# Patient Record
Sex: FEMALE | Race: WHITE | NOT HISPANIC OR LATINO | Employment: OTHER | RURAL
[De-identification: names, ages, dates, MRNs, and addresses within clinical notes are randomized per-mention and may not be internally consistent; named-entity substitution may affect disease eponyms.]

---

## 2020-03-11 ENCOUNTER — HISTORICAL (OUTPATIENT)
Dept: ADMINISTRATIVE | Facility: HOSPITAL | Age: 85
End: 2020-03-11

## 2020-03-11 LAB
INR BLD: 2 RATIO (ref 0–3.3)
PROTHROMBIN TIME: 24.2 SECONDS (ref 12–14.7)

## 2020-05-28 ENCOUNTER — HISTORICAL (OUTPATIENT)
Dept: ADMINISTRATIVE | Facility: HOSPITAL | Age: 85
End: 2020-05-28

## 2020-05-28 LAB
ALBUMIN SERPL BCP-MCNC: 3.4 G/DL (ref 3.5–5)
ALP SERPL-CCNC: 105 U/L (ref 55–142)
ALT SERPL W P-5'-P-CCNC: 14 U/L (ref 13–56)
AST SERPL W P-5'-P-CCNC: 13 U/L (ref 15–37)
BASOPHILS # BLD AUTO: 0.09 X10E3/UL (ref 0–0.2)
BASOPHILS NFR BLD AUTO: 1 % (ref 0–1)
BILIRUB DIRECT SERPL-MCNC: 0.2 MG/DL (ref 0–0.2)
BILIRUB SERPL-MCNC: 0.5 MG/DL (ref 0–1.2)
BUN SERPL-MCNC: 12 MG/DL (ref 7–18)
CALCIUM SERPL-MCNC: 8.7 MG/DL (ref 8.5–10.1)
CHLORIDE SERPL-SCNC: 108 MMOL/L (ref 98–107)
CHOLEST SERPL-MCNC: 146 MG/DL
CHOLEST/HDLC SERPL: 2.3 {RATIO}
CO2 SERPL-SCNC: 27 MMOL/L (ref 21–32)
CREAT SERPL-MCNC: 0.56 MG/DL (ref 0.5–1.02)
EOSINOPHIL # BLD AUTO: 0.25 X10E3/UL (ref 0–0.5)
EOSINOPHIL NFR BLD AUTO: 2.8 % (ref 1–4)
ERYTHROCYTE [DISTWIDTH] IN BLOOD BY AUTOMATED COUNT: 14.4 % (ref 11.5–14.5)
GLUCOSE SERPL-MCNC: 105 MG/DL (ref 74–106)
HCT VFR BLD AUTO: 40.5 % (ref 38–47)
HDLC SERPL-MCNC: 64 MG/DL
HGB BLD-MCNC: 12.4 G/DL (ref 12–16)
IMM GRANULOCYTES # BLD AUTO: 0.03 X10E3/UL (ref 0–0.04)
IMM GRANULOCYTES NFR BLD: 0.3 % (ref 0–0.4)
INR BLD: 2.6 RATIO (ref 0–3.3)
LDLC SERPL CALC-MCNC: 63 MG/DL
LYMPHOCYTES # BLD AUTO: 1.67 X10E3/UL (ref 1–4.8)
LYMPHOCYTES NFR BLD AUTO: 18.9 % (ref 27–41)
MCH RBC QN AUTO: 28.4 PG (ref 27–31)
MCHC RBC AUTO-ENTMCNC: 30.6 G/DL (ref 32–36)
MCV RBC AUTO: 92.7 FL (ref 80–96)
MONOCYTES # BLD AUTO: 1.04 X10E3/UL (ref 0–0.8)
MONOCYTES NFR BLD AUTO: 11.8 % (ref 2–6)
MPC BLD CALC-MCNC: 10.8 FL (ref 9.4–12.4)
NEUTROPHILS # BLD AUTO: 5.77 X10E3/UL (ref 1.8–7.7)
NEUTROPHILS NFR BLD AUTO: 65.2 % (ref 53–65)
NRBC # BLD AUTO: 0 X10E3/UL (ref 0–0)
NRBC, AUTO (.00): 0 /100 (ref 0–0)
PLATELET # BLD AUTO: 283 X10E3/UL (ref 150–400)
POTASSIUM SERPL-SCNC: 4 MMOL/L (ref 3.5–5.1)
PROT SERPL-MCNC: 6.9 G/DL (ref 6.4–8.2)
PROTHROMBIN TIME: 30.9 SECONDS (ref 12–14.7)
RBC # BLD AUTO: 4.37 X10E6/UL (ref 4.2–5.4)
SODIUM SERPL-SCNC: 140 MMOL/L (ref 136–145)
TRIGL SERPL-MCNC: 97 MG/DL
WBC # BLD AUTO: 8.85 X10E3/UL (ref 4.5–11)

## 2020-09-09 ENCOUNTER — HISTORICAL (OUTPATIENT)
Dept: ADMINISTRATIVE | Facility: HOSPITAL | Age: 85
End: 2020-09-09

## 2020-09-09 LAB
INR BLD: 2.6 RATIO (ref 0–3.3)
PROTHROMBIN TIME: 30.6 SECONDS (ref 12–14.7)

## 2020-11-17 ENCOUNTER — HISTORICAL (OUTPATIENT)
Dept: ADMINISTRATIVE | Facility: HOSPITAL | Age: 85
End: 2020-11-17

## 2020-11-17 LAB
INR BLD: 3.4 RATIO (ref 0–3.3)
PROTHROMBIN TIME: 40.6 SECONDS (ref 12–14.7)
SARS-COV+SARS-COV-2 AG RESP QL IA.RAPID: NEGATIVE

## 2020-11-19 ENCOUNTER — HISTORICAL (OUTPATIENT)
Dept: ADMINISTRATIVE | Facility: HOSPITAL | Age: 85
End: 2020-11-19

## 2020-11-19 LAB — SARS-COV+SARS-COV-2 AG RESP QL IA.RAPID: NEGATIVE

## 2021-01-28 ENCOUNTER — HISTORICAL (OUTPATIENT)
Dept: ADMINISTRATIVE | Facility: HOSPITAL | Age: 86
End: 2021-01-28

## 2021-01-28 LAB
INR BLD: 2.1 RATIO (ref 0–3.3)
PROTHROMBIN TIME: 25.1 SECONDS (ref 12–14.7)

## 2021-05-06 ENCOUNTER — HISTORICAL (OUTPATIENT)
Dept: ADMINISTRATIVE | Facility: HOSPITAL | Age: 86
End: 2021-05-06

## 2021-05-06 LAB
INR BLD: 2 RATIO (ref 0–3.3)
PROTHROMBIN TIME: 23.7 SECONDS (ref 12–14.7)

## 2021-12-09 ENCOUNTER — HOSPITAL ENCOUNTER (OUTPATIENT)
Facility: HOSPITAL | Age: 86
Discharge: HOME | End: 2021-12-12
Attending: INTERNAL MEDICINE | Admitting: INTERNAL MEDICINE
Payer: MEDICARE

## 2021-12-09 DIAGNOSIS — R06.02 SHORTNESS OF BREATH: ICD-10-CM

## 2021-12-09 DIAGNOSIS — J20.9 ACUTE BRONCHITIS, UNSPECIFIED ORGANISM: ICD-10-CM

## 2021-12-09 DIAGNOSIS — J44.1 ASTHMA WITH COPD WITH EXACERBATION: Primary | ICD-10-CM

## 2021-12-09 DIAGNOSIS — R06.02 SOB (SHORTNESS OF BREATH): ICD-10-CM

## 2021-12-09 DIAGNOSIS — J44.1 COPD EXACERBATION: ICD-10-CM

## 2021-12-09 DIAGNOSIS — J45.901 ASTHMA WITH COPD WITH EXACERBATION: Primary | ICD-10-CM

## 2021-12-09 LAB
ALBUMIN SERPL BCP-MCNC: 3.3 G/DL (ref 3.5–5)
ALBUMIN/GLOB SERPL: 1.1 {RATIO}
ALP SERPL-CCNC: 77 U/L (ref 55–142)
ALT SERPL W P-5'-P-CCNC: 12 U/L (ref 13–56)
ANION GAP SERPL CALCULATED.3IONS-SCNC: 12 MMOL/L (ref 7–16)
AST SERPL W P-5'-P-CCNC: 16 U/L (ref 15–37)
BASOPHILS # BLD AUTO: 0.03 K/UL (ref 0–0.2)
BASOPHILS NFR BLD AUTO: 0.3 % (ref 0–1)
BILIRUB SERPL-MCNC: 0.8 MG/DL (ref 0–1.2)
BUN SERPL-MCNC: 15 MG/DL (ref 7–18)
BUN/CREAT SERPL: 20 (ref 6–20)
CALCIUM SERPL-MCNC: 7.9 MG/DL (ref 8.5–10.1)
CHLORIDE SERPL-SCNC: 100 MMOL/L (ref 98–107)
CO2 SERPL-SCNC: 29 MMOL/L (ref 21–32)
CREAT SERPL-MCNC: 0.74 MG/DL (ref 0.55–1.02)
D DIMER PPP FEU-MCNC: 1.42 ΜG/ML (ref 0–0.47)
DIFFERENTIAL METHOD BLD: ABNORMAL
EOSINOPHIL # BLD AUTO: 0.03 K/UL (ref 0–0.5)
EOSINOPHIL NFR BLD AUTO: 0.3 % (ref 1–4)
ERYTHROCYTE [DISTWIDTH] IN BLOOD BY AUTOMATED COUNT: 14.3 % (ref 11.5–14.5)
FLUAV AG UPPER RESP QL IA.RAPID: NEGATIVE
FLUBV AG UPPER RESP QL IA.RAPID: NEGATIVE
GLOBULIN SER-MCNC: 3.1 G/DL (ref 2–4)
GLUCOSE SERPL-MCNC: 118 MG/DL (ref 74–106)
HCT VFR BLD AUTO: 38.4 % (ref 38–47)
HGB BLD-MCNC: 11.8 G/DL (ref 12–16)
IMM GRANULOCYTES # BLD AUTO: 0.02 K/UL (ref 0–0.04)
IMM GRANULOCYTES NFR BLD: 0.2 % (ref 0–0.4)
INR BLD: 1.32 (ref 0.9–1.1)
LACTATE SERPL-SCNC: 1.1 MMOL/L (ref 0.4–2)
LYMPHOCYTES # BLD AUTO: 0.92 K/UL (ref 1–4.8)
LYMPHOCYTES NFR BLD AUTO: 10.6 % (ref 27–41)
MCH RBC QN AUTO: 28.6 PG (ref 27–31)
MCHC RBC AUTO-ENTMCNC: 30.7 G/DL (ref 32–36)
MCV RBC AUTO: 93 FL (ref 80–96)
MONOCYTES # BLD AUTO: 1.33 K/UL (ref 0–0.8)
MONOCYTES NFR BLD AUTO: 15.3 % (ref 2–6)
MPC BLD CALC-MCNC: 11.1 FL (ref 9.4–12.4)
NEUTROPHILS # BLD AUTO: 6.39 K/UL (ref 1.8–7.7)
NEUTROPHILS NFR BLD AUTO: 73.3 % (ref 53–65)
NT-PROBNP SERPL-MCNC: 1232 PG/ML (ref 1–450)
PLATELET # BLD AUTO: 206 K/UL (ref 150–400)
POTASSIUM SERPL-SCNC: 3.6 MMOL/L (ref 3.5–5.1)
PROT SERPL-MCNC: 6.4 G/DL (ref 6.4–8.2)
PROTHROMBIN TIME: 16.3 SECONDS (ref 11.7–14.7)
RBC # BLD AUTO: 4.13 M/UL (ref 4.2–5.4)
SARS-COV+SARS-COV-2 AG RESP QL IA.RAPID: NEGATIVE
SODIUM SERPL-SCNC: 137 MMOL/L (ref 136–145)
TROPONIN I SERPL HS-MCNC: 18.6 PG/ML
WBC # BLD AUTO: 8.72 K/UL (ref 4.5–11)

## 2021-12-09 PROCEDURE — 85378 FIBRIN DEGRADE SEMIQUANT: CPT | Performed by: INTERNAL MEDICINE

## 2021-12-09 PROCEDURE — G0378 HOSPITAL OBSERVATION PER HR: HCPCS

## 2021-12-09 PROCEDURE — 25000242 PHARM REV CODE 250 ALT 637 W/ HCPCS: Performed by: INTERNAL MEDICINE

## 2021-12-09 PROCEDURE — 93010 ELECTROCARDIOGRAM REPORT: CPT | Performed by: INTERNAL MEDICINE

## 2021-12-09 PROCEDURE — 63600175 PHARM REV CODE 636 W HCPCS: Performed by: INTERNAL MEDICINE

## 2021-12-09 PROCEDURE — 94761 N-INVAS EAR/PLS OXIMETRY MLT: CPT

## 2021-12-09 PROCEDURE — 27000221 HC OXYGEN, UP TO 24 HOURS

## 2021-12-09 PROCEDURE — 83605 ASSAY OF LACTIC ACID: CPT | Performed by: INTERNAL MEDICINE

## 2021-12-09 PROCEDURE — 25500020 PHARM REV CODE 255: Performed by: INTERNAL MEDICINE

## 2021-12-09 PROCEDURE — 87040 BLOOD CULTURE FOR BACTERIA: CPT | Performed by: INTERNAL MEDICINE

## 2021-12-09 PROCEDURE — 99285 EMERGENCY DEPT VISIT HI MDM: CPT | Mod: 25

## 2021-12-09 PROCEDURE — 85610 PROTHROMBIN TIME: CPT | Performed by: INTERNAL MEDICINE

## 2021-12-09 PROCEDURE — 87428 SARSCOV & INF VIR A&B AG IA: CPT | Performed by: INTERNAL MEDICINE

## 2021-12-09 PROCEDURE — 93005 ELECTROCARDIOGRAM TRACING: CPT

## 2021-12-09 PROCEDURE — 94760 N-INVAS EAR/PLS OXIMETRY 1: CPT

## 2021-12-09 PROCEDURE — 25000003 PHARM REV CODE 250: Performed by: INTERNAL MEDICINE

## 2021-12-09 PROCEDURE — 83880 ASSAY OF NATRIURETIC PEPTIDE: CPT | Performed by: INTERNAL MEDICINE

## 2021-12-09 PROCEDURE — 94640 AIRWAY INHALATION TREATMENT: CPT

## 2021-12-09 PROCEDURE — 84484 ASSAY OF TROPONIN QUANT: CPT | Performed by: INTERNAL MEDICINE

## 2021-12-09 PROCEDURE — 80053 COMPREHEN METABOLIC PANEL: CPT | Performed by: INTERNAL MEDICINE

## 2021-12-09 PROCEDURE — 36415 COLL VENOUS BLD VENIPUNCTURE: CPT | Performed by: INTERNAL MEDICINE

## 2021-12-09 PROCEDURE — 85025 COMPLETE CBC W/AUTO DIFF WBC: CPT | Performed by: INTERNAL MEDICINE

## 2021-12-09 PROCEDURE — 96365 THER/PROPH/DIAG IV INF INIT: CPT

## 2021-12-09 PROCEDURE — 99284 EMERGENCY DEPT VISIT MOD MDM: CPT | Performed by: INTERNAL MEDICINE

## 2021-12-09 PROCEDURE — 96375 TX/PRO/DX INJ NEW DRUG ADDON: CPT

## 2021-12-09 PROCEDURE — 25000003 PHARM REV CODE 250

## 2021-12-09 PROCEDURE — 94640 AIRWAY INHALATION TREATMENT: CPT | Mod: XB

## 2021-12-09 RX ORDER — HYDROCHLOROTHIAZIDE 12.5 MG/1
12.5 CAPSULE ORAL DAILY
COMMUNITY
Start: 2021-11-08

## 2021-12-09 RX ORDER — IPRATROPIUM BROMIDE AND ALBUTEROL SULFATE 2.5; .5 MG/3ML; MG/3ML
3 SOLUTION RESPIRATORY (INHALATION) EVERY 6 HOURS
Status: DISCONTINUED | OUTPATIENT
Start: 2021-12-09 | End: 2021-12-12 | Stop reason: HOSPADM

## 2021-12-09 RX ORDER — SODIUM CHLORIDE 0.9 % (FLUSH) 0.9 %
10 SYRINGE (ML) INJECTION
Status: DISCONTINUED | OUTPATIENT
Start: 2021-12-09 | End: 2021-12-12 | Stop reason: HOSPADM

## 2021-12-09 RX ORDER — BUDESONIDE AND FORMOTEROL FUMARATE DIHYDRATE 160; 4.5 UG/1; UG/1
AEROSOL RESPIRATORY (INHALATION)
COMMUNITY
Start: 2021-12-07

## 2021-12-09 RX ORDER — METHYLPREDNISOLONE SOD SUCC 125 MG
125 VIAL (EA) INJECTION
Status: DISPENSED | OUTPATIENT
Start: 2021-12-09 | End: 2021-12-09

## 2021-12-09 RX ORDER — ATORVASTATIN CALCIUM 10 MG/1
10 TABLET, FILM COATED ORAL NIGHTLY
COMMUNITY
Start: 2021-11-08

## 2021-12-09 RX ORDER — IPRATROPIUM BROMIDE AND ALBUTEROL SULFATE 2.5; .5 MG/3ML; MG/3ML
3 SOLUTION RESPIRATORY (INHALATION)
Status: COMPLETED | OUTPATIENT
Start: 2021-12-09 | End: 2021-12-09

## 2021-12-09 RX ORDER — HYDROCHLOROTHIAZIDE 12.5 MG/1
12.5 TABLET ORAL DAILY
Status: DISCONTINUED | OUTPATIENT
Start: 2021-12-10 | End: 2021-12-12 | Stop reason: HOSPADM

## 2021-12-09 RX ORDER — PAROXETINE HYDROCHLORIDE 20 MG/1
20 TABLET, FILM COATED ORAL DAILY
COMMUNITY
Start: 2021-11-08

## 2021-12-09 RX ORDER — LIFITEGRAST 50 MG/ML
1 SOLUTION/ DROPS OPHTHALMIC 2 TIMES DAILY
COMMUNITY
Start: 2021-10-28

## 2021-12-09 RX ORDER — DOXYCYCLINE 100 MG/1
CAPSULE ORAL
COMMUNITY
Start: 2021-12-07 | End: 2022-10-28 | Stop reason: CLARIF

## 2021-12-09 RX ORDER — ONDANSETRON 2 MG/ML
4 INJECTION INTRAMUSCULAR; INTRAVENOUS EVERY 8 HOURS PRN
Status: DISCONTINUED | OUTPATIENT
Start: 2021-12-09 | End: 2021-12-12 | Stop reason: HOSPADM

## 2021-12-09 RX ORDER — HYDROCORTISONE 25 MG/G
CREAM TOPICAL
COMMUNITY
Start: 2021-12-07 | End: 2022-10-28 | Stop reason: CLARIF

## 2021-12-09 RX ORDER — WARFARIN SODIUM 5 MG/1
5 TABLET ORAL DAILY
Status: DISCONTINUED | OUTPATIENT
Start: 2021-12-10 | End: 2021-12-12 | Stop reason: HOSPADM

## 2021-12-09 RX ORDER — ATORVASTATIN CALCIUM 10 MG/1
10 TABLET, FILM COATED ORAL NIGHTLY
Status: DISCONTINUED | OUTPATIENT
Start: 2021-12-09 | End: 2021-12-12 | Stop reason: HOSPADM

## 2021-12-09 RX ORDER — WARFARIN SODIUM 5 MG/1
TABLET ORAL
Status: COMPLETED
Start: 2021-12-09 | End: 2021-12-09

## 2021-12-09 RX ORDER — MECLIZINE HYDROCHLORIDE 25 MG/1
25 TABLET ORAL 3 TIMES DAILY PRN
COMMUNITY
Start: 2021-12-07

## 2021-12-09 RX ORDER — METHYLPREDNISOLONE SOD SUCC 125 MG
80 VIAL (EA) INJECTION EVERY 8 HOURS
Status: DISCONTINUED | OUTPATIENT
Start: 2021-12-09 | End: 2021-12-12 | Stop reason: HOSPADM

## 2021-12-09 RX ORDER — WARFARIN SODIUM 5 MG/1
5 TABLET ORAL DAILY
COMMUNITY
Start: 2021-10-11

## 2021-12-09 RX ORDER — PAROXETINE 10 MG/1
10 TABLET, FILM COATED ORAL DAILY
Status: DISCONTINUED | OUTPATIENT
Start: 2021-12-10 | End: 2021-12-12 | Stop reason: HOSPADM

## 2021-12-09 RX ADMIN — ATORVASTATIN CALCIUM 10 MG: 10 TABLET, FILM COATED ORAL at 08:12

## 2021-12-09 RX ADMIN — IPRATROPIUM BROMIDE AND ALBUTEROL SULFATE 3 ML: .5; 3 SOLUTION RESPIRATORY (INHALATION) at 06:12

## 2021-12-09 RX ADMIN — IPRATROPIUM BROMIDE AND ALBUTEROL SULFATE 3 ML: .5; 3 SOLUTION RESPIRATORY (INHALATION) at 12:12

## 2021-12-09 RX ADMIN — IOPAMIDOL 85 ML: 755 INJECTION, SOLUTION INTRAVENOUS at 10:12

## 2021-12-09 RX ADMIN — WARFARIN SODIUM: 5 TABLET ORAL at 08:12

## 2021-12-09 RX ADMIN — CEFTRIAXONE SODIUM 1 G: 1 INJECTION, POWDER, FOR SOLUTION INTRAMUSCULAR; INTRAVENOUS at 12:12

## 2021-12-09 RX ADMIN — METHYLPREDNISOLONE SODIUM SUCCINATE 80 MG: 125 INJECTION, POWDER, FOR SOLUTION INTRAMUSCULAR; INTRAVENOUS at 09:12

## 2021-12-10 PROBLEM — J44.1 COPD EXACERBATION: Status: ACTIVE | Noted: 2021-12-10

## 2021-12-10 LAB
BASOPHILS # BLD AUTO: 0.01 K/UL (ref 0–0.2)
BASOPHILS NFR BLD AUTO: 0.2 % (ref 0–1)
DIFFERENTIAL METHOD BLD: ABNORMAL
EOSINOPHIL # BLD AUTO: 0 K/UL (ref 0–0.5)
EOSINOPHIL NFR BLD AUTO: 0 % (ref 1–4)
ERYTHROCYTE [DISTWIDTH] IN BLOOD BY AUTOMATED COUNT: 14.3 % (ref 11.5–14.5)
HCT VFR BLD AUTO: 39.7 % (ref 38–47)
HGB BLD-MCNC: 12.5 G/DL (ref 12–16)
IMM GRANULOCYTES # BLD AUTO: 0.01 K/UL (ref 0–0.04)
IMM GRANULOCYTES NFR BLD: 0.2 % (ref 0–0.4)
LYMPHOCYTES # BLD AUTO: 0.87 K/UL (ref 1–4.8)
LYMPHOCYTES NFR BLD AUTO: 14.8 % (ref 27–41)
MCH RBC QN AUTO: 29.4 PG (ref 27–31)
MCHC RBC AUTO-ENTMCNC: 31.5 G/DL (ref 32–36)
MCV RBC AUTO: 93.4 FL (ref 80–96)
MONOCYTES # BLD AUTO: 0.18 K/UL (ref 0–0.8)
MONOCYTES NFR BLD AUTO: 3.1 % (ref 2–6)
MPC BLD CALC-MCNC: 10.5 FL (ref 9.4–12.4)
NEUTROPHILS # BLD AUTO: 4.82 K/UL (ref 1.8–7.7)
NEUTROPHILS NFR BLD AUTO: 81.7 % (ref 53–65)
PLATELET # BLD AUTO: 198 K/UL (ref 150–400)
RBC # BLD AUTO: 4.25 M/UL (ref 4.2–5.4)
WBC # BLD AUTO: 5.89 K/UL (ref 4.5–11)

## 2021-12-10 PROCEDURE — 99225 PR SUBSEQUENT OBSERVATION CARE,LEVEL II: ICD-10-PCS | Mod: ,,, | Performed by: FAMILY MEDICINE

## 2021-12-10 PROCEDURE — G0378 HOSPITAL OBSERVATION PER HR: HCPCS

## 2021-12-10 PROCEDURE — 85025 COMPLETE CBC W/AUTO DIFF WBC: CPT | Performed by: INTERNAL MEDICINE

## 2021-12-10 PROCEDURE — 96366 THER/PROPH/DIAG IV INF ADDON: CPT

## 2021-12-10 PROCEDURE — 25000003 PHARM REV CODE 250: Performed by: INTERNAL MEDICINE

## 2021-12-10 PROCEDURE — 94640 AIRWAY INHALATION TREATMENT: CPT | Mod: XB

## 2021-12-10 PROCEDURE — 27000221 HC OXYGEN, UP TO 24 HOURS

## 2021-12-10 PROCEDURE — 96376 TX/PRO/DX INJ SAME DRUG ADON: CPT

## 2021-12-10 PROCEDURE — 63600175 PHARM REV CODE 636 W HCPCS: Performed by: INTERNAL MEDICINE

## 2021-12-10 PROCEDURE — 99225 PR SUBSEQUENT OBSERVATION CARE,LEVEL II: CPT | Mod: ,,, | Performed by: FAMILY MEDICINE

## 2021-12-10 PROCEDURE — 36415 COLL VENOUS BLD VENIPUNCTURE: CPT | Performed by: INTERNAL MEDICINE

## 2021-12-10 PROCEDURE — 99900035 HC TECH TIME PER 15 MIN (STAT)

## 2021-12-10 PROCEDURE — 25000242 PHARM REV CODE 250 ALT 637 W/ HCPCS: Performed by: INTERNAL MEDICINE

## 2021-12-10 PROCEDURE — 94761 N-INVAS EAR/PLS OXIMETRY MLT: CPT

## 2021-12-10 RX ADMIN — IPRATROPIUM BROMIDE AND ALBUTEROL SULFATE 3 ML: .5; 3 SOLUTION RESPIRATORY (INHALATION) at 06:12

## 2021-12-10 RX ADMIN — METHYLPREDNISOLONE SODIUM SUCCINATE 80 MG: 125 INJECTION, POWDER, FOR SOLUTION INTRAMUSCULAR; INTRAVENOUS at 01:12

## 2021-12-10 RX ADMIN — ATORVASTATIN CALCIUM 10 MG: 10 TABLET, FILM COATED ORAL at 08:12

## 2021-12-10 RX ADMIN — METHYLPREDNISOLONE SODIUM SUCCINATE 80 MG: 125 INJECTION, POWDER, FOR SOLUTION INTRAMUSCULAR; INTRAVENOUS at 05:12

## 2021-12-10 RX ADMIN — HYDROCHLOROTHIAZIDE 12.5 MG: 12.5 TABLET ORAL at 08:12

## 2021-12-10 RX ADMIN — IPRATROPIUM BROMIDE AND ALBUTEROL SULFATE 3 ML: .5; 3 SOLUTION RESPIRATORY (INHALATION) at 12:12

## 2021-12-10 RX ADMIN — CEFTRIAXONE SODIUM 1 G: 1 INJECTION, POWDER, FOR SOLUTION INTRAMUSCULAR; INTRAVENOUS at 01:12

## 2021-12-10 RX ADMIN — METHYLPREDNISOLONE SODIUM SUCCINATE 80 MG: 125 INJECTION, POWDER, FOR SOLUTION INTRAMUSCULAR; INTRAVENOUS at 09:12

## 2021-12-10 RX ADMIN — WARFARIN SODIUM 5 MG: 5 TABLET ORAL at 05:12

## 2021-12-10 RX ADMIN — PAROXETINE HYDROCHLORIDE 10 MG: 10 TABLET, FILM COATED ORAL at 08:12

## 2021-12-11 LAB
BASOPHILS # BLD AUTO: 0.01 K/UL (ref 0–0.2)
BASOPHILS NFR BLD AUTO: 0.1 % (ref 0–1)
DIFFERENTIAL METHOD BLD: ABNORMAL
EOSINOPHIL # BLD AUTO: 0 K/UL (ref 0–0.5)
EOSINOPHIL NFR BLD AUTO: 0 % (ref 1–4)
ERYTHROCYTE [DISTWIDTH] IN BLOOD BY AUTOMATED COUNT: 14.3 % (ref 11.5–14.5)
HCT VFR BLD AUTO: 37.1 % (ref 38–47)
HGB BLD-MCNC: 11.6 G/DL (ref 12–16)
IMM GRANULOCYTES # BLD AUTO: 0.01 K/UL (ref 0–0.04)
IMM GRANULOCYTES NFR BLD: 0.1 % (ref 0–0.4)
LYMPHOCYTES # BLD AUTO: 0.72 K/UL (ref 1–4.8)
LYMPHOCYTES NFR BLD AUTO: 7.9 % (ref 27–41)
MCH RBC QN AUTO: 29.1 PG (ref 27–31)
MCHC RBC AUTO-ENTMCNC: 31.3 G/DL (ref 32–36)
MCV RBC AUTO: 93 FL (ref 80–96)
MONOCYTES # BLD AUTO: 0.52 K/UL (ref 0–0.8)
MONOCYTES NFR BLD AUTO: 5.7 % (ref 2–6)
MPC BLD CALC-MCNC: 10.8 FL (ref 9.4–12.4)
NEUTROPHILS # BLD AUTO: 7.83 K/UL (ref 1.8–7.7)
NEUTROPHILS NFR BLD AUTO: 86.2 % (ref 53–65)
PLATELET # BLD AUTO: 192 K/UL (ref 150–400)
RBC # BLD AUTO: 3.99 M/UL (ref 4.2–5.4)
WBC # BLD AUTO: 9.09 K/UL (ref 4.5–11)

## 2021-12-11 PROCEDURE — 96366 THER/PROPH/DIAG IV INF ADDON: CPT

## 2021-12-11 PROCEDURE — 99226 PR SUBSEQUENT OBSERVATION CARE,LEVEL III: CPT | Performed by: SPECIALIST

## 2021-12-11 PROCEDURE — 63600175 PHARM REV CODE 636 W HCPCS: Performed by: INTERNAL MEDICINE

## 2021-12-11 PROCEDURE — G0378 HOSPITAL OBSERVATION PER HR: HCPCS

## 2021-12-11 PROCEDURE — 85025 COMPLETE CBC W/AUTO DIFF WBC: CPT | Performed by: INTERNAL MEDICINE

## 2021-12-11 PROCEDURE — 96376 TX/PRO/DX INJ SAME DRUG ADON: CPT

## 2021-12-11 PROCEDURE — 25000242 PHARM REV CODE 250 ALT 637 W/ HCPCS: Performed by: INTERNAL MEDICINE

## 2021-12-11 PROCEDURE — 94761 N-INVAS EAR/PLS OXIMETRY MLT: CPT

## 2021-12-11 PROCEDURE — 94640 AIRWAY INHALATION TREATMENT: CPT

## 2021-12-11 PROCEDURE — 36415 COLL VENOUS BLD VENIPUNCTURE: CPT | Performed by: INTERNAL MEDICINE

## 2021-12-11 PROCEDURE — 27000221 HC OXYGEN, UP TO 24 HOURS

## 2021-12-11 PROCEDURE — 96375 TX/PRO/DX INJ NEW DRUG ADDON: CPT

## 2021-12-11 PROCEDURE — 94640 AIRWAY INHALATION TREATMENT: CPT | Mod: XB

## 2021-12-11 PROCEDURE — 25000003 PHARM REV CODE 250: Performed by: INTERNAL MEDICINE

## 2021-12-11 RX ADMIN — HYDROCHLOROTHIAZIDE 12.5 MG: 12.5 TABLET ORAL at 08:12

## 2021-12-11 RX ADMIN — IPRATROPIUM BROMIDE AND ALBUTEROL SULFATE 3 ML: .5; 3 SOLUTION RESPIRATORY (INHALATION) at 01:12

## 2021-12-11 RX ADMIN — IPRATROPIUM BROMIDE AND ALBUTEROL SULFATE 3 ML: .5; 3 SOLUTION RESPIRATORY (INHALATION) at 12:12

## 2021-12-11 RX ADMIN — PAROXETINE HYDROCHLORIDE 10 MG: 10 TABLET, FILM COATED ORAL at 08:12

## 2021-12-11 RX ADMIN — IPRATROPIUM BROMIDE AND ALBUTEROL SULFATE 3 ML: .5; 3 SOLUTION RESPIRATORY (INHALATION) at 06:12

## 2021-12-11 RX ADMIN — CEFTRIAXONE SODIUM 1 G: 1 INJECTION, POWDER, FOR SOLUTION INTRAMUSCULAR; INTRAVENOUS at 12:12

## 2021-12-11 RX ADMIN — WARFARIN SODIUM 5 MG: 5 TABLET ORAL at 05:12

## 2021-12-11 RX ADMIN — ONDANSETRON 4 MG: 2 INJECTION INTRAMUSCULAR; INTRAVENOUS at 01:12

## 2021-12-11 RX ADMIN — ATORVASTATIN CALCIUM 10 MG: 10 TABLET, FILM COATED ORAL at 08:12

## 2021-12-11 RX ADMIN — METHYLPREDNISOLONE SODIUM SUCCINATE 80 MG: 125 INJECTION, POWDER, FOR SOLUTION INTRAMUSCULAR; INTRAVENOUS at 09:12

## 2021-12-11 RX ADMIN — METHYLPREDNISOLONE SODIUM SUCCINATE 80 MG: 125 INJECTION, POWDER, FOR SOLUTION INTRAMUSCULAR; INTRAVENOUS at 05:12

## 2021-12-11 RX ADMIN — METHYLPREDNISOLONE SODIUM SUCCINATE 80 MG: 125 INJECTION, POWDER, FOR SOLUTION INTRAMUSCULAR; INTRAVENOUS at 01:12

## 2021-12-12 VITALS
BODY MASS INDEX: 28.4 KG/M2 | WEIGHT: 187.38 LBS | DIASTOLIC BLOOD PRESSURE: 76 MMHG | RESPIRATION RATE: 20 BRPM | HEIGHT: 68 IN | HEART RATE: 67 BPM | TEMPERATURE: 99 F | SYSTOLIC BLOOD PRESSURE: 130 MMHG | OXYGEN SATURATION: 97 %

## 2021-12-12 LAB
BASOPHILS # BLD AUTO: 0.01 K/UL (ref 0–0.2)
BASOPHILS NFR BLD AUTO: 0.1 % (ref 0–1)
DIFFERENTIAL METHOD BLD: ABNORMAL
EOSINOPHIL # BLD AUTO: 0 K/UL (ref 0–0.5)
EOSINOPHIL NFR BLD AUTO: 0 % (ref 1–4)
ERYTHROCYTE [DISTWIDTH] IN BLOOD BY AUTOMATED COUNT: 14.4 % (ref 11.5–14.5)
HCT VFR BLD AUTO: 36.2 % (ref 38–47)
HGB BLD-MCNC: 11.3 G/DL (ref 12–16)
IMM GRANULOCYTES # BLD AUTO: 0.02 K/UL (ref 0–0.04)
IMM GRANULOCYTES NFR BLD: 0.3 % (ref 0–0.4)
LYMPHOCYTES # BLD AUTO: 0.64 K/UL (ref 1–4.8)
LYMPHOCYTES NFR BLD AUTO: 8.4 % (ref 27–41)
MCH RBC QN AUTO: 29.4 PG (ref 27–31)
MCHC RBC AUTO-ENTMCNC: 31.2 G/DL (ref 32–36)
MCV RBC AUTO: 94 FL (ref 80–96)
MONOCYTES # BLD AUTO: 0.42 K/UL (ref 0–0.8)
MONOCYTES NFR BLD AUTO: 5.5 % (ref 2–6)
MPC BLD CALC-MCNC: 10.9 FL (ref 9.4–12.4)
NEUTROPHILS # BLD AUTO: 6.49 K/UL (ref 1.8–7.7)
NEUTROPHILS NFR BLD AUTO: 85.7 % (ref 53–65)
PLATELET # BLD AUTO: 199 K/UL (ref 150–400)
RBC # BLD AUTO: 3.85 M/UL (ref 4.2–5.4)
WBC # BLD AUTO: 7.58 K/UL (ref 4.5–11)

## 2021-12-12 PROCEDURE — G0378 HOSPITAL OBSERVATION PER HR: HCPCS

## 2021-12-12 PROCEDURE — 94640 AIRWAY INHALATION TREATMENT: CPT

## 2021-12-12 PROCEDURE — 85025 COMPLETE CBC W/AUTO DIFF WBC: CPT | Performed by: INTERNAL MEDICINE

## 2021-12-12 PROCEDURE — 27000221 HC OXYGEN, UP TO 24 HOURS

## 2021-12-12 PROCEDURE — 25000242 PHARM REV CODE 250 ALT 637 W/ HCPCS: Performed by: INTERNAL MEDICINE

## 2021-12-12 PROCEDURE — 36415 COLL VENOUS BLD VENIPUNCTURE: CPT | Performed by: INTERNAL MEDICINE

## 2021-12-12 PROCEDURE — 63600175 PHARM REV CODE 636 W HCPCS: Performed by: INTERNAL MEDICINE

## 2021-12-12 PROCEDURE — 96376 TX/PRO/DX INJ SAME DRUG ADON: CPT

## 2021-12-12 PROCEDURE — 94761 N-INVAS EAR/PLS OXIMETRY MLT: CPT

## 2021-12-12 PROCEDURE — 25000003 PHARM REV CODE 250: Performed by: INTERNAL MEDICINE

## 2021-12-12 PROCEDURE — 94640 AIRWAY INHALATION TREATMENT: CPT | Mod: XB

## 2021-12-12 RX ADMIN — IPRATROPIUM BROMIDE AND ALBUTEROL SULFATE 3 ML: .5; 3 SOLUTION RESPIRATORY (INHALATION) at 01:12

## 2021-12-12 RX ADMIN — METHYLPREDNISOLONE SODIUM SUCCINATE 80 MG: 125 INJECTION, POWDER, FOR SOLUTION INTRAMUSCULAR; INTRAVENOUS at 05:12

## 2021-12-12 RX ADMIN — IPRATROPIUM BROMIDE AND ALBUTEROL SULFATE 3 ML: .5; 3 SOLUTION RESPIRATORY (INHALATION) at 12:12

## 2021-12-12 RX ADMIN — PAROXETINE HYDROCHLORIDE 10 MG: 10 TABLET, FILM COATED ORAL at 08:12

## 2021-12-12 RX ADMIN — IPRATROPIUM BROMIDE AND ALBUTEROL SULFATE 3 ML: .5; 3 SOLUTION RESPIRATORY (INHALATION) at 07:12

## 2021-12-12 RX ADMIN — HYDROCHLOROTHIAZIDE 12.5 MG: 12.5 TABLET ORAL at 08:12

## 2021-12-12 RX ADMIN — ONDANSETRON 4 MG: 2 INJECTION INTRAMUSCULAR; INTRAVENOUS at 10:12

## 2021-12-15 LAB
BACTERIA BLD CULT: NORMAL
BACTERIA BLD CULT: NORMAL

## 2022-01-18 ENCOUNTER — INFUSION (OUTPATIENT)
Dept: INFECTIOUS DISEASES | Facility: HOSPITAL | Age: 87
End: 2022-01-18
Attending: FAMILY MEDICINE
Payer: MEDICARE

## 2022-01-18 VITALS
DIASTOLIC BLOOD PRESSURE: 69 MMHG | SYSTOLIC BLOOD PRESSURE: 122 MMHG | HEART RATE: 73 BPM | TEMPERATURE: 99 F | OXYGEN SATURATION: 92 %

## 2022-01-18 PROCEDURE — 63600175 PHARM REV CODE 636 W HCPCS: Performed by: FAMILY MEDICINE

## 2022-01-18 PROCEDURE — 25000003 PHARM REV CODE 250: Performed by: FAMILY MEDICINE

## 2022-01-18 PROCEDURE — M0247 HC IV INFUSION, SOTROVIMAB, INCL POST ADMIN MONIT: HCPCS | Performed by: FAMILY MEDICINE

## 2022-01-18 RX ORDER — ACETAMINOPHEN 325 MG/1
650 TABLET ORAL ONCE AS NEEDED
Status: DISCONTINUED | OUTPATIENT
Start: 2022-01-18 | End: 2022-10-28 | Stop reason: CLARIF

## 2022-01-18 RX ORDER — EPINEPHRINE 0.3 MG/.3ML
0.3 INJECTION SUBCUTANEOUS
Status: DISCONTINUED | OUTPATIENT
Start: 2022-01-18 | End: 2022-10-28 | Stop reason: CLARIF

## 2022-01-18 RX ORDER — ONDANSETRON 4 MG/1
4 TABLET, ORALLY DISINTEGRATING ORAL ONCE AS NEEDED
Status: DISCONTINUED | OUTPATIENT
Start: 2022-01-18 | End: 2022-10-28 | Stop reason: CLARIF

## 2022-01-18 RX ORDER — SODIUM CHLORIDE 0.9 % (FLUSH) 0.9 %
10 SYRINGE (ML) INJECTION
Status: DISCONTINUED | OUTPATIENT
Start: 2022-01-18 | End: 2022-10-28 | Stop reason: CLARIF

## 2022-01-18 RX ORDER — DIPHENHYDRAMINE HYDROCHLORIDE 50 MG/ML
25 INJECTION INTRAMUSCULAR; INTRAVENOUS ONCE AS NEEDED
Status: DISCONTINUED | OUTPATIENT
Start: 2022-01-18 | End: 2022-10-28 | Stop reason: CLARIF

## 2022-01-18 RX ORDER — ALBUTEROL SULFATE 90 UG/1
2 AEROSOL, METERED RESPIRATORY (INHALATION)
Status: DISCONTINUED | OUTPATIENT
Start: 2022-01-18 | End: 2022-10-28 | Stop reason: CLARIF

## 2022-01-18 RX ADMIN — SODIUM CHLORIDE 500 MG: 9 INJECTION, SOLUTION INTRAVENOUS at 02:01

## 2022-01-18 NOTE — PROGRESS NOTES
1630 Discharge instructions given. Left through the ER exit with a walker.Left with a family member.

## 2022-10-27 ENCOUNTER — HOSPITAL ENCOUNTER (EMERGENCY)
Facility: HOSPITAL | Age: 87
Discharge: ANOTHER HOSPITAL | End: 2022-10-28
Attending: FAMILY MEDICINE
Payer: MEDICARE

## 2022-10-27 DIAGNOSIS — S42.292A OTHER CLOSED DISPLACED FRACTURE OF PROXIMAL END OF LEFT HUMERUS, INITIAL ENCOUNTER: Primary | ICD-10-CM
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
## 2022-10-27 PROBLEM — S42.202A CLOSED FRACTURE OF LEFT PROXIMAL HUMERUS: Status: ACTIVE | Noted: 2022-10-27

## 2022-10-27 LAB
ALBUMIN SERPL BCP-MCNC: 3.6 G/DL (ref 3.5–5)
ALBUMIN/GLOB SERPL: 1.3 {RATIO}
ALP SERPL-CCNC: 104 U/L (ref 55–142)
ALT SERPL W P-5'-P-CCNC: 19 U/L (ref 13–56)
ANION GAP SERPL CALCULATED.3IONS-SCNC: 12 MMOL/L (ref 7–16)
APTT PPP: 32.1 SECONDS (ref 25.2–37.3)
AST SERPL W P-5'-P-CCNC: 17 U/L (ref 15–37)
BASOPHILS # BLD AUTO: 0.07 K/UL (ref 0–0.2)
BASOPHILS NFR BLD AUTO: 0.6 % (ref 0–1)
BILIRUB SERPL-MCNC: 0.5 MG/DL (ref ?–1.2)
BUN SERPL-MCNC: 12 MG/DL (ref 7–18)
BUN/CREAT SERPL: 21 (ref 6–20)
CALCIUM SERPL-MCNC: 9.3 MG/DL (ref 8.5–10.1)
CHLORIDE SERPL-SCNC: 104 MMOL/L (ref 98–107)
CO2 SERPL-SCNC: 29 MMOL/L (ref 21–32)
CREAT SERPL-MCNC: 0.56 MG/DL (ref 0.55–1.02)
DIFFERENTIAL METHOD BLD: ABNORMAL
EGFR (NO RACE VARIABLE) (RUSH/TITUS): 86 ML/MIN/1.73M²
EOSINOPHIL # BLD AUTO: 0.16 K/UL (ref 0–0.5)
EOSINOPHIL NFR BLD AUTO: 1.3 % (ref 1–4)
ERYTHROCYTE [DISTWIDTH] IN BLOOD BY AUTOMATED COUNT: 14 % (ref 11.5–14.5)
GLOBULIN SER-MCNC: 2.7 G/DL (ref 2–4)
GLUCOSE SERPL-MCNC: 119 MG/DL (ref 74–106)
HCT VFR BLD AUTO: 40.3 % (ref 38–47)
HGB BLD-MCNC: 12.4 G/DL (ref 12–16)
IMM GRANULOCYTES # BLD AUTO: 0.05 K/UL (ref 0–0.04)
IMM GRANULOCYTES NFR BLD: 0.4 % (ref 0–0.4)
INR BLD: 1.82
LYMPHOCYTES # BLD AUTO: 1.52 K/UL (ref 1–4.8)
LYMPHOCYTES NFR BLD AUTO: 12.2 % (ref 27–41)
MCH RBC QN AUTO: 28.7 PG (ref 27–31)
MCHC RBC AUTO-ENTMCNC: 30.8 G/DL (ref 32–36)
MCV RBC AUTO: 93.3 FL (ref 80–96)
MONOCYTES # BLD AUTO: 1.14 K/UL (ref 0–0.8)
MONOCYTES NFR BLD AUTO: 9.1 % (ref 2–6)
MPC BLD CALC-MCNC: 9.5 FL (ref 9.4–12.4)
NEUTROPHILS # BLD AUTO: 9.54 K/UL (ref 1.8–7.7)
NEUTROPHILS NFR BLD AUTO: 76.4 % (ref 53–65)
NRBC # BLD AUTO: 0 X10E3/UL
NRBC, AUTO (.00): 0 %
PLATELET # BLD AUTO: 260 K/UL (ref 150–400)
POTASSIUM SERPL-SCNC: 4.7 MMOL/L (ref 3.5–5.1)
PROT SERPL-MCNC: 6.3 G/DL (ref 6.4–8.2)
PROTHROMBIN TIME: 20.3 SECONDS (ref 11.7–14.7)
RBC # BLD AUTO: 4.32 M/UL (ref 4.2–5.4)
SODIUM SERPL-SCNC: 140 MMOL/L (ref 136–145)
WBC # BLD AUTO: 12.48 K/UL (ref 4.5–11)

## 2022-10-27 PROCEDURE — 85730 THROMBOPLASTIN TIME PARTIAL: CPT | Performed by: EMERGENCY MEDICINE

## 2022-10-27 PROCEDURE — 96376 TX/PRO/DX INJ SAME DRUG ADON: CPT

## 2022-10-27 PROCEDURE — 36415 COLL VENOUS BLD VENIPUNCTURE: CPT | Performed by: EMERGENCY MEDICINE

## 2022-10-27 PROCEDURE — 99285 EMERGENCY DEPT VISIT HI MDM: CPT | Mod: 25

## 2022-10-27 PROCEDURE — 99285 EMERGENCY DEPT VISIT HI MDM: CPT | Mod: ,,, | Performed by: FAMILY MEDICINE

## 2022-10-27 PROCEDURE — 99285 PR EMERGENCY DEPT VISIT,LEVEL V: ICD-10-PCS | Mod: ,,, | Performed by: FAMILY MEDICINE

## 2022-10-27 PROCEDURE — 85610 PROTHROMBIN TIME: CPT | Performed by: EMERGENCY MEDICINE

## 2022-10-27 PROCEDURE — 85025 COMPLETE CBC W/AUTO DIFF WBC: CPT | Performed by: FAMILY MEDICINE

## 2022-10-27 PROCEDURE — 96374 THER/PROPH/DIAG INJ IV PUSH: CPT

## 2022-10-27 PROCEDURE — 63600175 PHARM REV CODE 636 W HCPCS: Performed by: FAMILY MEDICINE

## 2022-10-27 PROCEDURE — 80053 COMPREHEN METABOLIC PANEL: CPT | Performed by: FAMILY MEDICINE

## 2022-10-27 PROCEDURE — 96375 TX/PRO/DX INJ NEW DRUG ADDON: CPT

## 2022-10-27 RX ORDER — MORPHINE SULFATE 4 MG/ML
4 INJECTION, SOLUTION INTRAMUSCULAR; INTRAVENOUS
Status: COMPLETED | OUTPATIENT
Start: 2022-10-28 | End: 2022-10-28

## 2022-10-27 RX ORDER — MORPHINE SULFATE 4 MG/ML
4 INJECTION, SOLUTION INTRAMUSCULAR; INTRAVENOUS
Status: COMPLETED | OUTPATIENT
Start: 2022-10-27 | End: 2022-10-27

## 2022-10-27 RX ORDER — ONDANSETRON 2 MG/ML
4 INJECTION INTRAMUSCULAR; INTRAVENOUS
Status: COMPLETED | OUTPATIENT
Start: 2022-10-28 | End: 2022-10-28

## 2022-10-27 RX ADMIN — MORPHINE SULFATE 4 MG: 4 INJECTION, SOLUTION INTRAMUSCULAR; INTRAVENOUS at 04:10

## 2022-10-27 NOTE — ED PROVIDER NOTES
Encounter Date: 10/27/2022    SCRIBE #1 NOTE: I, Cheyanne Saucedo, am scribing for, and in the presence of,  Nora Ruth MD. I have scribed the entire note.     History     Chief Complaint   Patient presents with    Fall     89 y.o. female presents to the emergency department with complaints of shoulder pain. Patient explained she has a hx of vertigo. The patient stepped up approximately 18 inches and lost her balance secondary to having a wave of vertigo. Patient stated when she fell, she landed on her left shoulder. Patient stated she has a PSHx of left shoulder surgery and denies hitting her head or loss of consciousness. No other symptoms were reported.     The history is provided by the patient.   Review of patient's allergies indicates:  No Known Allergies  Past Medical History:   Diagnosis Date    Asthma     COPD (chronic obstructive pulmonary disease)     Hx of blood clots     Hypercholesterolemia     Hypertension      Past Surgical History:   Procedure Laterality Date    APPENDECTOMY      cataract surgery       CHOLECYSTECTOMY      HYSTERECTOMY      JOINT REPLACEMENT      left knee    SHOULDER SURGERY Bilateral      History reviewed. No pertinent family history.  Social History     Tobacco Use    Smoking status: Never    Smokeless tobacco: Never   Substance Use Topics    Alcohol use: Not Currently    Drug use: Not Currently     Review of Systems   Constitutional: Negative.  Negative for fever.   HENT: Negative.     Eyes: Negative.    Respiratory: Negative.     Cardiovascular: Negative.    Gastrointestinal: Negative.    Endocrine: Negative.    Genitourinary: Negative.    Musculoskeletal:         Shoulder pain   Skin: Negative.    Allergic/Immunologic: Negative.    Neurological:  Dizziness: vertigo.   Hematological: Negative.    Psychiatric/Behavioral: Negative.     All other systems reviewed and are negative.    Physical Exam     Initial Vitals   BP Pulse Resp Temp SpO2   10/27/22 1639 10/27/22 1633  10/27/22 1633 10/27/22 1633 10/27/22 1633   (!) 150/81 94 18 98.8 °F (37.1 °C) 96 %      MAP       --                Physical Exam    Vitals reviewed.  Constitutional: She appears well-developed and well-nourished. No distress.   HENT:   Head: Normocephalic.   Eyes: Conjunctivae are normal. Pupils are equal, round, and reactive to light.   Cardiovascular:  Normal rate, regular rhythm, normal heart sounds and intact distal pulses.           Pulmonary/Chest: Breath sounds normal.   Abdominal: Abdomen is soft. Bowel sounds are normal.   Musculoskeletal:         General: Tenderness present.      Left shoulder: Tenderness present. Decreased range of motion.      Comments: Extreme pain to palpation over shoulder joint.   Patient will not allow any rang of motion.   Neurovascular lead intact.      Neurological: She is alert and oriented to person, place, and time.   Skin: Skin is warm and dry.   Psychiatric: She has a normal mood and affect.       ED Course   Procedures  Labs Reviewed   COMPREHENSIVE METABOLIC PANEL - Abnormal; Notable for the following components:       Result Value    Glucose 119 (*)     BUN/Creatinine Ratio 21 (*)     Total Protein 6.3 (*)     All other components within normal limits   CBC WITH DIFFERENTIAL - Abnormal; Notable for the following components:    WBC 12.48 (*)     MCHC 30.8 (*)     Neutrophils % 76.4 (*)     Lymphocytes % 12.2 (*)     Monocytes % 9.1 (*)     Neutrophils, Abs 9.54 (*)     Monocytes, Absolute 1.14 (*)     Immature Granulocytes, Absolute 0.05 (*)     All other components within normal limits   PROTIME-INR - Abnormal; Notable for the following components:    PT 20.3 (*)     All other components within normal limits   APTT - Normal   CBC W/ AUTO DIFFERENTIAL    Narrative:     The following orders were created for panel order CBC auto differential.  Procedure                               Abnormality         Status                     ---------                                -----------         ------                     CBC with Differential[[id number]]        Abnormal            Final result                 Please view results for these tests on the individual orders.          Imaging Results              CT Cervical Spine Without Contrast (Final result)  Result time 10/28/22 07:31:12      Final result by Jose Armando Baxter MD (10/28/22 07:31:12)                   Impression:      No acute fracture or dislocation of the cervical spine.      Electronically signed by: Jose Armando Baxter  Date:    10/28/2022  Time:    07:31               Narrative:    EXAMINATION:  CT CERVICAL SPINE WITHOUT CONTRAST    CLINICAL HISTORY:  FALL;    TECHNIQUE:  CT of the cervical spine performed without intravenous contrast.    COMPARISON:  None    FINDINGS:  No fracture detected.  No dislocation.  Craniocervical junction is intact.  Degenerative endplate and facet changes are seen throughout the cervical spine.  There is minimal degree of anterolisthesis of C2 on C3.  Osteophytes with mild spinal canal and foraminal narrowing bilaterally throughout.                                       CT Head Without Contrast (Final result)  Result time 10/27/22 19:54:14      Final result by Bang Vicente DO (10/27/22 19:54:14)                   Impression:      No convincing evidence of acute intracranial hemorrhage.  Probable chronic microvascular ischemic change and volume loss.    The CT exam was performed using one or more of the following dose    reduction techniques- Automated exposure control, adjustment of the mA    and/or kV according to patient size, and/or use of iterative    reconstructed technique.    Point of Service: [facility]      Electronically signed by: Bang Vicente  Date:    10/27/2022  Time:    19:54               Narrative:    EXAMINATION:  CT HEAD WITHOUT CONTRAST    CLINICAL HISTORY:  Head trauma, moderate-severe;    COMPARISON:  CT brain December 16, 2013    TECHNIQUE:  Multiple axial  tomographic images of the brain were obtained without the use of intravenous contrast.    FINDINGS:  Midline structures are nondisplaced.  No convincing evidence of acute intracranial hemorrhage.  No convincing evidence of hydrocephalus.  Moderate global volume loss demonstrated.  Mild periventricular and subcortical hypoattenuation noted which is nonspecific but consistent with chronic microvascular ischemic change. Less likely considerations include demyelinating process and vasculitis. Visualized paranasal sinuses and mastoid air cells are predominantly clear.                                       X-Ray Shoulder 2 or More Views Left (Final result)  Result time 10/27/22 17:19:31      Final result by Bang Vicente DO (10/27/22 17:19:31)                   Impression:      Acute proximal humeral fracture.    Point of Service: [facility]      Electronically signed by: Bang Vicente  Date:    10/27/2022  Time:    17:19               Narrative:    EXAMINATION:  XR SHOULDER COMPLETE 2 OR MORE VIEWS LEFT    CLINICAL HISTORY:  fall;    COMPARISON:  Left shoulder x-ray April 1, 2014    TECHNIQUE:  Frontal and scapular Y-views of the left shoulder.    FINDINGS:  Acute, mildly displaced fracture involving the proximal humeral metaphysis.  Suspect prior distal clavicular resection.  Cardiac conduction device noted implanted within the left chest.                                       X-Ray Chest AP Portable (Final result)  Result time 10/27/22 17:21:02      Final result by Bang Vicente DO (10/27/22 17:21:02)                   Impression:      Continued cardiomegaly without yuri pulmonary edema or focal consolidating pneumonia.  Acute mildly displaced proximal left humeral metaphyseal fracture.  High-riding right greater than left humeral heads suspicious for sequela of chronic rotator cuff tears. Suspect prior distal clavicular resection.    Point of Service: [facility]      Electronically  signed by: Bang Vicente  Date:    10/27/2022  Time:    17:21               Narrative:    EXAMINATION:  XR CHEST AP PORTABLE    CLINICAL HISTORY:  Unspecified fall, initial encounter    COMPARISON:  Chest x-ray December 9, 2021    TECHNIQUE:  Frontal view/views of the chest.    FINDINGS:  Continued cardiomegaly.  Interval placement of left-sided cardiac conduction device.  Chronic change of the lungs without focal consolidation, pleural effusion, or pneumothorax.  Acute mildly displaced proximal left humeral metaphyseal fracture.  High-riding right greater than left humeral heads suspicious for sequela of chronic rotator cuff tears.  Suspect prior distal clavicular resection.                                       Medications   morphine injection 4 mg (4 mg Intravenous Given 10/27/22 1658)   morphine injection 4 mg (4 mg Intravenous Given 10/28/22 0004)   ondansetron injection 4 mg (4 mg Intravenous Given 10/28/22 0004)     Medical Decision Making:   ED Management:  I discussed pain management at home with patient and her daughter.  They have concerns about her ability to manage at home as she lives alone.  She does have children that come to check on her fairly regularly, but they are concerned about their ability to take care of her.  Daughter says she has had mental status changes when she takes Percocet and Norco dose.  They are requesting swing bed.  They live near [facility].     Discussed case with Dr. Montgomery he states he is not going to do immediate surgery for her.            Attending Attestation:           Physician Attestation for Scribe:  Physician Attestation Statement for Scribe #1: I, Nora Ruth MD, reviewed documentation, as scribed by Cheaynne Saucedo in my presence, and it is both accurate and complete.           ED Course as of 11/02/22 0212   u Oct 27, 2022   2203 CT Head without contrast:   No convincing evidence of acute intracranial hemorrhage.  Probable chronic microvascular  ischemic change and volume loss. [BW]   2204 Xray Chest AP Portable:      Continued cardiomegaly without yuri pulmonary edema or focal consolidating pneumonia.  Acute mildly displaced proximal left humeral metaphyseal fracture.  High-riding right greater than left humeral heads suspicious for sequela of chronic rotator cuff tears. Suspect prior distal clavicular resection. [BW]   2205 Xray shoulder 2 or more views left:   Acute proximal humeral fracture. [BW]      ED Course User Index  [BW] Mary Jane Dong                 Clinical Impression:   Final diagnoses:  [W19.XXXA] Fall  [S42.292A] Other closed displaced fracture of proximal end of left humerus, initial encounter (Primary)      ED Disposition Condition    Transfer to Another Facility Stable                Nora Ritter MD  10/27/22 1748       Nora Ritter MD  10/27/22 1751       Nora Ritter MD  11/02/22 0212

## 2022-10-28 ENCOUNTER — HOSPITAL ENCOUNTER (INPATIENT)
Facility: HOSPITAL | Age: 87
LOS: 20 days | Discharge: HOME | DRG: 561 | End: 2022-11-17
Attending: FAMILY MEDICINE | Admitting: INTERNAL MEDICINE
Payer: MEDICARE

## 2022-10-28 ENCOUNTER — HOSPITAL ENCOUNTER (OUTPATIENT)
Facility: HOSPITAL | Age: 87
Discharge: NURSING, REHAB OR LONG-TERM CARE | End: 2022-10-28
Attending: INTERNAL MEDICINE | Admitting: INTERNAL MEDICINE
Payer: MEDICARE

## 2022-10-28 VITALS
RESPIRATION RATE: 18 BRPM | SYSTOLIC BLOOD PRESSURE: 137 MMHG | DIASTOLIC BLOOD PRESSURE: 83 MMHG | HEIGHT: 68 IN | HEART RATE: 110 BPM | TEMPERATURE: 98 F | OXYGEN SATURATION: 96 % | BODY MASS INDEX: 28.79 KG/M2 | WEIGHT: 190 LBS

## 2022-10-28 VITALS
RESPIRATION RATE: 18 BRPM | HEART RATE: 95 BPM | TEMPERATURE: 99 F | SYSTOLIC BLOOD PRESSURE: 147 MMHG | DIASTOLIC BLOOD PRESSURE: 96 MMHG | WEIGHT: 190 LBS | OXYGEN SATURATION: 96 % | BODY MASS INDEX: 28.79 KG/M2 | HEIGHT: 68 IN

## 2022-10-28 DIAGNOSIS — S42.302D AFTERCARE FOR HEALING TRAUMATIC FRACTURE OF HUMERUS, LEFT: ICD-10-CM

## 2022-10-28 DIAGNOSIS — R42 DIZZINESS: ICD-10-CM

## 2022-10-28 DIAGNOSIS — S42.202A CLOSED FRACTURE OF LEFT PROXIMAL HUMERUS: Primary | ICD-10-CM

## 2022-10-28 DIAGNOSIS — S42.309A HUMERUS FRACTURE: ICD-10-CM

## 2022-10-28 LAB
BACTERIA #/AREA URNS HPF: ABNORMAL /HPF
BILIRUB UR QL STRIP: ABNORMAL
CLARITY UR: CLEAR
COLOR UR: YELLOW
GLUCOSE UR STRIP-MCNC: NEGATIVE MG/DL
HYALINE CASTS #/AREA URNS LPF: ABNORMAL /LPF
KETONES UR STRIP-SCNC: ABNORMAL MG/DL
LEUKOCYTE ESTERASE UR QL STRIP: ABNORMAL
MUCOUS THREADS #/AREA URNS HPF: ABNORMAL /HPF
NITRITE UR QL STRIP: NEGATIVE
PH UR STRIP: 5.5 PH UNITS
PROT UR QL STRIP: NEGATIVE
RBC # UR STRIP: NEGATIVE /UL
RBC #/AREA URNS HPF: ABNORMAL /HPF
SP GR UR STRIP: 1.02
SQUAMOUS #/AREA URNS LPF: ABNORMAL /LPF
UROBILINOGEN UR STRIP-ACNC: 0.2 MG/DL
WBC #/AREA URNS HPF: ABNORMAL /HPF

## 2022-10-28 PROCEDURE — 99900031 HC PATIENT EDUCATION (STAT)

## 2022-10-28 PROCEDURE — 27000221 HC OXYGEN, UP TO 24 HOURS

## 2022-10-28 PROCEDURE — 81003 URINALYSIS AUTO W/O SCOPE: CPT | Performed by: FAMILY MEDICINE

## 2022-10-28 PROCEDURE — 97165 OT EVAL LOW COMPLEX 30 MIN: CPT

## 2022-10-28 PROCEDURE — 94640 AIRWAY INHALATION TREATMENT: CPT | Mod: XB

## 2022-10-28 PROCEDURE — 25000003 PHARM REV CODE 250: Performed by: FAMILY MEDICINE

## 2022-10-28 PROCEDURE — G0378 HOSPITAL OBSERVATION PER HR: HCPCS

## 2022-10-28 PROCEDURE — 25000003 PHARM REV CODE 250: Performed by: SPECIALIST

## 2022-10-28 PROCEDURE — 87077 CULTURE AEROBIC IDENTIFY: CPT | Performed by: FAMILY MEDICINE

## 2022-10-28 PROCEDURE — 11000004 HC SNF PRIVATE

## 2022-10-28 PROCEDURE — 94761 N-INVAS EAR/PLS OXIMETRY MLT: CPT

## 2022-10-28 PROCEDURE — 81001 URINALYSIS AUTO W/SCOPE: CPT | Performed by: FAMILY MEDICINE

## 2022-10-28 PROCEDURE — 99219 PR INITIAL OBSERVATION CARE,LEVL II: CPT | Mod: 25 | Performed by: INTERNAL MEDICINE

## 2022-10-28 PROCEDURE — 97161 PT EVAL LOW COMPLEX 20 MIN: CPT

## 2022-10-28 PROCEDURE — 96374 THER/PROPH/DIAG INJ IV PUSH: CPT

## 2022-10-28 PROCEDURE — 63600175 PHARM REV CODE 636 W HCPCS: Performed by: INTERNAL MEDICINE

## 2022-10-28 PROCEDURE — 25000003 PHARM REV CODE 250: Performed by: INTERNAL MEDICINE

## 2022-10-28 PROCEDURE — 25000242 PHARM REV CODE 250 ALT 637 W/ HCPCS: Performed by: INTERNAL MEDICINE

## 2022-10-28 PROCEDURE — 99900035 HC TECH TIME PER 15 MIN (STAT)

## 2022-10-28 PROCEDURE — 87186 SC STD MICRODIL/AGAR DIL: CPT | Performed by: FAMILY MEDICINE

## 2022-10-28 PROCEDURE — 63600175 PHARM REV CODE 636 W HCPCS: Performed by: EMERGENCY MEDICINE

## 2022-10-28 RX ORDER — HYDROCHLOROTHIAZIDE 12.5 MG/1
12.5 TABLET ORAL DAILY
Status: DISCONTINUED | OUTPATIENT
Start: 2022-10-29 | End: 2022-11-17 | Stop reason: HOSPADM

## 2022-10-28 RX ORDER — LOSARTAN POTASSIUM 25 MG/1
25 TABLET ORAL DAILY
Status: DISCONTINUED | OUTPATIENT
Start: 2022-10-28 | End: 2022-10-28 | Stop reason: HOSPADM

## 2022-10-28 RX ORDER — HYDROCHLOROTHIAZIDE 12.5 MG/1
12.5 TABLET ORAL DAILY
Status: DISCONTINUED | OUTPATIENT
Start: 2022-10-28 | End: 2022-10-28 | Stop reason: HOSPADM

## 2022-10-28 RX ORDER — LOSARTAN POTASSIUM 25 MG/1
25 TABLET ORAL DAILY
Status: DISCONTINUED | OUTPATIENT
Start: 2022-10-29 | End: 2022-11-17 | Stop reason: HOSPADM

## 2022-10-28 RX ORDER — AMOXICILLIN 250 MG
1 CAPSULE ORAL 2 TIMES DAILY
Status: DISCONTINUED | OUTPATIENT
Start: 2022-10-28 | End: 2022-11-17 | Stop reason: HOSPADM

## 2022-10-28 RX ORDER — ATORVASTATIN CALCIUM 10 MG/1
10 TABLET, FILM COATED ORAL NIGHTLY
Status: DISCONTINUED | OUTPATIENT
Start: 2022-10-29 | End: 2022-11-17 | Stop reason: HOSPADM

## 2022-10-28 RX ORDER — OXYCODONE AND ACETAMINOPHEN 10; 325 MG/1; MG/1
TABLET ORAL
Status: DISPENSED
Start: 2022-10-28 | End: 2022-10-29

## 2022-10-28 RX ORDER — LOSARTAN POTASSIUM 25 MG/1
25 TABLET ORAL DAILY
COMMUNITY
Start: 2022-09-07

## 2022-10-28 RX ORDER — PAROXETINE 10 MG/1
20 TABLET, FILM COATED ORAL DAILY
Status: DISCONTINUED | OUTPATIENT
Start: 2022-10-29 | End: 2022-11-17 | Stop reason: HOSPADM

## 2022-10-28 RX ORDER — OXYCODONE AND ACETAMINOPHEN 10; 325 MG/1; MG/1
1 TABLET ORAL EVERY 4 HOURS PRN
Status: DISCONTINUED | OUTPATIENT
Start: 2022-10-28 | End: 2022-11-17

## 2022-10-28 RX ORDER — FLUTICASONE FUROATE AND VILANTEROL 200; 25 UG/1; UG/1
1 POWDER RESPIRATORY (INHALATION) DAILY
Status: DISCONTINUED | OUTPATIENT
Start: 2022-10-29 | End: 2022-11-17 | Stop reason: HOSPADM

## 2022-10-28 RX ORDER — ATORVASTATIN CALCIUM 10 MG/1
10 TABLET, FILM COATED ORAL NIGHTLY
Status: DISCONTINUED | OUTPATIENT
Start: 2022-10-28 | End: 2022-10-28

## 2022-10-28 RX ORDER — ATORVASTATIN CALCIUM 20 MG/1
20 TABLET, FILM COATED ORAL DAILY
Status: DISCONTINUED | OUTPATIENT
Start: 2022-10-28 | End: 2022-10-28 | Stop reason: HOSPADM

## 2022-10-28 RX ORDER — HYDROCHLOROTHIAZIDE 12.5 MG/1
12.5 TABLET ORAL DAILY
Status: DISCONTINUED | OUTPATIENT
Start: 2022-10-28 | End: 2022-10-28 | Stop reason: SDUPTHER

## 2022-10-28 RX ORDER — WARFARIN SODIUM 5 MG/1
5 TABLET ORAL
Status: DISCONTINUED | OUTPATIENT
Start: 2022-10-28 | End: 2022-10-28 | Stop reason: SDUPTHER

## 2022-10-28 RX ORDER — MECLIZINE HCL 12.5 MG 12.5 MG/1
25 TABLET ORAL 3 TIMES DAILY PRN
Status: DISCONTINUED | OUTPATIENT
Start: 2022-10-28 | End: 2022-11-17 | Stop reason: HOSPADM

## 2022-10-28 RX ORDER — BUDESONIDE 0.5 MG/2ML
0.5 INHALANT ORAL EVERY 12 HOURS
Status: DISCONTINUED | OUTPATIENT
Start: 2022-10-28 | End: 2022-10-28 | Stop reason: HOSPADM

## 2022-10-28 RX ORDER — WARFARIN SODIUM 5 MG/1
5 TABLET ORAL
Status: DISCONTINUED | OUTPATIENT
Start: 2022-10-28 | End: 2022-10-28 | Stop reason: HOSPADM

## 2022-10-28 RX ORDER — ACETAMINOPHEN 325 MG/1
650 TABLET ORAL EVERY 6 HOURS PRN
Status: DISCONTINUED | OUTPATIENT
Start: 2022-10-28 | End: 2022-11-17 | Stop reason: HOSPADM

## 2022-10-28 RX ORDER — ATORVASTATIN CALCIUM 10 MG/1
10 TABLET, FILM COATED ORAL NIGHTLY
Status: DISCONTINUED | OUTPATIENT
Start: 2022-10-28 | End: 2022-10-28 | Stop reason: DRUGHIGH

## 2022-10-28 RX ORDER — WARFARIN SODIUM 5 MG/1
5 TABLET ORAL
Status: DISCONTINUED | OUTPATIENT
Start: 2022-10-28 | End: 2022-11-14

## 2022-10-28 RX ORDER — CALCIUM CARBONATE 200(500)MG
500 TABLET,CHEWABLE ORAL 2 TIMES DAILY PRN
Status: DISCONTINUED | OUTPATIENT
Start: 2022-10-28 | End: 2022-11-17 | Stop reason: HOSPADM

## 2022-10-28 RX ORDER — PAROXETINE 10 MG/1
20 TABLET, FILM COATED ORAL DAILY
Status: DISCONTINUED | OUTPATIENT
Start: 2022-10-28 | End: 2022-10-28 | Stop reason: DRUGHIGH

## 2022-10-28 RX ORDER — MECLIZINE HCL 12.5 MG 12.5 MG/1
25 TABLET ORAL 3 TIMES DAILY PRN
Status: DISCONTINUED | OUTPATIENT
Start: 2022-10-28 | End: 2022-10-28 | Stop reason: HOSPADM

## 2022-10-28 RX ORDER — ALBUTEROL SULFATE 0.83 MG/ML
2.5 SOLUTION RESPIRATORY (INHALATION) EVERY 6 HOURS
Status: DISCONTINUED | OUTPATIENT
Start: 2022-10-28 | End: 2022-10-28 | Stop reason: HOSPADM

## 2022-10-28 RX ORDER — BUDESONIDE AND FORMOTEROL FUMARATE DIHYDRATE 160; 4.5 UG/1; UG/1
2 AEROSOL RESPIRATORY (INHALATION) EVERY 12 HOURS
Status: DISCONTINUED | OUTPATIENT
Start: 2022-10-28 | End: 2022-10-28 | Stop reason: RX

## 2022-10-28 RX ORDER — KETOROLAC TROMETHAMINE 30 MG/ML
15 INJECTION, SOLUTION INTRAMUSCULAR; INTRAVENOUS EVERY 6 HOURS PRN
Status: DISCONTINUED | OUTPATIENT
Start: 2022-10-28 | End: 2022-10-28 | Stop reason: HOSPADM

## 2022-10-28 RX ORDER — PAROXETINE 10 MG/1
10 TABLET, FILM COATED ORAL DAILY
Status: DISCONTINUED | OUTPATIENT
Start: 2022-10-28 | End: 2022-10-28 | Stop reason: HOSPADM

## 2022-10-28 RX ORDER — LOSARTAN POTASSIUM 25 MG/1
25 TABLET ORAL DAILY
Status: DISCONTINUED | OUTPATIENT
Start: 2022-10-28 | End: 2022-10-28 | Stop reason: SDUPTHER

## 2022-10-28 RX ORDER — OXYCODONE AND ACETAMINOPHEN 10; 325 MG/1; MG/1
1 TABLET ORAL EVERY 4 HOURS PRN
Status: DISCONTINUED | OUTPATIENT
Start: 2022-10-28 | End: 2022-10-28 | Stop reason: HOSPADM

## 2022-10-28 RX ORDER — TALC
6 POWDER (GRAM) TOPICAL NIGHTLY PRN
Status: DISCONTINUED | OUTPATIENT
Start: 2022-10-28 | End: 2022-11-17 | Stop reason: HOSPADM

## 2022-10-28 RX ORDER — FLUTICASONE FUROATE AND VILANTEROL 100; 25 UG/1; UG/1
1 POWDER RESPIRATORY (INHALATION) DAILY
Status: DISCONTINUED | OUTPATIENT
Start: 2022-10-28 | End: 2022-10-28 | Stop reason: CLARIF

## 2022-10-28 RX ADMIN — ONDANSETRON HYDROCHLORIDE 4 MG: 2 SOLUTION INTRAMUSCULAR; INTRAVENOUS at 12:10

## 2022-10-28 RX ADMIN — ALBUTEROL SULFATE 2.5 MG: 2.5 SOLUTION RESPIRATORY (INHALATION) at 07:10

## 2022-10-28 RX ADMIN — OXYCODONE HYDROCHLORIDE AND ACETAMINOPHEN 1 TABLET: 10; 325 TABLET ORAL at 09:10

## 2022-10-28 RX ADMIN — SENNOSIDES AND DOCUSATE SODIUM 1 TABLET: 50; 8.6 TABLET ORAL at 08:10

## 2022-10-28 RX ADMIN — KETOROLAC TROMETHAMINE 15 MG: 30 INJECTION, SOLUTION INTRAMUSCULAR at 06:10

## 2022-10-28 RX ADMIN — ATORVASTATIN CALCIUM 20 MG: 20 TABLET, FILM COATED ORAL at 08:10

## 2022-10-28 RX ADMIN — HYDROCHLOROTHIAZIDE 12.5 MG: 12.5 TABLET ORAL at 08:10

## 2022-10-28 RX ADMIN — MORPHINE SULFATE 4 MG: 4 INJECTION, SOLUTION INTRAMUSCULAR; INTRAVENOUS at 12:10

## 2022-10-28 RX ADMIN — PAROXETINE HYDROCHLORIDE 10 MG: 10 TABLET, FILM COATED ORAL at 08:10

## 2022-10-28 RX ADMIN — LOSARTAN POTASSIUM 25 MG: 25 TABLET, FILM COATED ORAL at 08:10

## 2022-10-28 RX ADMIN — OXYCODONE HYDROCHLORIDE AND ACETAMINOPHEN 1 TABLET: 10; 325 TABLET ORAL at 10:10

## 2022-10-28 RX ADMIN — BUDESONIDE INHALATION 0.5 MG: 0.5 SUSPENSION RESPIRATORY (INHALATION) at 08:10

## 2022-10-28 RX ADMIN — WARFARIN SODIUM 5 MG: 5 TABLET ORAL at 04:10

## 2022-10-28 NOTE — SUBJECTIVE & OBJECTIVE
Interval History: doing well this AM. Will continue present treatment.    Review of Systems  Objective:     Vital Signs (Most Recent):  Temp: 97.9 °F (36.6 °C) (10/28/22 0735)  Pulse: 110 (10/28/22 0803)  Resp: 20 (10/28/22 0803)  BP: 137/83 (10/28/22 0735)  SpO2: 96 % (10/28/22 0803) Vital Signs (24h Range):  Temp:  [97.4 °F (36.3 °C)-98.8 °F (37.1 °C)] 97.9 °F (36.6 °C)  Pulse:  [] 110  Resp:  [18-20] 20  SpO2:  [93 %-98 %] 96 %  BP: (118-150)/(78-98) 137/83     Weight: 86.2 kg (190 lb)  Body mass index is 28.89 kg/m².  No intake or output data in the 24 hours ending 10/28/22 0823   Physical Exam    Significant Labs: All pertinent labs within the past 24 hours have been reviewed.    Significant Imaging: I have reviewed all pertinent imaging results/findings within the past 24 hours.

## 2022-10-28 NOTE — PT/OT/SLP EVAL
Physical Therapy Evaluation    Patient Name:  Verito Weston   MRN:  [id number]    Recommendations:     Discharge Recommendations:  home with home health   Discharge Equipment Recommendations: 3-in-1 commode, walker, amrit   Barriers to discharge: Decreased caregiver support    Assessment:     Verito Weston is a 89 y.o. female admitted with a medical diagnosis of Closed fracture of left proximal humerus.  She presents with the following impairments/functional limitations:  weakness, impaired endurance, impaired functional mobility, gait instability, impaired balance, decreased lower extremity function, pain. Patient's impairments have resulted in decrease safety and idependence with functional mobility and ADLs resulting in decreased ability to return home at this time.      Rehab Prognosis: Good; patient would benefit from acute skilled PT services to address these deficits and reach maximum level of function.    Recent Surgery: * No surgery found *      Plan:     During this hospitalization, patient to be seen 5 x/week to address the identified rehab impairments via gait training, therapeutic activities, therapeutic exercises and progress toward the following goals:    Plan of Care Expires:  11/18/22    Subjective     Chief Complaint: pain in L shoulder   Patient/Family Comments/goals: improve mobility for safe return home independently   Pain/Comfort:  Pain Rating 1: 4/10  Location - Side 1: Left  Location - Orientation 1: generalized  Location 1: shoulder    Patients cultural, spiritual, Religion conflicts given the current situation: no    Living Environment:  Patient lives alone. She reports having a ramp to enter home.   Prior to admission, patients level of function was modified independent.  Equipment used at home: cane, straight, rollator, walker, rolling, oxygen.  DME owned (not currently used): rolling walker.  Upon discharge, patient will have assistance from unknown.    Objective:     Communicated  with Tiffany Mitchell RN  prior to session.  Patient found HOB elevated with oxygen  upon PT entry to room.    General Precautions: Standard, fall   Orthopedic Precautions:LUE non weight bearing   Braces: UE Sling  Respiratory Status: Nasal cannula, flow 2 L/min    Exams:  RLE Strength: Deficits: 3+/5  LLE Strength: Deficits: 3+/5    Functional Mobility:  Bed Mobility:     Supine to Sit: minimum assistance  Transfers:     Sit to Stand:  minimum assistance with hemiwalker  Bed to Chair: minimum assistance with  hemiwalker  using  Step Transfer  Gait: 6 feet to chair with amrit walker and min A   Balance: Fair       AM-PAC 6 CLICK MOBILITY  Total Score:17       Patient left up in chair with call button in reach.    GOALS:   Multidisciplinary Problems       Physical Therapy Goals       Not on file                    History:     Past Medical History:   Diagnosis Date    Asthma     COPD (chronic obstructive pulmonary disease)     Hx of blood clots     Hypercholesterolemia     Hypertension        Past Surgical History:   Procedure Laterality Date    APPENDECTOMY      cataract surgery       CHOLECYSTECTOMY      HYSTERECTOMY      JOINT REPLACEMENT      left knee    SHOULDER SURGERY Bilateral        Time Tracking:     PT Received On: 10/28/22  PT Start Time: 0826     PT Stop Time: 0835  PT Total Time (min): 9 min     Billable Minutes: Evaluation 9 minutes   John Gomes, PT, DPT         10/28/2022

## 2022-10-28 NOTE — PT/OT/SLP EVAL
Occupational Therapy   Evaluation    Name: Verito Weston  MRN: [id number]  Admitting Diagnosis:  Closed fracture of left proximal humerus  Recent Surgery: * No surgery found *      Recommendations:     Discharge Recommendations: home with home health  Discharge Equipment Recommendations:  3-in-1 commode, walker, amrit  Barriers to discharge:       Assessment:     Verito Weston is a 89 y.o. female with a medical diagnosis of Closed fracture of left proximal humerus. Performance deficits affecting function: weakness, impaired endurance, impaired self care skills, impaired functional mobility, impaired balance, decreased lower extremity function, decreased upper extremity function, decreased safety awareness, pain.      Rehab Prognosis: Good; patient would benefit from acute skilled OT services to address these deficits and reach maximum level of function.       Plan:     Patient to be seen 5 x/week to address the above listed problems via self-care/home management, therapeutic exercises, therapeutic activities  Plan of Care Expires:    Plan of Care Reviewed with: patient    Subjective     Chief Complaint: LUE pain  Patient/Family Comments/goals: Get stronger and go home    Occupational Profile:  Living Environment: lives alone  Previous level of function: independent  Roles and Routines: self-care, home management  Equipment Used at Home:     Assistance upon Discharge:     Pain/Comfort:  Pain Rating 1: 4/10    Patients cultural, spiritual, Synagogue conflicts given the current situation:      Objective:     Communicated with: RN prior to session.  Patient found up in chair with   upon OT entry to room.    General Precautions: Standard, fall   Orthopedic Precautions:LUE non weight bearing   Braces: UE Sling  Respiratory Status: Nasal cannula, flow 2 L/min    Occupational Performance:    Bed Mobility:    Patient completed Rolling/Turning to Left with  minimum assistance    Functional Mobility/Transfers:  Patient  completed Sit <> Stand Transfer with minimum assistance  with  hemiwalker   Functional Mobility: Min a with Hemiwalker    Activities of Daily Living:  Upper Body Dressing: moderate assistance gown    Cognitive/Visual Perceptual:  Cognitive/Psychosocial Skills:     -       Oriented to: Person, Place, Time, and Situation     Physical Exam:  Dominant hand: -       right  Upper Extremity Range of Motion:     -       Right Upper Extremity: WFL  -       Left Upper Extremity: NT due to humeral fracture  Upper Extremity Strength:    -       Right Upper Extremity: WFL  -       Left Upper Extremity: NT due to humeral fx    AMPAC 6 Click ADL:  AMPAC Total Score: 16    Treatment & Education:  Pt educated on OT role/POC.   Importance of OOB activity with staff assistance.  Importance of sitting up in the chair throughout the day as tolerated, especially for meals   Safety during functional t/f and mobility  Importance of assisting with self-care activities       Patient left up in chair with call button in reach    GOALS:   Multidisciplinary Problems       Occupational Therapy Goals          Problem: Occupational Therapy    Goal Priority Disciplines Outcome Interventions   Occupational Therapy Goal     OT, PT/OT     Description: Description: Grooming Status:   Short Term Goal: Pt will perform grooming with min a sitting EOB.   Long Term Goal: Pt will perform grooming/oral hygiene standing at sink with s/u      LE dressing Status:   Short Term Goal: Pt will perform LE dressing with min a.   Long Term Goal: Pt will perform LE dressing with s/u.    Toileting Status:   Short Term Goal: Pt will perform toilet hygiene on BSC with min a.  Long Term Goal: Pt will perform toilet hygiene on toilet with no AE with s/u.    Commode Transfer:   Short Term Goal: Pt will perform BSC t/f with s/u.  Long Term Goal:  Pt will perform toilet t/f in bathroom with mod I.     Bathing Status:   Long Term Goal: Pt will perform sponge bath with s/u with  no unsafe fatigue.     Strength Status:   Long Term Goal: Pt to perform BUE strengthening with weights and/or body weight to increase ADL independence and safety    Endurance Status:   Short Term Goal:pt to perform 15 min OT treatment with 5 or greater rest breaks  Long Term Goal: pt to perform 30 min OT treat with 3 or less rest breaks                       History:     Past Medical History:   Diagnosis Date    Asthma     COPD (chronic obstructive pulmonary disease)     Hx of blood clots     Hypercholesterolemia     Hypertension          Past Surgical History:   Procedure Laterality Date    APPENDECTOMY      cataract surgery       CHOLECYSTECTOMY      HYSTERECTOMY      JOINT REPLACEMENT      left knee    SHOULDER SURGERY Bilateral        Time Tracking:     OT Date of Treatment: 10/28/22  OT Start Time: 1220  OT Stop Time: 1230  OT Total Time (min): 10 min    Billable Minutes:Evaluation 10 min  Leann Rowley OTR/L      10/28/2022

## 2022-10-28 NOTE — PT/OT/SLP EVAL
Occupational Therapy   Evaluation    Name: Verito Weston  MRN: [id number]  Admitting Diagnosis:  Closed fracture of left proximal humerus  Recent Surgery: * No surgery found *      Recommendations:     Discharge Recommendations: home with home health  Discharge Equipment Recommendations:  3-in-1 commode, walker, amrit  Barriers to discharge:       Assessment:     Verito Weston is a 89 y.o. female with a medical diagnosis of Closed fracture of left proximal humerus.  Performance deficits affecting function: weakness, impaired endurance, impaired self care skills, impaired functional mobility, impaired balance, decreased upper extremity function, decreased lower extremity function, decreased safety awareness, pain.      Rehab Prognosis: Good; patient would benefit from acute skilled OT services to address these deficits and reach maximum level of function.       Plan:     Patient to be seen 5 x/week to address the above listed problems via self-care/home management, therapeutic exercises, therapeutic activities  Plan of Care Expires:    Plan of Care Reviewed with: patient    Subjective     Chief Complaint: left arm pain  Patient/Family Comments/goals: get stronger and go home    Occupational Profile:  Living Environment: lives alone  Previous level of function: independent  Roles and Routines: self-care and home management  Equipment Used at Home:  oxygen  Assistance upon Discharge: unsure    Pain/Comfort:  Pain Rating 1: 4/10    Patients cultural, spiritual, Lutheran conflicts given the current situation:      Objective:     Communicated with: RN prior to session.  Patient found with bed in chair position with   upon OT entry to room.    General Precautions: Standard, fall   Orthopedic Precautions:LUE non weight bearing   Braces: UE Sling  Respiratory Status: Nasal cannula, flow 2 L/min    Occupational Performance:    Bed Mobility:    Patient completed Rolling/Turning to Left with  minimum assistance    Functional  Mobility/Transfers:  Patient completed Sit <> Stand Transfer with minimum assistance  with  hemiwalker   Functional Mobility: min a with hemiwalker    Activities of Daily Living:  Upper Body Dressing: moderate assistance gown    Cognitive/Visual Perceptual:  Cognitive/Psychosocial Skills:     -       Oriented to: Person, Place, Time, and Situation   -       Follows Commands/attention:Follows multistep  commands    Physical Exam:  Dominant hand: -       right  Upper Extremity Range of Motion:     -       Right Upper Extremity: immobilized due to humeral fracture  -       Left Upper Extremity: WFL  Upper Extremity Strength:    -       Right Upper Extremity: NT due to humeral fracture  -       Left Upper Extremity: WFL    AMPAC 6 Click ADL:  AMPAC Total Score: 16    Treatment & Education:  Pt educated on OT role/POC.   Importance of OOB activity with staff assistance.  Importance of sitting up in the chair throughout the day as tolerated, especially for meals   Safety during functional t/f and mobility  Importance of assisting with self-care activities       Patient left up in chair with call button in reach    GOALS:   Multidisciplinary Problems       Occupational Therapy Goals       Not on file                    History:     Past Medical History:   Diagnosis Date    Asthma     COPD (chronic obstructive pulmonary disease)     Hx of blood clots     Hypercholesterolemia     Hypertension          Past Surgical History:   Procedure Laterality Date    APPENDECTOMY      cataract surgery       CHOLECYSTECTOMY      HYSTERECTOMY      JOINT REPLACEMENT      left knee    SHOULDER SURGERY Bilateral        Time Tracking:     OT Date of Treatment: 10/28/22  OT Start Time: 0811  OT Stop Time: 0819  OT Total Time (min): 8 min    Billable Minutes:Evaluation 8 min  Leann Rowley OTR/L        10/28/2022

## 2022-10-28 NOTE — NURSING
DR SIMENTAL NOTIFIED OF PT ARRIVAL TO HOSPITAL/VERBAL ORDERS TO RESUME HOME MEDS AND MEDICATION TO GIVE PT FOR PAIN IF NEEDED/ORDERS PLACED

## 2022-10-28 NOTE — PLAN OF CARE
Description: Grooming Status:   Short Term Goal: Pt will perform grooming with min a sitting EOB.   Long Term Goal: Pt will perform grooming/oral hygiene standing at sink with s/u      LE dressing Status:   Short Term Goal: Pt will perform LE dressing with min a.   Long Term Goal: Pt will perform LE dressing with s/u.    Toileting Status:   Short Term Goal: Pt will perform toilet hygiene on BSC with min a.  Long Term Goal: Pt will perform toilet hygiene on toilet with no AE with s/u.    Commode Transfer:   Short Term Goal: Pt will perform BSC t/f with s/u.  Long Term Goal:  Pt will perform toilet t/f in bathroom with mod I.     Bathing Status:   Long Term Goal: Pt will perform sponge bath with s/u with no unsafe fatigue.     Strength Status:   Long Term Goal: Pt to perform BUE strengthening with weights and/or body weight to increase ADL independence and safety    Endurance Status:   Short Term Goal:pt to perform 15 min OT treatment with 5 or greater rest breaks  Long Term Goal: pt to perform 30 min OT treat with 3 or less rest breaks

## 2022-10-28 NOTE — PT/OT/SLP EVAL
Physical Therapy Evaluation    Patient Name:  Verito Weston   MRN:  [id number]    Recommendations:     Discharge Recommendations:  home with home health   Discharge Equipment Recommendations: 3-in-1 commode, walker, amrit   Barriers to discharge: Decreased caregiver support    Assessment:     Verito Weston is a 89 y.o. female admitted with a medical diagnosis of Closed fracture of left proximal humerus.  She presents with the following impairments/functional limitations:  weakness, impaired endurance, impaired functional mobility, gait instability, impaired balance, pain. Patient's impairments have resulted in decrease safety and idependence with functional mobility and ADLs resulting in decreased ability to return home at this time.      Rehab Prognosis: Good; patient would benefit from acute skilled PT services to address these deficits and reach maximum level of function.    Recent Surgery: * No surgery found *      Plan:     During this hospitalization, patient to be seen 5 x/week to address the identified rehab impairments via gait training, therapeutic activities, therapeutic exercises and progress toward the following goals:    Plan of Care Expires:  11/18/22    Subjective     Chief Complaint: Pain and decreased balance   Patient/Family Comments/goals: improved mobility for safe return home independently   Pain/Comfort:  Pain Rating 1: 4/10  Location - Side 1: Left  Location - Orientation 1: generalized  Location 1: shoulder    Patients cultural, spiritual, Yazdanism conflicts given the current situation:      Living Environment:  Patient lives alone. She reports that she has a ramp to enter her home.   Prior to admission, patients level of function was modified independent.  Equipment used at home: cane, straight, rollator, walker, rolling, oxygen.  DME owned (not currently used):  none .  Upon discharge, patient will have assistance from unknown.    Objective:     Patient found HOB elevated with oxygen   upon PT entry to room.    General Precautions: Standard, fall   Orthopedic Precautions:LUE non weight bearing   Braces: UE Sling  Respiratory Status: Nasal cannula, flow 2 L/min    Exams:  RLE Strength: Deficits: 3+/5  LLE Strength: Deficits: 3+/5    Functional Mobility:  Bed Mobility:     Supine to Sit: minimum assistance  Transfers:     Bed to Chair: minimum assistance with  hemiwalker  using  Step Transfer  Gait: 10 feet   Balance: Fair      AM-PAC 6 CLICK MOBILITY  Total Score:17     Patient left up in chair with call button in reach.    GOALS:   Multidisciplinary Problems       Physical Therapy Goals       Not on file                    History:     Past Medical History:   Diagnosis Date    Asthma     COPD (chronic obstructive pulmonary disease)     Hx of blood clots     Hypercholesterolemia     Hypertension        Past Surgical History:   Procedure Laterality Date    APPENDECTOMY      cataract surgery       CHOLECYSTECTOMY      HYSTERECTOMY      JOINT REPLACEMENT      left knee    SHOULDER SURGERY Bilateral        Time Tracking:     PT Received On: 10/28/22  PT Start Time: 1320     PT Stop Time: 1330  PT Total Time (min): 10 min     Billable Minutes: Evaluation 10 minutes  John Gomes, PT, DPT         10/28/2022

## 2022-10-28 NOTE — HOSPITAL COURSE
10/28/22 - pt. Doing well this AM. She has refused therapy. States that someone in [place] told her not to do therapy for 2 weeks because of broken arm. Will investigate this.

## 2022-10-28 NOTE — H&P
Patient is a 89-year-old white female the referred from [facility] for observation rehab appeared this facility secondary to a fall.  The patient was in her stool house yesterday became dizzy and fell down onto her left shoulder.  She was seen at [facility] was found to have a left humeral minimal displaced fracture, Dr. Montgomery, orthopedic doctor did not want to do surgery but said he would do conservative treatment and she could be discharged home.  However the family wanted her to be a monitored and some rehab done.      The patient has a history of vertigo in the past but no workup was seen in the chart.  She had a CT scan of her head, CT scan of the cervical spine, CT scan of her neck.  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  No bleeding was observed.    Past medical history:  [facility] chart     Review of systems:  12 point review of systems noncontributory except between present illness     Physical exam:  Patient is alert oriented she is in no acute distress     HEENT:  Negative     Neck:  Supple no JVD bruits or masses     Lungs: Clear     Cardiovascular: Normal sinus rhythm without murmur gallop rub     GI:  Soft bowel sounds normal nontender     Neurology: No focal neurologic deficits     Musculoskeletal:  Decreased range of motion the left shoulder with some swelling in bruises.    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

## 2022-10-28 NOTE — ED PROVIDER NOTES
Encounter Date: 10/27/2022       History     Chief Complaint   Patient presents with    Fall     HPI  Review of patient's allergies indicates:  No Known Allergies  Past Medical History:   Diagnosis Date    Asthma     COPD (chronic obstructive pulmonary disease)     Hx of blood clots     Hypercholesterolemia     Hypertension      Past Surgical History:   Procedure Laterality Date    APPENDECTOMY      cataract surgery       CHOLECYSTECTOMY      HYSTERECTOMY      JOINT REPLACEMENT      left knee    SHOULDER SURGERY Bilateral      History reviewed. No pertinent family history.  Social History     Tobacco Use    Smoking status: Never    Smokeless tobacco: Never   Substance Use Topics    Alcohol use: Not Currently    Drug use: Not Currently     Review of Systems    Physical Exam     Initial Vitals   BP Pulse Resp Temp SpO2   10/27/22 1639 10/27/22 1633 10/27/22 1633 10/27/22 1633 10/27/22 1633   (!) 150/81 94 18 98.8 °F (37.1 °C) 96 %      MAP       --                Physical Exam    ED Course   Procedures  Labs Reviewed   COMPREHENSIVE METABOLIC PANEL - Abnormal; Notable for the following components:       Result Value    Glucose 119 (*)     BUN/Creatinine Ratio 21 (*)     Total Protein 6.3 (*)     All other components within normal limits   CBC WITH DIFFERENTIAL - Abnormal; Notable for the following components:    WBC 12.48 (*)     MCHC 30.8 (*)     Neutrophils % 76.4 (*)     Lymphocytes % 12.2 (*)     Monocytes % 9.1 (*)     Neutrophils, Abs 9.54 (*)     Monocytes, Absolute 1.14 (*)     Immature Granulocytes, Absolute 0.05 (*)     All other components within normal limits   PROTIME-INR - Abnormal; Notable for the following components:    PT 20.3 (*)     All other components within normal limits   APTT - Normal   CBC W/ AUTO DIFFERENTIAL    Narrative:     The following orders were created for panel order CBC auto differential.  Procedure                               Abnormality         Status                      ---------                               -----------         ------                     CBC with Differential[[id number]]        Abnormal            Final result                 Please view results for these tests on the individual orders.          Imaging Results              CT Cervical Spine Without Contrast (In process)                      CT Head Without Contrast (Final result)  Result time 10/27/22 19:54:14      Final result by Bang Vicente DO (10/27/22 19:54:14)                   Impression:      No convincing evidence of acute intracranial hemorrhage.  Probable chronic microvascular ischemic change and volume loss.    The CT exam was performed using one or more of the following dose    reduction techniques- Automated exposure control, adjustment of the mA    and/or kV according to patient size, and/or use of iterative    reconstructed technique.    Point of Service: [facility]      Electronically signed by: Bang Vicente  Date:    10/27/2022  Time:    19:54               Narrative:    EXAMINATION:  CT HEAD WITHOUT CONTRAST    CLINICAL HISTORY:  Head trauma, moderate-severe;    COMPARISON:  CT brain December 16, 2013    TECHNIQUE:  Multiple axial tomographic images of the brain were obtained without the use of intravenous contrast.    FINDINGS:  Midline structures are nondisplaced.  No convincing evidence of acute intracranial hemorrhage.  No convincing evidence of hydrocephalus.  Moderate global volume loss demonstrated.  Mild periventricular and subcortical hypoattenuation noted which is nonspecific but consistent with chronic microvascular ischemic change. Less likely considerations include demyelinating process and vasculitis. Visualized paranasal sinuses and mastoid air cells are predominantly clear.                                       X-Ray Shoulder 2 or More Views Left (Final result)  Result time 10/27/22 17:19:31      Final result by Bang Vicente DO (10/27/22 17:19:31)                    Impression:      Acute proximal humeral fracture.    Point of Service: [facility]      Electronically signed by: Bang Vicente  Date:    10/27/2022  Time:    17:19               Narrative:    EXAMINATION:  XR SHOULDER COMPLETE 2 OR MORE VIEWS LEFT    CLINICAL HISTORY:  fall;    COMPARISON:  Left shoulder x-ray April 1, 2014    TECHNIQUE:  Frontal and scapular Y-views of the left shoulder.    FINDINGS:  Acute, mildly displaced fracture involving the proximal humeral metaphysis.  Suspect prior distal clavicular resection.  Cardiac conduction device noted implanted within the left chest.                                       X-Ray Chest AP Portable (Final result)  Result time 10/27/22 17:21:02      Final result by Bang Vicente DO (10/27/22 17:21:02)                   Impression:      Continued cardiomegaly without yuri pulmonary edema or focal consolidating pneumonia.  Acute mildly displaced proximal left humeral metaphyseal fracture.  High-riding right greater than left humeral heads suspicious for sequela of chronic rotator cuff tears. Suspect prior distal clavicular resection.    Point of Service: [facility]      Electronically signed by: Bang Vicente  Date:    10/27/2022  Time:    17:21               Narrative:    EXAMINATION:  XR CHEST AP PORTABLE    CLINICAL HISTORY:  Unspecified fall, initial encounter    COMPARISON:  Chest x-ray December 9, 2021    TECHNIQUE:  Frontal view/views of the chest.    FINDINGS:  Continued cardiomegaly.  Interval placement of left-sided cardiac conduction device.  Chronic change of the lungs without focal consolidation, pleural effusion, or pneumothorax.  Acute mildly displaced proximal left humeral metaphyseal fracture.  High-riding right greater than left humeral heads suspicious for sequela of chronic rotator cuff tears.  Suspect prior distal clavicular resection.                                    X-Rays:   Independently Interpreted Readings:    Chest X-Ray: Xray Chest AP Portable:    Continued cardiomegaly without yuri pulmonary edema or focal consolidating pneumonia.  Acute mildly displaced proximal left humeral metaphyseal fracture.  High-riding right greater than left humeral heads suspicious for sequela of chronic rotator cuff tears. Suspect prior distal clavicular resection.   Head CT: Head CT:   No convincing evidence of acute intracranial hemorrhage.  Probable chronic microvascular ischemic change and volume loss.   Other Readings:  Xray shoulder 2 or more views left:   Acute proximal humeral fracture.        Medications   morphine injection 4 mg (4 mg Intravenous Given 10/27/22 1658)                 ED Course as of 10/27/22 2205   u Oct 27, 2022   2203 CT Head without contrast:   No convincing evidence of acute intracranial hemorrhage.  Probable chronic microvascular ischemic change and volume loss. [BW]   2204 Xray Chest AP Portable:      Continued cardiomegaly without yuri pulmonary edema or focal consolidating pneumonia.  Acute mildly displaced proximal left humeral metaphyseal fracture.  High-riding right greater than left humeral heads suspicious for sequela of chronic rotator cuff tears. Suspect prior distal clavicular resection. [BW]   2205 Xray shoulder 2 or more views left:   Acute proximal humeral fracture. [BW]      ED Course User Index  [BW] Mary Jane Dong                   Clinical Impression:   Final diagnoses:  [W19.XXXA] Fall  [S42.292A] Other closed displaced fracture of proximal end of left humerus, initial encounter (Primary)        ED Disposition Condition    Transfer to Another Facility Stable

## 2022-10-28 NOTE — PROGRESS NOTES
[facility]  Medical Surgical Unit  Hospital Medicine  Progress Note    Patient Name: Verito Weston  MRN: [id number]  Patient Class: OP- Observation   Admission Date: 10/28/2022  Length of Stay: 0 days  Attending Physician: Robinson Singletary MD  Primary Care Provider: Maricel Avila MD        Subjective:     Principal Problem:Closed fracture of left proximal humerus        HPI:  No notes on file    Overview/Hospital Course:  10/28/22 - pt. Doing well this AM. She has refused therapy. States that someone in [place] told her not to do therapy for 2 weeks because of broken arm. Will investigate this.      Interval History: doing well this AM. Will continue present treatment.    Review of Systems  Objective:     Vital Signs (Most Recent):  Temp: 97.9 °F (36.6 °C) (10/28/22 0735)  Pulse: 110 (10/28/22 0803)  Resp: 20 (10/28/22 0803)  BP: 137/83 (10/28/22 0735)  SpO2: 96 % (10/28/22 0803) Vital Signs (24h Range):  Temp:  [97.4 °F (36.3 °C)-98.8 °F (37.1 °C)] 97.9 °F (36.6 °C)  Pulse:  [] 110  Resp:  [18-20] 20  SpO2:  [93 %-98 %] 96 %  BP: (118-150)/(78-98) 137/83     Weight: 86.2 kg (190 lb)  Body mass index is 28.89 kg/m².  No intake or output data in the 24 hours ending 10/28/22 0823   Physical Exam    Significant Labs: All pertinent labs within the past 24 hours have been reviewed.    Significant Imaging: I have reviewed all pertinent imaging results/findings within the past 24 hours.      Assessment/Plan:      * Closed fracture of left proximal humerus          VTE Risk Mitigation (From admission, onward)         Ordered     warfarin (COUMADIN) tablet 5 mg  Every Mon, Wed, Fri         10/28/22 4129                Discharge Planning   TREASURE:      Code Status: Full Code   Is the patient medically ready for discharge?:     Reason for patient still in hospital (select all that apply): Patient trending condition                     Catrachita Hyman MD  Department of Hospital Medicine   [facility] - Medical Surgical Unit

## 2022-10-29 PROCEDURE — 94640 AIRWAY INHALATION TREATMENT: CPT

## 2022-10-29 PROCEDURE — 27000221 HC OXYGEN, UP TO 24 HOURS

## 2022-10-29 PROCEDURE — 25000003 PHARM REV CODE 250: Performed by: SPECIALIST

## 2022-10-29 PROCEDURE — 11000004 HC SNF PRIVATE

## 2022-10-29 PROCEDURE — 25000003 PHARM REV CODE 250: Performed by: FAMILY MEDICINE

## 2022-10-29 PROCEDURE — 25000242 PHARM REV CODE 250 ALT 637 W/ HCPCS: Performed by: FAMILY MEDICINE

## 2022-10-29 PROCEDURE — 94761 N-INVAS EAR/PLS OXIMETRY MLT: CPT

## 2022-10-29 RX ORDER — IPRATROPIUM BROMIDE AND ALBUTEROL SULFATE 2.5; .5 MG/3ML; MG/3ML
3 SOLUTION RESPIRATORY (INHALATION) EVERY 6 HOURS PRN
Status: DISCONTINUED | OUTPATIENT
Start: 2022-10-29 | End: 2022-11-17 | Stop reason: HOSPADM

## 2022-10-29 RX ORDER — POLYETHYLENE GLYCOL 3350 17 G/17G
17 POWDER, FOR SOLUTION ORAL 2 TIMES DAILY PRN
Status: DISCONTINUED | OUTPATIENT
Start: 2022-10-29 | End: 2022-11-17 | Stop reason: HOSPADM

## 2022-10-29 RX ADMIN — HYDROCHLOROTHIAZIDE 12.5 MG: 12.5 TABLET ORAL at 08:10

## 2022-10-29 RX ADMIN — PAROXETINE 20 MG: 10 TABLET, FILM COATED ORAL at 08:10

## 2022-10-29 RX ADMIN — LOSARTAN POTASSIUM 25 MG: 25 TABLET, FILM COATED ORAL at 08:10

## 2022-10-29 RX ADMIN — OXYCODONE HYDROCHLORIDE AND ACETAMINOPHEN 1 TABLET: 10; 325 TABLET ORAL at 04:10

## 2022-10-29 RX ADMIN — SENNOSIDES AND DOCUSATE SODIUM 1 TABLET: 50; 8.6 TABLET ORAL at 08:10

## 2022-10-29 RX ADMIN — FLUTICASONE FUROATE AND VILANTEROL TRIFENATATE 1 PUFF: 200; 25 POWDER RESPIRATORY (INHALATION) at 08:10

## 2022-10-29 RX ADMIN — OXYCODONE HYDROCHLORIDE AND ACETAMINOPHEN 1 TABLET: 10; 325 TABLET ORAL at 02:10

## 2022-10-29 RX ADMIN — IPRATROPIUM BROMIDE AND ALBUTEROL SULFATE 3 ML: 2.5; .5 SOLUTION RESPIRATORY (INHALATION) at 10:10

## 2022-10-29 RX ADMIN — ATORVASTATIN CALCIUM 10 MG: 10 TABLET, FILM COATED ORAL at 08:10

## 2022-10-29 NOTE — NURSING
Called to Dr Crowder and advised that the sling that the patient is currently wearing is not holding her left arm in place and is irritating the skin on her neck. New order for shoulder immobilizer obtained and entered.

## 2022-10-29 NOTE — NURSING
2130- Patient c/o moderate pain to left shoulder and arm. She states she cannot get comfortable and wants something for pain other than tylenol. Pt repositioned with pillows for comfort.  Notified MD of pt request and condition. New orders received for pain medication.

## 2022-10-29 NOTE — NURSING
Left shoulder immobilizer applied, pt sitting up on side of bed for application. Pt reports that immobilizer provides more support and relief of pain in neck and in her shoulder. Family at bedside. Pt signed 3rd party billing for immobilizer, copy in file.  Pt's family brought in 5 packets of her home eye drops, drops placed in pt's bin in med room.

## 2022-10-30 LAB — UA COMPLETE W REFLEX CULTURE PNL UR: ABNORMAL

## 2022-10-30 PROCEDURE — 11000004 HC SNF PRIVATE

## 2022-10-30 PROCEDURE — 25000242 PHARM REV CODE 250 ALT 637 W/ HCPCS: Performed by: FAMILY MEDICINE

## 2022-10-30 PROCEDURE — 27000221 HC OXYGEN, UP TO 24 HOURS

## 2022-10-30 PROCEDURE — 25000003 PHARM REV CODE 250: Performed by: SPECIALIST

## 2022-10-30 PROCEDURE — 25000003 PHARM REV CODE 250: Performed by: FAMILY MEDICINE

## 2022-10-30 PROCEDURE — 94640 AIRWAY INHALATION TREATMENT: CPT

## 2022-10-30 PROCEDURE — 99900035 HC TECH TIME PER 15 MIN (STAT)

## 2022-10-30 PROCEDURE — 94761 N-INVAS EAR/PLS OXIMETRY MLT: CPT

## 2022-10-30 RX ORDER — BISACODYL 10 MG
10 SUPPOSITORY, RECTAL RECTAL DAILY PRN
Status: DISCONTINUED | OUTPATIENT
Start: 2022-10-30 | End: 2022-11-17 | Stop reason: HOSPADM

## 2022-10-30 RX ADMIN — IPRATROPIUM BROMIDE AND ALBUTEROL SULFATE 3 ML: 2.5; .5 SOLUTION RESPIRATORY (INHALATION) at 07:10

## 2022-10-30 RX ADMIN — ATORVASTATIN CALCIUM 10 MG: 10 TABLET, FILM COATED ORAL at 08:10

## 2022-10-30 RX ADMIN — SENNOSIDES AND DOCUSATE SODIUM 1 TABLET: 50; 8.6 TABLET ORAL at 08:10

## 2022-10-30 RX ADMIN — OXYCODONE HYDROCHLORIDE AND ACETAMINOPHEN 1 TABLET: 10; 325 TABLET ORAL at 08:10

## 2022-10-30 RX ADMIN — OXYCODONE HYDROCHLORIDE AND ACETAMINOPHEN 1 TABLET: 10; 325 TABLET ORAL at 04:10

## 2022-10-30 RX ADMIN — PAROXETINE 20 MG: 10 TABLET, FILM COATED ORAL at 08:10

## 2022-10-30 RX ADMIN — LOSARTAN POTASSIUM 25 MG: 25 TABLET, FILM COATED ORAL at 08:10

## 2022-10-30 RX ADMIN — HYDROCHLOROTHIAZIDE 12.5 MG: 12.5 TABLET ORAL at 08:10

## 2022-10-30 RX ADMIN — IPRATROPIUM BROMIDE AND ALBUTEROL SULFATE 3 ML: 2.5; .5 SOLUTION RESPIRATORY (INHALATION) at 01:10

## 2022-10-30 RX ADMIN — FLUTICASONE FUROATE AND VILANTEROL TRIFENATATE 1 PUFF: 200; 25 POWDER RESPIRATORY (INHALATION) at 08:10

## 2022-10-30 RX ADMIN — POLYETHYLENE GLYCOL 3350 17 G: 17 POWDER, FOR SOLUTION ORAL at 08:10

## 2022-10-30 NOTE — PLAN OF CARE
Problem: Fall Injury Risk  Goal: Absence of Fall and Fall-Related Injury  Outcome: Ongoing, Progressing  Intervention: Identify and Manage Contributors  Flowsheets (Taken 10/30/2022 1732)  Self-Care Promotion:   independence encouraged   safe use of adaptive equipment encouraged  Medication Review/Management: medications reviewed  Intervention: Promote Injury-Free Environment  Flowsheets (Taken 10/30/2022 1732)  Safety Promotion/Fall Prevention: assistive device/personal item within reach

## 2022-10-31 LAB
INR BLD: 1.29
PROTHROMBIN TIME: 16.1 SECONDS (ref 11.7–14.7)

## 2022-10-31 PROCEDURE — 99900035 HC TECH TIME PER 15 MIN (STAT)

## 2022-10-31 PROCEDURE — 94761 N-INVAS EAR/PLS OXIMETRY MLT: CPT

## 2022-10-31 PROCEDURE — 27000221 HC OXYGEN, UP TO 24 HOURS

## 2022-10-31 PROCEDURE — 94640 AIRWAY INHALATION TREATMENT: CPT

## 2022-10-31 PROCEDURE — 11000004 HC SNF PRIVATE

## 2022-10-31 PROCEDURE — 25000242 PHARM REV CODE 250 ALT 637 W/ HCPCS: Performed by: FAMILY MEDICINE

## 2022-10-31 PROCEDURE — 85610 PROTHROMBIN TIME: CPT | Performed by: FAMILY MEDICINE

## 2022-10-31 PROCEDURE — 97110 THERAPEUTIC EXERCISES: CPT

## 2022-10-31 PROCEDURE — 36415 COLL VENOUS BLD VENIPUNCTURE: CPT | Performed by: FAMILY MEDICINE

## 2022-10-31 PROCEDURE — 25000003 PHARM REV CODE 250: Performed by: FAMILY MEDICINE

## 2022-10-31 PROCEDURE — 97116 GAIT TRAINING THERAPY: CPT

## 2022-10-31 PROCEDURE — 25000003 PHARM REV CODE 250: Performed by: SPECIALIST

## 2022-10-31 RX ORDER — SULFAMETHOXAZOLE AND TRIMETHOPRIM 400; 80 MG/1; MG/1
1 TABLET ORAL 2 TIMES DAILY
Status: COMPLETED | OUTPATIENT
Start: 2022-10-31 | End: 2022-11-06

## 2022-10-31 RX ORDER — WARFARIN 2.5 MG/1
2.5 TABLET ORAL
Status: ON HOLD | COMMUNITY
End: 2022-11-17

## 2022-10-31 RX ORDER — WARFARIN 2.5 MG/1
2.5 TABLET ORAL
Status: DISCONTINUED | OUTPATIENT
Start: 2022-11-01 | End: 2022-11-14

## 2022-10-31 RX ADMIN — SULFAMETHOXAZOLE AND TRIMETHOPRIM 1 TABLET: 400; 80 TABLET ORAL at 08:10

## 2022-10-31 RX ADMIN — OXYCODONE HYDROCHLORIDE AND ACETAMINOPHEN 1 TABLET: 10; 325 TABLET ORAL at 08:10

## 2022-10-31 RX ADMIN — IPRATROPIUM BROMIDE AND ALBUTEROL SULFATE 3 ML: 2.5; .5 SOLUTION RESPIRATORY (INHALATION) at 07:10

## 2022-10-31 RX ADMIN — PAROXETINE 20 MG: 10 TABLET, FILM COATED ORAL at 08:10

## 2022-10-31 RX ADMIN — SENNOSIDES AND DOCUSATE SODIUM 1 TABLET: 50; 8.6 TABLET ORAL at 08:10

## 2022-10-31 RX ADMIN — ATORVASTATIN CALCIUM 10 MG: 10 TABLET, FILM COATED ORAL at 08:10

## 2022-10-31 RX ADMIN — OXYCODONE HYDROCHLORIDE AND ACETAMINOPHEN 1 TABLET: 10; 325 TABLET ORAL at 01:10

## 2022-10-31 RX ADMIN — WARFARIN SODIUM 5 MG: 5 TABLET ORAL at 04:10

## 2022-10-31 RX ADMIN — FLUTICASONE FUROATE AND VILANTEROL TRIFENATATE 1 PUFF: 200; 25 POWDER RESPIRATORY (INHALATION) at 08:10

## 2022-10-31 RX ADMIN — LOSARTAN POTASSIUM 25 MG: 25 TABLET, FILM COATED ORAL at 08:10

## 2022-10-31 RX ADMIN — HYDROCHLOROTHIAZIDE 12.5 MG: 12.5 TABLET ORAL at 08:10

## 2022-10-31 NOTE — PROGRESS NOTES
[facility] - Medical Surgical [facility]  Hospital Medicine  Progress Note    Patient Name: Verito Weston  MRN: [id number]  Patient Class: IP- Swing   Admission Date: 10/28/2022  Length of Stay: 3 days  Attending Physician: Catrachita Hyman, *  Primary Care Provider: Maricel Avila MD        Subjective:     Principal Problem:Closed fracture of left proximal humerus        HPI:  No notes on file    Overview/Hospital Course:  10/31/22 - pt. Doing well this AM. No new complaints.      Interval History: doing well. No complaints voiced.    Review of Systems  Objective:     Vital Signs (Most Recent):  Temp: 97.9 °F (36.6 °C) (10/30/22 0819)  Pulse: 104 (10/31/22 0805)  Resp: 18 (10/31/22 0805)  BP: 130/77 (10/30/22 0819)  SpO2: 95 % (10/31/22 0805) Vital Signs (24h Range):  Temp:  [97.9 °F (36.6 °C)] 97.9 °F (36.6 °C)  Pulse:  [] 104  Resp:  [18-20] 18  SpO2:  [93 %-98 %] 95 %  BP: (130)/(77) 130/77     Weight: 87.4 kg (192 lb 10.9 oz)  Body mass index is 29.3 kg/m².  No intake or output data in the 24 hours ending 10/31/22 0810   Physical Exam    Significant Labs: All pertinent labs within the past 24 hours have been reviewed.    Significant Imaging: I have reviewed all pertinent imaging results/findings within the past 24 hours.      Assessment/Plan:      No notes have been filed under this hospital service.  Service: Hospital Medicine    VTE Risk Mitigation (From admission, onward)         Ordered     warfarin (COUMADIN) tablet 5 mg  Every Mon, Wed, Fri        Question:  Is the patient competent?  Answer:  Yes    10/28/22 1401                Discharge Planning   TREASURE:      Code Status: Full Code   Is the patient medically ready for discharge?:     Reason for patient still in hospital (select all that apply): Patient trending condition                     Catrachita Hyman MD  Department of Hospital Medicine   [facility] - Medical Surgical Unit

## 2022-10-31 NOTE — SUBJECTIVE & OBJECTIVE
Interval History: doing well. No complaints voiced.    Review of Systems  Objective:     Vital Signs (Most Recent):  Temp: 97.9 °F (36.6 °C) (10/30/22 0819)  Pulse: 104 (10/31/22 0805)  Resp: 18 (10/31/22 0805)  BP: 130/77 (10/30/22 0819)  SpO2: 95 % (10/31/22 0805) Vital Signs (24h Range):  Temp:  [97.9 °F (36.6 °C)] 97.9 °F (36.6 °C)  Pulse:  [] 104  Resp:  [18-20] 18  SpO2:  [93 %-98 %] 95 %  BP: (130)/(77) 130/77     Weight: 87.4 kg (192 lb 10.9 oz)  Body mass index is 29.3 kg/m².  No intake or output data in the 24 hours ending 10/31/22 0810   Physical Exam    Significant Labs: All pertinent labs within the past 24 hours have been reviewed.    Significant Imaging: I have reviewed all pertinent imaging results/findings within the past 24 hours.

## 2022-10-31 NOTE — PT/OT/SLP PROGRESS
"Occupational Therapy  Treatment    Verito Weston   MRN: [id number]   Admitting Diagnosis: Closed fracture of left proximal humerus    OT Date of Treatment: 10/31/22   OT Start Time: 1403  OT Stop Time: 1436  OT Total Time (min): 33 min    Billable Minutes:  Therapeutic Exercise 33 min               General Precautions: Standard, fall  Orthopedic Precautions: LUE non weight bearing  Braces: UE Sling  Respiratory Status: Nasal cannula, flow 2 L/min         Subjective:  Communicated with RN prior to session.    Pain/Comfort  Pain Rating 1: 4/10    Objective:        Functional Mobility:  Bed Mobility:       Transfers:        Functional Ambulation:     Activities of Daily Living:       Therapeutic Activities and Exercises:  Patient completed the following for increased strength to increase I with ADLs: 1# DB RUE- shoulder press, chest press, bicep curls x 30, red theraband- tricep extension RUE x 30; green handgripper x 30 RUE      AM-PAC 6 CLICK ADL   How much help from another person does this patient currently need?   1 = Unable, Total/Dependent Assistance  2 = A lot, Maximum/Moderate Assistance  3 = A little, Minimum/Contact Guard/Supervision  4 = None, Modified [facility]/Independent    Putting on and taking off regular lower body clothing? : 2  Bathing (including washing, rinsing, drying)?: 2  Toileting, which includes using toilet, bedpan, or urinal? : 3  Putting on and taking off regular upper body clothing?: 2  Taking care of personal grooming such as brushing teeth?: 3  Eating meals?: 4  Daily Activity Total Score: 16     AM-PAC Raw Score CMS "G-Code Modifier Level of Impairment Assistance   6 % Total / Unable   7 - 8 CM 80 - 100% Maximal Assist   9-13 CL 60 - 80% Moderate Assist   14 - 19 CK 40 - 60% Moderate Assist   20 - 22 CJ 20 - 40% Minimal Assist   23 CI 1-20% SBA / CGA   24 CH 0% Independent/ Mod I       Patient left up in chair with call button in reach    ASSESSMENT:  Verito Weston is a 89 " y.o. female with a medical diagnosis of Closed fracture of left proximal humerus and presents with decreased strength, decreased endurance, decreased self-care.    Rehab identified problem list/impairments: Rehab identified problem list/impairments: weakness, impaired endurance, impaired self care skills, impaired functional mobility, impaired balance, decreased lower extremity function, decreased upper extremity function, decreased safety awareness    Rehab potential is good.    Activity tolerance: Good    Discharge recommendations: Discharge Facility/Level of Care Needs: home with home health     Barriers to discharge:      Equipment recommendations: 3-in-1 commode     GOALS:   Multidisciplinary Problems       Occupational Therapy Goals          Problem: Occupational Therapy    Goal Priority Disciplines Outcome Interventions   Occupational Therapy Goal     OT, PT/OT     Description: Description: Grooming Status:   Short Term Goal: Pt will perform grooming with min a sitting EOB.   Long Term Goal: Pt will perform grooming/oral hygiene standing at sink with s/u      LE dressing Status:   Short Term Goal: Pt will perform LE dressing with min a.   Long Term Goal: Pt will perform LE dressing with s/u.    Toileting Status:   Short Term Goal: Pt will perform toilet hygiene on BSC with min a.  Long Term Goal: Pt will perform toilet hygiene on toilet with no AE with s/u.    Commode Transfer:   Short Term Goal: Pt will perform BSC t/f with s/u.  Long Term Goal:  Pt will perform toilet t/f in bathroom with mod I.     Bathing Status:   Long Term Goal: Pt will perform sponge bath with s/u with no unsafe fatigue.     Strength Status:   Long Term Goal: Pt to perform BUE strengthening with weights and/or body weight to increase ADL independence and safety    Endurance Status:   Short Term Goal:pt to perform 15 min OT treatment with 5 or greater rest breaks  Long Term Goal: pt to perform 30 min OT treat with 3 or less rest breaks                        Plan:  Patient to be seen 5 x/week to address the above listed problems via therapeutic exercises  Plan of Care expires:    Plan of Care reviewed with: patient       Leann Amrik, OTR/ZULEYMA    10/31/2022

## 2022-10-31 NOTE — HOSPITAL COURSE
10/31/22 - pt. Doing well this AM. No new complaints.    11/7/22 - pt. Doing well. Ready for therapy.    11/11/22 - doing well. Will continue present treatment.    11/14/22 - doing well. No complaints voiced.    11/17/22 - pt. Is ready to go home. Her INR is slightly low on the coumadin 5 mg daily. She will be followed by Dr. FALGUNI Avila concerning this. She will continue therapy at home with home health.

## 2022-11-01 PROCEDURE — 94761 N-INVAS EAR/PLS OXIMETRY MLT: CPT

## 2022-11-01 PROCEDURE — 25000003 PHARM REV CODE 250: Performed by: FAMILY MEDICINE

## 2022-11-01 PROCEDURE — 97535 SELF CARE MNGMENT TRAINING: CPT

## 2022-11-01 PROCEDURE — 25000242 PHARM REV CODE 250 ALT 637 W/ HCPCS: Performed by: FAMILY MEDICINE

## 2022-11-01 PROCEDURE — 94640 AIRWAY INHALATION TREATMENT: CPT

## 2022-11-01 PROCEDURE — 27000221 HC OXYGEN, UP TO 24 HOURS

## 2022-11-01 PROCEDURE — 25000003 PHARM REV CODE 250: Performed by: SPECIALIST

## 2022-11-01 PROCEDURE — 97110 THERAPEUTIC EXERCISES: CPT

## 2022-11-01 PROCEDURE — 11000004 HC SNF PRIVATE

## 2022-11-01 PROCEDURE — 99900035 HC TECH TIME PER 15 MIN (STAT)

## 2022-11-01 PROCEDURE — 97110 THERAPEUTIC EXERCISES: CPT | Mod: CQ

## 2022-11-01 PROCEDURE — 97116 GAIT TRAINING THERAPY: CPT | Mod: CQ

## 2022-11-01 PROCEDURE — 27000980 HC MATTRESS, OVERLAY WAFFLE

## 2022-11-01 RX ADMIN — ATORVASTATIN CALCIUM 10 MG: 10 TABLET, FILM COATED ORAL at 08:11

## 2022-11-01 RX ADMIN — LOSARTAN POTASSIUM 25 MG: 25 TABLET, FILM COATED ORAL at 08:11

## 2022-11-01 RX ADMIN — SULFAMETHOXAZOLE AND TRIMETHOPRIM 1 TABLET: 400; 80 TABLET ORAL at 08:11

## 2022-11-01 RX ADMIN — IPRATROPIUM BROMIDE AND ALBUTEROL SULFATE 3 ML: 2.5; .5 SOLUTION RESPIRATORY (INHALATION) at 07:11

## 2022-11-01 RX ADMIN — SENNOSIDES AND DOCUSATE SODIUM 1 TABLET: 50; 8.6 TABLET ORAL at 08:11

## 2022-11-01 RX ADMIN — HYDROCHLOROTHIAZIDE 12.5 MG: 12.5 TABLET ORAL at 08:11

## 2022-11-01 RX ADMIN — WARFARIN SODIUM 2.5 MG: 2.5 TABLET ORAL at 04:11

## 2022-11-01 RX ADMIN — PAROXETINE 20 MG: 10 TABLET, FILM COATED ORAL at 08:11

## 2022-11-01 RX ADMIN — OXYCODONE HYDROCHLORIDE AND ACETAMINOPHEN 1 TABLET: 10; 325 TABLET ORAL at 08:11

## 2022-11-01 RX ADMIN — POLYETHYLENE GLYCOL 3350 17 G: 17 POWDER, FOR SOLUTION ORAL at 08:11

## 2022-11-01 RX ADMIN — FLUTICASONE FUROATE AND VILANTEROL TRIFENATATE 1 PUFF: 200; 25 POWDER RESPIRATORY (INHALATION) at 08:11

## 2022-11-01 NOTE — PT/OT/SLP PROGRESS
"Physical Therapy  Treatment    Verito Weston   MRN: [id number]   Admitting Diagnosis: Closed fracture of left proximal humerus    PT Received On: 10/31/22  PT Start Time: 0845     PT Stop Time: 0930    PT Total Time (min): 45 min       Billable Minutes:  Gait Training 10 and Therapeutic Exercise 35    Treatment Type: Treatment  PT/PTA: PT     PTA Visit Number: 0       General Precautions: Standard, fall  Orthopedic Precautions: LUE non weight bearing   Braces:    Respiratory Status: Nasal cannula, flow 2 L/min         Subjective:  Communicated with RN prior to session.  "My arm is hurting so bad."          Objective:        Functional Mobility:  Bed Mobility: not performed       Transfers: sit <> stand min A x 1       Gait: 100' with SPC due to safety concern with hemiwalker       Stairs: Not performed    Therapeutic Activities and Exercises:    Ther- Ex    Nustep    Ankle pumps 20 x    Hip adduction 30 x 3"   Heelslides    LAQ's 30 x 1.5#   Hamstring Curls 20 x red tband   Quad sets  30 x 3"   SAQ's 30x 3"   Straight Leg Raises    Hip abduction 30 x red tband   Horizontal ADD/ABD 30 x each            Ther-Act    Heel Raises    Mini Squats    3 way hip    Sit <> stands  1 x                        AM-PAC 6 CLICK MOBILITY  How much help from another person does this patient currently need?   1 = Unable, Total/Dependent Assistance  2 = A lot, Maximum/Moderate Assistance  3 = A little, Minimum/Contact Guard/Supervision  4 = None, Modified [place]/Independent         AM-PAC Raw Score CMS G-Code Modifier Level of Impairment Assistance   6 % Total / Unable   7 - 9 CM 80 - 100% Maximal Assist   10 - 14 CL 60 - 80% Moderate Assist   15 - 19 CK 40 - 60% Moderate Assist   20 - 22 CJ 20 - 40% Minimal Assist   23 CI 1-20% SBA / CGA   24 CH 0% Independent/ Mod I     Patient left up in chair with call button in reach.    Assessment:  Verito Weston is a 89 y.o. female with a medical diagnosis of Closed fracture of " left proximal humerus and presents with muscle weakness, gait instability, decreased UE use, and impaired balance. Pt able to complete exercises with minimal rest breaks required. Patient with reports of L UE pain with movement and requiring PT support with sit <> stands. Gait performed with SPC instead of amrit-walker due to patient safety improved with SPC. Patient with mod fatigue post gait trial but no adverse effects.     Rehab identified problem list/impairments: Rehab identified problem list/impairments: weakness, impaired endurance, impaired self care skills, impaired functional mobility, impaired balance, decreased lower extremity function, decreased upper extremity function, decreased safety awareness    Rehab potential is good.    Activity tolerance: Fair    Discharge recommendations: Discharge Facility/Level of Care Needs: home with home health     Barriers to discharge:      Equipment recommendations: Equipment Needed After Discharge: 3-in-1 commode     GOALS:   Multidisciplinary Problems       Physical Therapy Goals          Problem: Physical Therapy    Goal Priority Disciplines Outcome Goal Variances Interventions   Physical Therapy Goal     PT, PT/OT      Description: Short Term Goals  1. Patient will complete 30 reps of B LE exercises with correct form.   2. Patient will complete sit<>stand transfers with SBA.  3. Patient will ambulate 100 feet with hemiwalker on level surfaces with CGA.     Long Term Goals   1. Patient will ambulate 300 feet with hemiwalker on level and unlevel surfaces with SBA.  2. Patient will complete all functional transfers with MOD I.   3. Patient will negotiate up and down 5 stairs with use of handrail with CGA                       PLAN:    Patient to be seen 5 x/week  to address the above listed problems via gait training, therapeutic activities, therapeutic exercises  Plan of Care expires: 11/18/22  Plan of Care reviewed with: patient     Nevin Ness, PT, DPT      11/01/2022

## 2022-11-01 NOTE — PT/OT/SLP PROGRESS
"Physical Therapy  Treatment    Verito Weston   MRN: [id number]   Admitting Diagnosis: Closed fracture of left proximal humerus    PT Received On: 11/01/22  PT Start Time: 0910     PT Stop Time: 0950    PT Total Time (min): 40 min       Billable Minutes:  Gait Training 10 and Therapeutic Exercise 30    Treatment Type: Treatment  PT/PTA: PTA     PTA Visit Number: 1       General Precautions: Standard, fall  Orthopedic Precautions: LUE non weight bearing   Braces: UE Sling  Respiratory Status: Nasal cannula, flow 2 L/min    Spiritual, Cultural Beliefs, Jain Practices, Values that Affect Care: no    Subjective:  Patient is agreeable to physical therapy treatment session, and does not report new complaints.     Pain/Comfort  Pain Rating 1: 4/10  Location - Side 1: Left  Location - Orientation 1: generalized  Location 1: shoulder    Objective:   Patient found with: oxygen    Functional Mobility:  Bed Mobility: not performed       Transfers: sit <> stand min A x 1 x 5x        Gait: 100' with SPC due to safety concern with hemiwalker       Stairs: Not performed    Therapeutic Activities and Exercises:    Ther- Ex    Nustep    Ankle pumps 30 x    Hip adduction 30 x 3"   Heelslides    LAQ's 30 x 1.5#   Hamstring Curls 3 0 x red tband   Quad sets  30 x 3"   SAQ's 30x 3"   Straight Leg Raises    Hip abduction 30 x red tband   Horizontal ADD/ABD 30 x each    Glut sets  30 x        Ther-Act    Heel Raises    Mini Squats    3 way hip    Sit <> stands  5 x                        AM-PAC 6 CLICK MOBILITY  How much help from another person does this patient currently need?   1 = Unable, Total/Dependent Assistance  2 = A lot, Maximum/Moderate Assistance  3 = A little, Minimum/Contact Guard/Supervision  4 = None, Modified [place]/Independent    Turning over in bed (including adjusting bedclothes, sheets and blankets)?: 3  Sitting down on and standing up from a chair with arms (e.g., wheelchair, bedside commode, etc.): " 3  Moving from lying on back to sitting on the side of the bed?: 3  Moving to and from a bed to a chair (including a wheelchair)?: 3  Need to walk in hospital room?: 3  Climbing 3-5 steps with a railing?: 2  Basic Mobility Total Score: 17    AM-PAC Raw Score CMS G-Code Modifier Level of Impairment Assistance   6 % Total / Unable   7 - 9 CM 80 - 100% Maximal Assist   10 - 14 CL 60 - 80% Moderate Assist   15 - 19 CK 40 - 60% Moderate Assist   20 - 22 CJ 20 - 40% Minimal Assist   23 CI 1-20% SBA / CGA   24 CH 0% Independent/ Mod I     Patient left up in chair with call button in reach.    Assessment:  Verito Weston is a 89 y.o. female with a medical diagnosis of Closed fracture of left proximal humerus and presents with muscle weakness, gait instability, decreased UE use, and impaired balance. Pt able to complete exercises with minimal rest breaks required. Patient with reports of L UE pain with movement and requiring LPTA assistance with sit <> stands.   Patient reports fatigue at end of treatment without adverse effects noted     Rehab identified problem list/impairments: Rehab identified problem list/impairments: weakness, impaired balance, impaired endurance, impaired functional mobility, impaired self care skills, gait instability    Rehab potential is good.    Activity tolerance: Fair    Discharge recommendations: Discharge Facility/Level of Care Needs: home with home health     Barriers to discharge:      Equipment recommendations: Equipment Needed After Discharge: 3-in-1 commode     GOALS:   Multidisciplinary Problems       Physical Therapy Goals          Problem: Physical Therapy    Goal Priority Disciplines Outcome Goal Variances Interventions   Physical Therapy Goal     PT, PT/OT Ongoing, Progressing     Description: Short Term Goals  1. Patient will complete 30 reps of B LE exercises with correct form.   2. Patient will complete sit<>stand transfers with SBA.  3. Patient will ambulate 100 feet  with hemiwalker on level surfaces with CGA.     Long Term Goals   1. Patient will ambulate 300 feet with hemiwalker on level and unlevel surfaces with SBA.  2. Patient will complete all functional transfers with MOD I.   3. Patient will negotiate up and down 5 stairs with use of handrail with CGA                       PLAN:    Patient to be seen 5 x/week  to address the above listed problems via gait training, therapeutic activities, therapeutic exercises  Plan of Care expires: 11/18/22  Plan of Care reviewed with: patient     Continue POC per PT order to progress patient toward rehab goals as tolerated by patient.  KATRINA Jolley 11/1/2022

## 2022-11-01 NOTE — PT/OT/SLP PROGRESS
"Occupational Therapy  Treatment    Verito Weston   MRN: [id number]   Admitting Diagnosis: Closed fracture of left proximal humerus    OT Date of Treatment: 11/01/22   OT Start Time: 0825  OT Stop Time: 0905  OT Total Time (min): 40 min    Billable Minutes:  Self Care/Home Management 8 min and Therapeutic Exercise 32 min               General Precautions: Standard, fall  Orthopedic Precautions: LUE non weight bearing  Braces: UE Sling  Respiratory Status: Nasal cannula, flow 2 L/min         Subjective:  Communicated with RN prior to session.    Pain/Comfort  Pain Rating 1: 4/10    Objective:  Patient found with: oxygen     Functional Mobility:  Bed Mobility:       Transfers:        Functional Ambulation:     Activities of Daily Living:   Pt was CGA with toilet t/f; Pt was min a with toileting    Therapeutic Activities and Exercises:  Patient completed the following for increased strength to increase I with ADLs: 1# DB RUE- shoulder press, chest press, bicep curls x 30, red theraband- tricep extension RUE x 30; green handgripper x 50 RUE      AM-PAC 6 CLICK ADL   How much help from another person does this patient currently need?   1 = Unable, Total/Dependent Assistance  2 = A lot, Maximum/Moderate Assistance  3 = A little, Minimum/Contact Guard/Supervision  4 = None, Modified [place]/Independent    Putting on and taking off regular lower body clothing? : 2  Bathing (including washing, rinsing, drying)?: 2  Toileting, which includes using toilet, bedpan, or urinal? : 3  Putting on and taking off regular upper body clothing?: 2  Taking care of personal grooming such as brushing teeth?: 3  Eating meals?: 4  Daily Activity Total Score: 16     AM-PAC Raw Score CMS "G-Code Modifier Level of Impairment Assistance   6 % Total / Unable   7 - 8 CM 80 - 100% Maximal Assist   9-13 CL 60 - 80% Moderate Assist   14 - 19 CK 40 - 60% Moderate Assist   20 - 22 CJ 20 - 40% Minimal Assist   23 CI 1-20% SBA / CGA   24 CH 0% " Independent/ Mod I       Patient left up in chair with  PT notified    ASSESSMENT:  Verito Weston is a 89 y.o. female with a medical diagnosis of Closed fracture of left proximal humerus and presents with decreased strength, decreased endurance, decreased self-care.    Rehab identified problem list/impairments: Rehab identified problem list/impairments: weakness, impaired endurance, impaired self care skills, impaired functional mobility, impaired balance, decreased lower extremity function, decreased upper extremity function, decreased safety awareness, pain    Rehab potential is good.    Activity tolerance: Good    Discharge recommendations: Discharge Facility/Level of Care Needs: home with home health     Barriers to discharge:      Equipment recommendations: 3-in-1 commode     GOALS:   Multidisciplinary Problems       Occupational Therapy Goals          Problem: Occupational Therapy    Goal Priority Disciplines Outcome Interventions   Occupational Therapy Goal     OT, PT/OT     Description: Description: Grooming Status:   Short Term Goal: Pt will perform grooming with min a sitting EOB.   Long Term Goal: Pt will perform grooming/oral hygiene standing at sink with s/u      LE dressing Status:   Short Term Goal: Pt will perform LE dressing with min a.   Long Term Goal: Pt will perform LE dressing with s/u.    Toileting Status:   Short Term Goal: Pt will perform toilet hygiene on BSC with min a.  Long Term Goal: Pt will perform toilet hygiene on toilet with no AE with s/u.    Commode Transfer:   Short Term Goal: Pt will perform BSC t/f with s/u.  Long Term Goal:  Pt will perform toilet t/f in bathroom with mod I.     Bathing Status:   Long Term Goal: Pt will perform sponge bath with s/u with no unsafe fatigue.     Strength Status:   Long Term Goal: Pt to perform BUE strengthening with weights and/or body weight to increase ADL independence and safety    Endurance Status:   Short Term Goal:pt to perform 15 min OT  treatment with 5 or greater rest breaks  Long Term Goal: pt to perform 30 min OT treat with 3 or less rest breaks                       Plan:  Patient to be seen 5 x/week to address the above listed problems via therapeutic exercises, self-care/home management  Plan of Care expires:    Plan of Care reviewed with: patient       Leann Montejozie, OTR/L    11/01/2022

## 2022-11-01 NOTE — PLAN OF CARE
Problem: Physical Therapy  Goal: Physical Therapy Goal  Description: Short Term Goals  1. Patient will complete 30 reps of B LE exercises with correct form.   2. Patient will complete sit<>stand transfers with SBA.  3. Patient will ambulate 100 feet with hemiwalker on level surfaces with CGA.     Long Term Goals   1. Patient will ambulate 300 feet with hemiwalker on level and unlevel surfaces with SBA.  2. Patient will complete all functional transfers with MOD I.   3. Patient will negotiate up and down 5 stairs with use of handrail with CGA  Outcome: Ongoing, Progressing   Continue POC per PT order to progress patient toward rehab goals.  KATRINA Jolley 11/1/2022

## 2022-11-02 PROCEDURE — 94761 N-INVAS EAR/PLS OXIMETRY MLT: CPT

## 2022-11-02 PROCEDURE — 99900035 HC TECH TIME PER 15 MIN (STAT)

## 2022-11-02 PROCEDURE — 25000003 PHARM REV CODE 250: Performed by: FAMILY MEDICINE

## 2022-11-02 PROCEDURE — 97110 THERAPEUTIC EXERCISES: CPT

## 2022-11-02 PROCEDURE — 94640 AIRWAY INHALATION TREATMENT: CPT

## 2022-11-02 PROCEDURE — 27000221 HC OXYGEN, UP TO 24 HOURS

## 2022-11-02 PROCEDURE — 97116 GAIT TRAINING THERAPY: CPT

## 2022-11-02 PROCEDURE — 25000003 PHARM REV CODE 250: Performed by: SPECIALIST

## 2022-11-02 PROCEDURE — 11000004 HC SNF PRIVATE

## 2022-11-02 PROCEDURE — 25000242 PHARM REV CODE 250 ALT 637 W/ HCPCS: Performed by: FAMILY MEDICINE

## 2022-11-02 RX ADMIN — OXYCODONE HYDROCHLORIDE AND ACETAMINOPHEN 1 TABLET: 10; 325 TABLET ORAL at 08:11

## 2022-11-02 RX ADMIN — FLUTICASONE FUROATE AND VILANTEROL TRIFENATATE 1 PUFF: 200; 25 POWDER RESPIRATORY (INHALATION) at 08:11

## 2022-11-02 RX ADMIN — SULFAMETHOXAZOLE AND TRIMETHOPRIM 1 TABLET: 400; 80 TABLET ORAL at 08:11

## 2022-11-02 RX ADMIN — PAROXETINE 20 MG: 10 TABLET, FILM COATED ORAL at 08:11

## 2022-11-02 RX ADMIN — ATORVASTATIN CALCIUM 10 MG: 10 TABLET, FILM COATED ORAL at 08:11

## 2022-11-02 RX ADMIN — HYDROCHLOROTHIAZIDE 12.5 MG: 12.5 TABLET ORAL at 08:11

## 2022-11-02 RX ADMIN — WARFARIN SODIUM 5 MG: 5 TABLET ORAL at 04:11

## 2022-11-02 RX ADMIN — SENNOSIDES AND DOCUSATE SODIUM 1 TABLET: 50; 8.6 TABLET ORAL at 08:11

## 2022-11-02 RX ADMIN — OXYCODONE HYDROCHLORIDE AND ACETAMINOPHEN 1 TABLET: 10; 325 TABLET ORAL at 01:11

## 2022-11-02 RX ADMIN — LOSARTAN POTASSIUM 25 MG: 25 TABLET, FILM COATED ORAL at 08:11

## 2022-11-02 RX ADMIN — IPRATROPIUM BROMIDE AND ALBUTEROL SULFATE 3 ML: 2.5; .5 SOLUTION RESPIRATORY (INHALATION) at 07:11

## 2022-11-02 NOTE — PLAN OF CARE
Problem: Fall Injury Risk  Goal: Absence of Fall and Fall-Related Injury  Outcome: Ongoing, Progressing  Intervention: Identify and Manage Contributors  Flowsheets (Taken 11/2/2022 1836)  Self-Care Promotion: independence encouraged  Medication Review/Management: medications reviewed  Intervention: Promote Injury-Free Environment  Flowsheets (Taken 11/2/2022 1836)  Safety Promotion/Fall Prevention: assistive device/personal item within reach     Problem: Skin Injury Risk Increased  Goal: Skin Health and Integrity  Outcome: Ongoing, Progressing  Intervention: Optimize Skin Protection  Flowsheets (Taken 11/2/2022 1836)  Pressure Reduction Techniques: frequent weight shift encouraged  Pressure Reduction Devices: feet on footrest/footstool  Head of Bed (HOB) Positioning: HOB elevated  Intervention: Promote and Optimize Oral Intake  Flowsheets (Taken 11/2/2022 1836)  Oral Nutrition Promotion: calorie-dense foods provided

## 2022-11-02 NOTE — PLAN OF CARE
Problem: Adult Inpatient Plan of Care  Goal: Optimal Comfort and Wellbeing  Outcome: Ongoing, Progressing  Goal: Readiness for Transition of Care  Outcome: Ongoing, Progressing     Problem: Fall Injury Risk  Goal: Absence of Fall and Fall-Related Injury  Outcome: Ongoing, Progressing     Problem: Skin Injury Risk Increased  Goal: Skin Health and Integrity  Outcome: Ongoing, Progressing  Intervention: Optimize Skin Protection  Flowsheets (Taken 11/2/2022 0257)  Pressure Reduction Techniques: frequent weight shift encouraged  Skin Protection:   adhesive use limited   incontinence pads utilized

## 2022-11-02 NOTE — PT/OT/SLP PROGRESS
"Physical Therapy  Treatment    Verito Weston   MRN: [id number]   Admitting Diagnosis: Closed fracture of left proximal humerus    PT Received On: 11/02/22  PT Start Time: 1331     PT Stop Time: 1413    PT Total Time (min): 42 min       Billable Minutes:  Gait Training 10 and Therapeutic Exercise 32    Treatment Type: Treatment  PT/PTA: PT     PTA Visit Number: 0       General Precautions: Standard, fall  Orthopedic Precautions: LUE non weight bearing   Braces: UE Sling  Respiratory Status: Nasal cannula, flow 2 L/min    Spiritual, Cultural Beliefs, Worship Practices, Values that Affect Care: no    Subjective:  Patient is agreeable to physical therapy treatment session and does not report new complaints.     Pain/Comfort  Pain Rating 1: 4/10  Location - Side 1: Left  Location 1: shoulder    Objective:   Patient found with: oxygen    Functional Mobility:  Bed Mobility: not performed       Transfers: sit <> stand min A x 1        Gait: 100' with SPC with CGA, near constant cuing to slow down       Stairs: Not performed    Therapeutic Activities and Exercises:    Ther- Ex    Nustep    Ankle pumps 30 x    Hip adduction 30 x 3"   Heelslides    LAQ's 30 x 1.5#   Hamstring Curls 3 0 x red tband   Quad sets  30 x 3"   SAQ's 30x 3"   Straight Leg Raises    Hip abduction 30 x red tband   Horizontal ADD/ABD 30 x each    Glut sets  30 x        Ther-Act    Heel Raises    Mini Squats    3 way hip    Sit <> stands  1 x                      AM-PAC 6 CLICK MOBILITY  How much help from another person does this patient currently need?   1 = Unable, Total/Dependent Assistance  2 = A lot, Maximum/Moderate Assistance  3 = A little, Minimum/Contact Guard/Supervision  4 = None, Modified [place]/Independent    Turning over in bed (including adjusting bedclothes, sheets and blankets)?: 3  Sitting down on and standing up from a chair with arms (e.g., wheelchair, bedside commode, etc.): 3  Moving from lying on back to sitting on the side " of the bed?: 3  Moving to and from a bed to a chair (including a wheelchair)?: 3  Need to walk in hospital room?: 3  Climbing 3-5 steps with a railing?: 2  Basic Mobility Total Score: 17    AM-PAC Raw Score CMS G-Code Modifier Level of Impairment Assistance   6 % Total / Unable   7 - 9 CM 80 - 100% Maximal Assist   10 - 14 CL 60 - 80% Moderate Assist   15 - 19 CK 40 - 60% Moderate Assist   20 - 22 CJ 20 - 40% Minimal Assist   23 CI 1-20% SBA / CGA   24 CH 0% Independent/ Mod I     Patient left up in chair with call button in reach.    Assessment:  Verito Weston is a 89 y.o. female with a medical diagnosis of Closed fracture of left proximal humerus and presents with muscle weakness, gait instability, decreased UE use, and impaired balance. Pt able to complete exercises with minimal rest breaks required. Patient with reports of L UE pain with movement and requiring PT assistance with sit <> stands. Patient with easy fatigue with gait and unsteady due to impulsive movements. Patient reports fatigue at end of treatment without adverse effects noted     Rehab identified problem list/impairments: Rehab identified problem list/impairments: weakness, impaired balance, impaired endurance, impaired functional mobility, impaired self care skills, gait instability    Rehab potential is good.    Activity tolerance: Fair    Discharge recommendations: Discharge Facility/Level of Care Needs: home with home health     Barriers to discharge:      Equipment recommendations: Equipment Needed After Discharge: 3-in-1 commode     GOALS:   Multidisciplinary Problems       Physical Therapy Goals          Problem: Physical Therapy    Goal Priority Disciplines Outcome Goal Variances Interventions   Physical Therapy Goal     PT, PT/OT Ongoing, Progressing     Description: Short Term Goals  1. Patient will complete 30 reps of B LE exercises with correct form.   2. Patient will complete sit<>stand transfers with SBA.  3. Patient will  ambulate 100 feet with hemiwalker on level surfaces with CGA.     Long Term Goals   1. Patient will ambulate 300 feet with hemiwalker on level and unlevel surfaces with SBA.  2. Patient will complete all functional transfers with MOD I.   3. Patient will negotiate up and down 5 stairs with use of handrail with CGA                       PLAN:    Patient to be seen 5 x/week  to address the above listed problems via gait training, therapeutic activities, therapeutic exercises  Plan of Care expires: 11/18/22  Plan of Care reviewed with: patient     Continue POC per PT order to progress patient toward rehab goals as tolerated by patient.      Nevin Ness, PT, DPT     11/2/2022

## 2022-11-02 NOTE — NURSING
Sitting up in chair, Denies any needs or concerns. CB in easy reach. Encouraged pt to call for assistance as needed.

## 2022-11-02 NOTE — PLAN OF CARE
[facility]  Patient: Verito Weston     Interdisciplinary Team Meeting     Today's Date: 11/2/2022     Estimated D/C Date: 11/17/22       Physician: Dr. Hyman    Pharmacy: [facility]    : Aviva Bear RN Physical/Occupational Therapy: SUDARSHAN Gomes, PT, and ODILON Rowley OT   Speech Therapy: na Activity Therapy: reading the newspaper,watching tv, talking on her phone   Nursing: Kaity Mccabe, RN, Priscilla Trinidad, RN, Phil Cueto,RN, Nubia Schofield LPN, Nicholas Romero,Monitor tech, Rosalinda Christie,CNA      Nurse  New Symptoms/Problems: no  Last BM: 11/01/22 Urine: continent Diarrhea: No   Constipated: No Bladder: continent    Isolation: No     O2: no O2SatW/O2:  % Rm Air: 95 %   Nutrition: good  Speech/Swallowing: no difficulties  Aspiration Precautions: No  Cognition: alert/oriented x3    Physical Therapy  Physical Therapy/Gait: 100 feet ELOS: 21 days   Transfers: cga Range of Motion/Restrictions: LUE limited     Occupational Therapy  Occupational Therapy:  Eating/Grooming: needs set uo   Toileting: cga asst Bathing: mod asst   Dressing (Upper Body): mod asst Dressing (Lower Body): mod asst       Tx Plan/Recommendations reviewed with patient at bedside.  Additional family Conference/Training: no  D/C Plan/Recommendations: home with home health    Pharmacy  Medication Changes (see MD orders in chart): No  MD/NP: Dr. Hyman Labs Reviewed: yes New Lab Orders: no     MD/NP Signature: Time:

## 2022-11-02 NOTE — PT/OT/SLP PROGRESS
"Occupational Therapy  Treatment    Verito Weston   MRN: [id number]   Admitting Diagnosis: Closed fracture of left proximal humerus    OT Date of Treatment: 11/02/22   OT Start Time: 1006  OT Stop Time: 1034  OT Total Time (min): 28 min    Billable Minutes:  Therapeutic Exercise 28 min               General Precautions: Standard, fall  Orthopedic Precautions: LUE non weight bearing  Braces: UE Sling  Respiratory Status: Nasal cannula, flow 2 L/min         Subjective:  Communicated with RN prior to session.    Pain/Comfort  Pain Rating 1: 4/10    Objective:  Patient found with: oxygen     Functional Mobility:  Bed Mobility:       Transfers:        Functional Ambulation:     Activities of Daily Living:       Therapeutic Activities and Exercises:  Patient completed the following for increased strength to increase I with ADLs: 1# DB RUE- shoulder press, chest press, bicep curls x 30, red theraband- tricep extension RUE x 30; green handgripper x 50 RUE      AM-PAC 6 CLICK ADL   How much help from another person does this patient currently need?   1 = Unable, Total/Dependent Assistance  2 = A lot, Maximum/Moderate Assistance  3 = A little, Minimum/Contact Guard/Supervision  4 = None, Modified [place]/Independent    Putting on and taking off regular lower body clothing? : 2  Bathing (including washing, rinsing, drying)?: 2  Toileting, which includes using toilet, bedpan, or urinal? : 3  Putting on and taking off regular upper body clothing?: 2  Taking care of personal grooming such as brushing teeth?: 3  Eating meals?: 4  Daily Activity Total Score: 16     AM-PAC Raw Score CMS "G-Code Modifier Level of Impairment Assistance   6 % Total / Unable   7 - 8 CM 80 - 100% Maximal Assist   9-13 CL 60 - 80% Moderate Assist   14 - 19 CK 40 - 60% Moderate Assist   20 - 22 CJ 20 - 40% Minimal Assist   23 CI 1-20% SBA / CGA   24 CH 0% Independent/ Mod I       Patient left up in chair with call button in " reach    ASSESSMENT:  Verito Weston is a 89 y.o. female with a medical diagnosis of Closed fracture of left proximal humerus and presents with decreased strength, decreased endurance, decreased self-care.    Rehab identified problem list/impairments: Rehab identified problem list/impairments: weakness, impaired endurance, impaired self care skills, impaired functional mobility, impaired balance, decreased lower extremity function, decreased upper extremity function, decreased safety awareness, pain    Rehab potential is good.    Activity tolerance: Good    Discharge recommendations: Discharge Facility/Level of Care Needs: home with home health     Barriers to discharge:      Equipment recommendations: 3-in-1 commode     GOALS:   Multidisciplinary Problems       Occupational Therapy Goals          Problem: Occupational Therapy    Goal Priority Disciplines Outcome Interventions   Occupational Therapy Goal     OT, PT/OT     Description: Description: Grooming Status:   Short Term Goal: Pt will perform grooming with min a sitting EOB.   Long Term Goal: Pt will perform grooming/oral hygiene standing at sink with s/u      LE dressing Status:   Short Term Goal: Pt will perform LE dressing with min a.   Long Term Goal: Pt will perform LE dressing with s/u.    Toileting Status:   Short Term Goal: Pt will perform toilet hygiene on BSC with min a.  Long Term Goal: Pt will perform toilet hygiene on toilet with no AE with s/u.    Commode Transfer:   Short Term Goal: Pt will perform BSC t/f with s/u.  Long Term Goal:  Pt will perform toilet t/f in bathroom with mod I.     Bathing Status:   Long Term Goal: Pt will perform sponge bath with s/u with no unsafe fatigue.     Strength Status:   Long Term Goal: Pt to perform BUE strengthening with weights and/or body weight to increase ADL independence and safety    Endurance Status:   Short Term Goal:pt to perform 15 min OT treatment with 5 or greater rest breaks  Long Term Goal: pt to  perform 30 min OT treat with 3 or less rest breaks                       Plan:  Patient to be seen 5 x/week to address the above listed problems via therapeutic exercises  Plan of Care expires:    Plan of Care reviewed with: patient       Leann Amrik, OTR/L    11/02/2022

## 2022-11-03 PROBLEM — R42 DIZZINESS: Status: ACTIVE | Noted: 2022-11-03

## 2022-11-03 PROBLEM — S42.302D AFTERCARE FOR HEALING TRAUMATIC FRACTURE OF HUMERUS, LEFT: Status: ACTIVE | Noted: 2022-11-03

## 2022-11-03 LAB
ANION GAP SERPL CALCULATED.3IONS-SCNC: 9 MMOL/L (ref 7–16)
BASOPHILS # BLD AUTO: 0.05 K/UL (ref 0–0.2)
BASOPHILS NFR BLD AUTO: 0.7 % (ref 0–1)
BUN SERPL-MCNC: 11 MG/DL (ref 7–18)
BUN/CREAT SERPL: 16 (ref 6–20)
CALCIUM SERPL-MCNC: 8.8 MG/DL (ref 8.5–10.1)
CHLORIDE SERPL-SCNC: 102 MMOL/L (ref 98–107)
CO2 SERPL-SCNC: 33 MMOL/L (ref 21–32)
CREAT SERPL-MCNC: 0.67 MG/DL (ref 0.55–1.02)
DIFFERENTIAL METHOD BLD: ABNORMAL
EGFR (NO RACE VARIABLE) (RUSH/TITUS): 83 ML/MIN/1.73M²
EOSINOPHIL # BLD AUTO: 0.53 K/UL (ref 0–0.5)
EOSINOPHIL NFR BLD AUTO: 6.9 % (ref 1–4)
ERYTHROCYTE [DISTWIDTH] IN BLOOD BY AUTOMATED COUNT: 14.4 % (ref 11.5–14.5)
GLUCOSE SERPL-MCNC: 112 MG/DL (ref 74–106)
HCT VFR BLD AUTO: 37.8 % (ref 38–47)
HGB BLD-MCNC: 11.7 G/DL (ref 12–16)
IMM GRANULOCYTES # BLD AUTO: 0.02 K/UL (ref 0–0.04)
IMM GRANULOCYTES NFR BLD: 0.3 % (ref 0–0.4)
INR BLD: 1.26
LYMPHOCYTES # BLD AUTO: 1.92 K/UL (ref 1–4.8)
LYMPHOCYTES NFR BLD AUTO: 25.1 % (ref 27–41)
MCH RBC QN AUTO: 29.3 PG (ref 27–31)
MCHC RBC AUTO-ENTMCNC: 31 G/DL (ref 32–36)
MCV RBC AUTO: 94.7 FL (ref 80–96)
MONOCYTES # BLD AUTO: 0.95 K/UL (ref 0–0.8)
MONOCYTES NFR BLD AUTO: 12.4 % (ref 2–6)
MPC BLD CALC-MCNC: 10 FL (ref 9.4–12.4)
NEUTROPHILS # BLD AUTO: 4.18 K/UL (ref 1.8–7.7)
NEUTROPHILS NFR BLD AUTO: 54.6 % (ref 53–65)
PLATELET # BLD AUTO: 230 K/UL (ref 150–400)
POTASSIUM SERPL-SCNC: 3.8 MMOL/L (ref 3.5–5.1)
PROTHROMBIN TIME: 15.8 SECONDS (ref 11.7–14.7)
RBC # BLD AUTO: 3.99 M/UL (ref 4.2–5.4)
SODIUM SERPL-SCNC: 140 MMOL/L (ref 136–145)
WBC # BLD AUTO: 7.65 K/UL (ref 4.5–11)

## 2022-11-03 PROCEDURE — 85610 PROTHROMBIN TIME: CPT | Performed by: FAMILY MEDICINE

## 2022-11-03 PROCEDURE — 27000221 HC OXYGEN, UP TO 24 HOURS

## 2022-11-03 PROCEDURE — 25000003 PHARM REV CODE 250: Performed by: FAMILY MEDICINE

## 2022-11-03 PROCEDURE — 25000242 PHARM REV CODE 250 ALT 637 W/ HCPCS: Performed by: FAMILY MEDICINE

## 2022-11-03 PROCEDURE — 99307 PR NURSING FAC CARE, SUBSEQ, IMPROVING: ICD-10-PCS | Mod: ,,, | Performed by: INTERNAL MEDICINE

## 2022-11-03 PROCEDURE — 97110 THERAPEUTIC EXERCISES: CPT

## 2022-11-03 PROCEDURE — 97110 THERAPEUTIC EXERCISES: CPT | Mod: CQ

## 2022-11-03 PROCEDURE — 99900035 HC TECH TIME PER 15 MIN (STAT)

## 2022-11-03 PROCEDURE — 94761 N-INVAS EAR/PLS OXIMETRY MLT: CPT

## 2022-11-03 PROCEDURE — 97116 GAIT TRAINING THERAPY: CPT | Mod: CQ

## 2022-11-03 PROCEDURE — 85025 COMPLETE CBC W/AUTO DIFF WBC: CPT | Performed by: FAMILY MEDICINE

## 2022-11-03 PROCEDURE — 80048 BASIC METABOLIC PNL TOTAL CA: CPT | Performed by: FAMILY MEDICINE

## 2022-11-03 PROCEDURE — 99307 SBSQ NF CARE SF MDM 10: CPT | Mod: ,,, | Performed by: INTERNAL MEDICINE

## 2022-11-03 PROCEDURE — 94640 AIRWAY INHALATION TREATMENT: CPT

## 2022-11-03 PROCEDURE — 11000004 HC SNF PRIVATE

## 2022-11-03 PROCEDURE — 63600175 PHARM REV CODE 636 W HCPCS: Performed by: INTERNAL MEDICINE

## 2022-11-03 PROCEDURE — 25000003 PHARM REV CODE 250: Performed by: SPECIALIST

## 2022-11-03 PROCEDURE — 36415 COLL VENOUS BLD VENIPUNCTURE: CPT | Performed by: FAMILY MEDICINE

## 2022-11-03 RX ORDER — ENOXAPARIN SODIUM 100 MG/ML
1 INJECTION SUBCUTANEOUS
Status: DISCONTINUED | OUTPATIENT
Start: 2022-11-03 | End: 2022-11-17 | Stop reason: HOSPADM

## 2022-11-03 RX ADMIN — IPRATROPIUM BROMIDE AND ALBUTEROL SULFATE 3 ML: 2.5; .5 SOLUTION RESPIRATORY (INHALATION) at 07:11

## 2022-11-03 RX ADMIN — SULFAMETHOXAZOLE AND TRIMETHOPRIM 1 TABLET: 400; 80 TABLET ORAL at 08:11

## 2022-11-03 RX ADMIN — SENNOSIDES AND DOCUSATE SODIUM 1 TABLET: 50; 8.6 TABLET ORAL at 08:11

## 2022-11-03 RX ADMIN — OXYCODONE HYDROCHLORIDE AND ACETAMINOPHEN 1 TABLET: 10; 325 TABLET ORAL at 10:11

## 2022-11-03 RX ADMIN — HYDROCHLOROTHIAZIDE 12.5 MG: 12.5 TABLET ORAL at 09:11

## 2022-11-03 RX ADMIN — ATORVASTATIN CALCIUM 10 MG: 10 TABLET, FILM COATED ORAL at 08:11

## 2022-11-03 RX ADMIN — ENOXAPARIN SODIUM 90 MG: 100 INJECTION SUBCUTANEOUS at 10:11

## 2022-11-03 RX ADMIN — WARFARIN SODIUM 2.5 MG: 2.5 TABLET ORAL at 04:11

## 2022-11-03 RX ADMIN — LOSARTAN POTASSIUM 25 MG: 25 TABLET, FILM COATED ORAL at 08:11

## 2022-11-03 RX ADMIN — OXYCODONE HYDROCHLORIDE AND ACETAMINOPHEN 1 TABLET: 10; 325 TABLET ORAL at 05:11

## 2022-11-03 RX ADMIN — FLUTICASONE FUROATE AND VILANTEROL TRIFENATATE 1 PUFF: 200; 25 POWDER RESPIRATORY (INHALATION) at 08:11

## 2022-11-03 RX ADMIN — OXYCODONE HYDROCHLORIDE AND ACETAMINOPHEN 1 TABLET: 10; 325 TABLET ORAL at 12:11

## 2022-11-03 RX ADMIN — ENOXAPARIN SODIUM 90 MG: 100 INJECTION SUBCUTANEOUS at 11:11

## 2022-11-03 RX ADMIN — PAROXETINE 20 MG: 10 TABLET, FILM COATED ORAL at 08:11

## 2022-11-03 NOTE — PT/OT/SLP PROGRESS
"Occupational Therapy  Treatment    Verito Weston   MRN: [id number]   Admitting Diagnosis: Closed fracture of left proximal humerus    OT Date of Treatment: 11/03/22   OT Start Time: 0848  OT Stop Time: 0919  OT Total Time (min): 31 min    Billable Minutes:  Therapeutic Exercise 31 min               General Precautions: Standard, fall  Orthopedic Precautions: LUE non weight bearing  Braces: UE Sling  Respiratory Status: Nasal cannula, flow 2 L/min         Subjective:  Communicated with RN prior to session.    Pain/Comfort  Pain Rating 1: 0/10    Objective:  Patient found with: oxygen     Functional Mobility:  Bed Mobility:       Transfers:        Functional Ambulation:     Activities of Daily Living:       Therapeutic Activities and Exercises:  Patient completed the following for increased strength to increase I with ADLs: 1# DB RUE- shoulder press, chest press, bicep curls x 30, red theraband- tricep extension RUE x 30; green handgripper x 50 RUE; PROM to left elbow flex/ext. Also, patients sling was on in an ineffective way and OT readjusted it.      AM-PAC 6 CLICK ADL   How much help from another person does this patient currently need?   1 = Unable, Total/Dependent Assistance  2 = A lot, Maximum/Moderate Assistance  3 = A little, Minimum/Contact Guard/Supervision  4 = None, Modified [place]/Independent    Putting on and taking off regular lower body clothing? : 2  Bathing (including washing, rinsing, drying)?: 2  Toileting, which includes using toilet, bedpan, or urinal? : 3  Putting on and taking off regular upper body clothing?: 2  Taking care of personal grooming such as brushing teeth?: 3  Eating meals?: 4  Daily Activity Total Score: 16     AM-PAC Raw Score CMS "G-Code Modifier Level of Impairment Assistance   6 % Total / Unable   7 - 8 CM 80 - 100% Maximal Assist   9-13 CL 60 - 80% Moderate Assist   14 - 19 CK 40 - 60% Moderate Assist   20 - 22 CJ 20 - 40% Minimal Assist   23 CI 1-20% SBA / CGA "   24 CH 0% Independent/ Mod I       Patient left up in chair with  PT notified    ASSESSMENT:  Verito Weston is a 89 y.o. female with a medical diagnosis of Closed fracture of left proximal humerus and presents with decreased strength, decreased endurance, decreased self-care.    Rehab identified problem list/impairments: Rehab identified problem list/impairments: weakness, impaired endurance, impaired self care skills, impaired functional mobility, impaired balance, decreased upper extremity function, decreased lower extremity function, decreased safety awareness, pain    Rehab potential is good.    Activity tolerance: Good    Discharge recommendations: Discharge Facility/Level of Care Needs: home with home health     Barriers to discharge:      Equipment recommendations: 3-in-1 commode, cane, quad     GOALS:   Multidisciplinary Problems       Occupational Therapy Goals          Problem: Occupational Therapy    Goal Priority Disciplines Outcome Interventions   Occupational Therapy Goal     OT, PT/OT     Description: Description: Grooming Status:   Short Term Goal: Pt will perform grooming with min a sitting EOB.   Long Term Goal: Pt will perform grooming/oral hygiene standing at sink with s/u      LE dressing Status:   Short Term Goal: Pt will perform LE dressing with min a.   Long Term Goal: Pt will perform LE dressing with s/u.    Toileting Status:   Short Term Goal: Pt will perform toilet hygiene on BSC with min a.  Long Term Goal: Pt will perform toilet hygiene on toilet with no AE with s/u.    Commode Transfer:   Short Term Goal: Pt will perform BSC t/f with s/u.  Long Term Goal:  Pt will perform toilet t/f in bathroom with mod I.     Bathing Status:   Long Term Goal: Pt will perform sponge bath with s/u with no unsafe fatigue.     Strength Status:   Long Term Goal: Pt to perform BUE strengthening with weights and/or body weight to increase ADL independence and safety    Endurance Status:   Short Term  Goal:pt to perform 15 min OT treatment with 5 or greater rest breaks  Long Term Goal: pt to perform 30 min OT treat with 3 or less rest breaks                       Plan:  Patient to be seen 5 x/week to address the above listed problems via therapeutic exercises  Plan of Care expires:    Plan of Care reviewed with: patient       Leann Rowley, OTR/L    11/03/2022

## 2022-11-03 NOTE — PROGRESS NOTES
Patient is 89-year-old white female that is admitted with left humerus fracture and physical the rehab.    Hospital course:  The patient said that she got up to use bathroom yesterday became dizzy and fell back on the commode.  According to the family in the patient she did not hit the floor or hit her head or any neurologic deficits.  There was no seizure activity, incontinence urine or bowel, chest pain shortness of breath.  Patient states she just felt lightheaded but that has resolved.  She denies any other injuries or complaints.    Physical exam:  She is alert orient she is in no acute distress sitting on the chair     HEENT:  No acute findings     Neck:  Supple no JVD bruits     Lungs:  Clear no wheeze rhonchi or bronchospasm     Heart:  Normal sinus rhythm that murmur gallop rub     Neurology: No focal neurologic deficits     Musculoskeletal:  Left arm in the sling with some swelling but can move her fingers    Assessment:  Left humeral fracture, dizzy episode etiology undetermined     Plan:  Check orthostatics 2.  Monitor of any dizziness persistent changes, and continue physical therapy

## 2022-11-03 NOTE — PT/OT/SLP PROGRESS
"Physical Therapy  Treatment    Verito Weston   MRN: [id number]   Admitting Diagnosis: Closed fracture of left proximal humerus    PT Received On: 11/03/22  PT Start Time: 0925     PT Stop Time: 1000    PT Total Time (min): 35 min       Billable Minutes:  Gait Training 10 and Therapeutic Exercise 25    Treatment Type: Treatment  PT/PTA: PTA     PTA Visit Number: 1       General Precautions: Standard, fall  Orthopedic Precautions: LUE non weight bearing   Braces: UE Sling  Respiratory Status: Nasal cannula, flow 2 L/min    Spiritual, Cultural Beliefs, Shinto Practices, Values that Affect Care: no    Subjective:  Pt reports with no complaints and is agreeable to PT tx.     Pain/Comfort  Pain Rating 1: 0/10    Objective:   Patient found with: oxygen    Functional Mobility:  Bed Mobility: not performed       Transfers: sit <> stand min A x 1        Gait: 100' with Quad Cane with CGA, constant cueing to slow down       Stairs: Not performed    Therapeutic Activities and Exercises:    Ther- Ex    Nustep    Ankle pumps 30 x    Hip adduction 30 x 3"   Heelslides    LAQ's 30 x 1.5#   Hamstring Curls 30 x red tband   Quad sets  30 x 3"   SAQ's 30x 3"   Straight Leg Raises    Hip abduction 30 x red tband   Horizontal ADD/ABD 30 x each    Glut sets  30 x        Ther-Act    Heel Raises    Mini Squats    3 way hip    Sit <> stands  1 x                      AM-PAC 6 CLICK MOBILITY  How much help from another person does this patient currently need?   1 = Unable, Total/Dependent Assistance  2 = A lot, Maximum/Moderate Assistance  3 = A little, Minimum/Contact Guard/Supervision  4 = None, Modified [place]/Independent    Turning over in bed (including adjusting bedclothes, sheets and blankets)?: 3  Sitting down on and standing up from a chair with arms (e.g., wheelchair, bedside commode, etc.): 3  Moving from lying on back to sitting on the side of the bed?: 3  Moving to and from a bed to a chair (including a wheelchair)?: " 3  Need to walk in hospital room?: 3  Climbing 3-5 steps with a railing?: 2  Basic Mobility Total Score: 17    AM-PAC Raw Score CMS G-Code Modifier Level of Impairment Assistance   6 % Total / Unable   7 - 9 CM 80 - 100% Maximal Assist   10 - 14 CL 60 - 80% Moderate Assist   15 - 19 CK 40 - 60% Moderate Assist   20 - 22 CJ 20 - 40% Minimal Assist   23 CI 1-20% SBA / CGA   24 CH 0% Independent/ Mod I     Patient left up in chair with call button in reach.    Assessment:  Verito Weston is a 89 y.o. female with a medical diagnosis of Closed fracture of left proximal humerus and presents with muscle weakness, gait instability, decreased UE use, and impaired balance. Pt able to complete exercises with minimal rest breaks required. Pt required mod cueing to progress through tx session with proper speed, form, count, and gait. Pt displayed increased signs of fatigue during gait. Pt required increase verbal cues during ambulation to decrease speed of movement and ambulate with all four points of cane on the ground to increase safety.   Rehab identified problem list/impairments: Rehab identified problem list/impairments: weakness, impaired balance, impaired endurance, impaired functional mobility, impaired self care skills, gait instability    Rehab potential is good.    Activity tolerance: Fair    Discharge recommendations: Discharge Facility/Level of Care Needs: home with home health     Barriers to discharge:      Equipment recommendations: Equipment Needed After Discharge: 3-in-1 commode     GOALS:   Multidisciplinary Problems       Physical Therapy Goals          Problem: Physical Therapy    Goal Priority Disciplines Outcome Goal Variances Interventions   Physical Therapy Goal     PT, PT/OT Ongoing, Progressing     Description: Short Term Goals  1. Patient will complete 30 reps of B LE exercises with correct form.   2. Patient will complete sit<>stand transfers with SBA.  3. Patient will ambulate 100 feet with  hemiwalker on level surfaces with CGA.     Long Term Goals   1. Patient will ambulate 300 feet with hemiwalker on level and unlevel surfaces with SBA.  2. Patient will complete all functional transfers with MOD I.   3. Patient will negotiate up and down 5 stairs with use of handrail with CGA                       PLAN:    Patient to be seen 5 x/week  to address the above listed problems via gait training, therapeutic activities, therapeutic exercises  Plan of Care expires: 11/18/22  Plan of Care reviewed with: patient     Continue POC per PT order to progress patient toward rehab goals as tolerated by patient.    KATRINA Gerardo   11/3/2022

## 2022-11-03 NOTE — PLAN OF CARE
Problem: Adult Inpatient Plan of Care  Goal: Plan of Care Review  Outcome: Ongoing, Progressing  Goal: Patient-Specific Goal (Individualized)  Outcome: Ongoing, Progressing  Goal: Optimal Comfort and Wellbeing  Outcome: Ongoing, Progressing     Problem: Fall Injury Risk  Goal: Absence of Fall and Fall-Related Injury  Outcome: Ongoing, Progressing     Problem: Skin Injury Risk Increased  Goal: Skin Health and Integrity  Outcome: Ongoing, Progressing

## 2022-11-04 PROCEDURE — 97116 GAIT TRAINING THERAPY: CPT | Mod: CQ

## 2022-11-04 PROCEDURE — 94640 AIRWAY INHALATION TREATMENT: CPT

## 2022-11-04 PROCEDURE — 94761 N-INVAS EAR/PLS OXIMETRY MLT: CPT

## 2022-11-04 PROCEDURE — 99900035 HC TECH TIME PER 15 MIN (STAT)

## 2022-11-04 PROCEDURE — 97110 THERAPEUTIC EXERCISES: CPT

## 2022-11-04 PROCEDURE — 25000242 PHARM REV CODE 250 ALT 637 W/ HCPCS: Performed by: FAMILY MEDICINE

## 2022-11-04 PROCEDURE — 25000003 PHARM REV CODE 250: Performed by: FAMILY MEDICINE

## 2022-11-04 PROCEDURE — 27000221 HC OXYGEN, UP TO 24 HOURS

## 2022-11-04 PROCEDURE — 25000003 PHARM REV CODE 250: Performed by: SPECIALIST

## 2022-11-04 PROCEDURE — 63600175 PHARM REV CODE 636 W HCPCS: Performed by: INTERNAL MEDICINE

## 2022-11-04 PROCEDURE — 97110 THERAPEUTIC EXERCISES: CPT | Mod: CQ

## 2022-11-04 PROCEDURE — 11000004 HC SNF PRIVATE

## 2022-11-04 RX ADMIN — SULFAMETHOXAZOLE AND TRIMETHOPRIM 1 TABLET: 400; 80 TABLET ORAL at 08:11

## 2022-11-04 RX ADMIN — ENOXAPARIN SODIUM 90 MG: 100 INJECTION SUBCUTANEOUS at 09:11

## 2022-11-04 RX ADMIN — IPRATROPIUM BROMIDE AND ALBUTEROL SULFATE 3 ML: 2.5; .5 SOLUTION RESPIRATORY (INHALATION) at 07:11

## 2022-11-04 RX ADMIN — ATORVASTATIN CALCIUM 10 MG: 10 TABLET, FILM COATED ORAL at 08:11

## 2022-11-04 RX ADMIN — HYDROCHLOROTHIAZIDE 12.5 MG: 12.5 TABLET ORAL at 08:11

## 2022-11-04 RX ADMIN — SENNOSIDES AND DOCUSATE SODIUM 1 TABLET: 50; 8.6 TABLET ORAL at 08:11

## 2022-11-04 RX ADMIN — WARFARIN SODIUM 5 MG: 5 TABLET ORAL at 04:11

## 2022-11-04 RX ADMIN — OXYCODONE HYDROCHLORIDE AND ACETAMINOPHEN 1 TABLET: 10; 325 TABLET ORAL at 08:11

## 2022-11-04 RX ADMIN — ENOXAPARIN SODIUM 90 MG: 100 INJECTION SUBCUTANEOUS at 10:11

## 2022-11-04 RX ADMIN — OXYCODONE HYDROCHLORIDE AND ACETAMINOPHEN 1 TABLET: 10; 325 TABLET ORAL at 04:11

## 2022-11-04 RX ADMIN — PAROXETINE 20 MG: 10 TABLET, FILM COATED ORAL at 08:11

## 2022-11-04 RX ADMIN — LOSARTAN POTASSIUM 25 MG: 25 TABLET, FILM COATED ORAL at 08:11

## 2022-11-04 RX ADMIN — FLUTICASONE FUROATE AND VILANTEROL TRIFENATATE 1 PUFF: 200; 25 POWDER RESPIRATORY (INHALATION) at 08:11

## 2022-11-04 NOTE — PLAN OF CARE
Problem: Physical Therapy  Goal: Physical Therapy Goal  Description: Short Term Goals  1. Patient will complete 30 reps of B LE exercises with correct form.   2. Patient will complete sit<>stand transfers with SBA.  3. Patient will ambulate 100 feet with hemiwalker on level surfaces with CGA.     Long Term Goals   1. Patient will ambulate 300 feet with hemiwalker on level and unlevel surfaces with SBA.  2. Patient will complete all functional transfers with MOD I.   3. Patient will negotiate up and down 5 stairs with use of handrail with CGA  Outcome: Ongoing, Progressing   Continue POC Per PT order to progress patient toward rehab goals as tolerated by patient.  KATRINA Jolley 11/4/2022

## 2022-11-04 NOTE — CONSULTS
"  [facility]  Adult Nutrition  Consult Note    SUMMARY     Recommendations    Recommendation/Intervention: 1. Regular diet with chopped meats and gravy 2. honor prefs  Goals: meet EEN >75%  Nutrition Goal Status: new    Assessment and Plan    No new Assessment & Plan notes have been filed under this hospital service since the last note was generated.  Service: Nutrition       Malnutrition Assessment  Malnutrition Type:  (Based on assessment this pt is not malnourished.)                                    Reason for Assessment    Reason For Assessment: consult (swing bed pt)  Diagnosis:  (fall wtih L humerus fx, UTI)  Relevant Medical History: Adv age, COPD, HTN, HLD  Interdisciplinary Rounds: did not attend  General Information Comments: RDN visited pt today and took prefs.  She wears dentures but she does have trouble chewing meats.  Meal intake ~75% since admit. RDN enc intake for healing.    Nutrition Risk Screen    Nutrition Risk Screen: no indicators present    Nutrition/Diet History    Patient Reported Diet/Restrictions/Preferences: general  Spiritual, Cultural Beliefs, Islam Practices, Values that Affect Care: no  Food Allergies: NKFA  Factors Affecting Nutritional Intake: chewing difficulties/inability to chew food    Anthropometrics    Temp: 97.7 °F (36.5 °C)  Height: 5' 8" (172.7 cm)  Height (inches): 68 in  Weight Method: Standard Scale  Weight: 87 kg (191 lb 12.8 oz)  Weight (lb): 191.8 lb  Ideal Body Weight (IBW), Female: 140 lb  % Ideal Body Weight, Female (lb): 137 %  BMI (Calculated): 29.2  BMI Grade: 25 - 29.9 - overweight       Lab/Procedures/Meds    Pertinent Labs Reviewed: reviewed  Pertinent Medications Reviewed: reviewed  Pertinent Medications Comments: HCTZ, Coumadin (INR noted), Paxil, Lipitor    Physical Findings/Assessment         Estimated/Assessed Needs    Weight Used For Calorie Calculations: 69.4 kg (153 lb)  Energy Calorie Requirements (kcal): " 8719-7210  Energy Need Method: Kcal/kg  Protein Requirements: 69-76  Weight Used For Protein Calculations: 69 kg (152 lb 1.9 oz)        RDA Method (mL): 1735         Nutrition Prescription Ordered    Current Diet Order: Low Na    Evaluation of Received Nutrient/Fluid Intake    Energy Calories Required: meeting needs  Protein Required: meeting needs  Fluid Required: meeting needs  Tolerance: tolerating (would benefit from chopped meats)  % Intake of Estimated Energy Needs: 75 - 100 %  % Meal Intake: 75 - 100 %    Nutrition Risk    Level of Risk/Frequency of Follow-up: moderate       Monitor and Evaluation    Food and Nutrient Intake: food and beverage intake  Anthropometric Measurements: weight  Biochemical Data, Medical Tests and Procedures: electrolyte and renal panel  Nutrition-Focused Physical Findings: overall appearance       Nutrition Follow-Up    RD Follow-up?: Yes

## 2022-11-04 NOTE — PT/OT/SLP PROGRESS
"Occupational Therapy  Treatment    Verito Weston   MRN: [id number]   Admitting Diagnosis: Closed fracture of left proximal humerus    OT Date of Treatment: 11/04/22   OT Start Time: 0926  OT Stop Time: 0957  OT Total Time (min): 31 min    Billable Minutes:  Therapeutic Exercise 31 min               General Precautions: Standard, fall  Orthopedic Precautions: LUE non weight bearing  Braces: UE Sling  Respiratory Status: Nasal cannula, flow 2 L/min         Subjective:  Communicated with RN prior to session.    Pain/Comfort  Pain Rating 1: 0/10    Objective:        Functional Mobility:  Bed Mobility:       Transfers:        Functional Ambulation:     Activities of Daily Living:       Therapeutic Activities and Exercises:  Patient completed the following for increased strength to increase I with ADLs: 1# DB RUE- shoulder press, chest press, bicep curls x 30, red theraband- tricep extension RUE x 30; green handgripper x 50 RUE; PROM to left elbow flex/ext. UBE 6 min with RUE only      AM-PAC 6 CLICK ADL   How much help from another person does this patient currently need?   1 = Unable, Total/Dependent Assistance  2 = A lot, Maximum/Moderate Assistance  3 = A little, Minimum/Contact Guard/Supervision  4 = None, Modified [place]/Independent    Putting on and taking off regular lower body clothing? : 2  Bathing (including washing, rinsing, drying)?: 2  Toileting, which includes using toilet, bedpan, or urinal? : 3  Putting on and taking off regular upper body clothing?: 2  Taking care of personal grooming such as brushing teeth?: 3  Eating meals?: 4  Daily Activity Total Score: 16     AM-PAC Raw Score CMS "G-Code Modifier Level of Impairment Assistance   6 % Total / Unable   7 - 8 CM 80 - 100% Maximal Assist   9-13 CL 60 - 80% Moderate Assist   14 - 19 CK 40 - 60% Moderate Assist   20 - 22 CJ 20 - 40% Minimal Assist   23 CI 1-20% SBA / CGA   24 CH 0% Independent/ Mod I       Patient left up in chair with call " button in reach    ASSESSMENT:  Verito Weston is a 89 y.o. female with a medical diagnosis of Closed fracture of left proximal humerus and presents with decreased strength, decreased endurance, decreased self-care.    Rehab identified problem list/impairments: Rehab identified problem list/impairments: weakness, impaired endurance, impaired self care skills, impaired functional mobility, impaired balance, decreased upper extremity function, decreased lower extremity function, decreased safety awareness, pain    Rehab potential is good.    Activity tolerance: Good    Discharge recommendations: Discharge Facility/Level of Care Needs: home with home health     Barriers to discharge:      Equipment recommendations: 3-in-1 commode     GOALS:   Multidisciplinary Problems       Occupational Therapy Goals          Problem: Occupational Therapy    Goal Priority Disciplines Outcome Interventions   Occupational Therapy Goal     OT, PT/OT     Description: Description: Grooming Status:   Short Term Goal: Pt will perform grooming with min a sitting EOB.   Long Term Goal: Pt will perform grooming/oral hygiene standing at sink with s/u      LE dressing Status:   Short Term Goal: Pt will perform LE dressing with min a.   Long Term Goal: Pt will perform LE dressing with s/u.    Toileting Status:   Short Term Goal: Pt will perform toilet hygiene on BSC with min a.  Long Term Goal: Pt will perform toilet hygiene on toilet with no AE with s/u.    Commode Transfer:   Short Term Goal: Pt will perform BSC t/f with s/u.  Long Term Goal:  Pt will perform toilet t/f in bathroom with mod I.     Bathing Status:   Long Term Goal: Pt will perform sponge bath with s/u with no unsafe fatigue.     Strength Status:   Long Term Goal: Pt to perform BUE strengthening with weights and/or body weight to increase ADL independence and safety    Endurance Status:   Short Term Goal:pt to perform 15 min OT treatment with 5 or greater rest breaks  Long Term  Goal: pt to perform 30 min OT treat with 3 or less rest breaks                       Plan:  Patient to be seen 5 x/week to address the above listed problems via therapeutic exercises  Plan of Care expires:    Plan of Care reviewed with: patient       Leann Amrik, OTR/L    11/04/2022

## 2022-11-05 LAB
INR BLD: 1.67
PROTHROMBIN TIME: 19.6 SECONDS (ref 11.7–14.7)

## 2022-11-05 PROCEDURE — 25000242 PHARM REV CODE 250 ALT 637 W/ HCPCS: Performed by: FAMILY MEDICINE

## 2022-11-05 PROCEDURE — 94761 N-INVAS EAR/PLS OXIMETRY MLT: CPT

## 2022-11-05 PROCEDURE — 94640 AIRWAY INHALATION TREATMENT: CPT

## 2022-11-05 PROCEDURE — 36415 COLL VENOUS BLD VENIPUNCTURE: CPT | Performed by: INTERNAL MEDICINE

## 2022-11-05 PROCEDURE — 25000003 PHARM REV CODE 250: Performed by: SPECIALIST

## 2022-11-05 PROCEDURE — 63600175 PHARM REV CODE 636 W HCPCS: Performed by: INTERNAL MEDICINE

## 2022-11-05 PROCEDURE — 99900035 HC TECH TIME PER 15 MIN (STAT)

## 2022-11-05 PROCEDURE — 11000004 HC SNF PRIVATE

## 2022-11-05 PROCEDURE — 27000221 HC OXYGEN, UP TO 24 HOURS

## 2022-11-05 PROCEDURE — 85610 PROTHROMBIN TIME: CPT | Performed by: INTERNAL MEDICINE

## 2022-11-05 PROCEDURE — 25000003 PHARM REV CODE 250: Performed by: FAMILY MEDICINE

## 2022-11-05 RX ADMIN — ATORVASTATIN CALCIUM 10 MG: 10 TABLET, FILM COATED ORAL at 08:11

## 2022-11-05 RX ADMIN — FLUTICASONE FUROATE AND VILANTEROL TRIFENATATE 1 PUFF: 200; 25 POWDER RESPIRATORY (INHALATION) at 08:11

## 2022-11-05 RX ADMIN — SULFAMETHOXAZOLE AND TRIMETHOPRIM 1 TABLET: 400; 80 TABLET ORAL at 08:11

## 2022-11-05 RX ADMIN — OXYCODONE HYDROCHLORIDE AND ACETAMINOPHEN 1 TABLET: 10; 325 TABLET ORAL at 08:11

## 2022-11-05 RX ADMIN — ENOXAPARIN SODIUM 90 MG: 100 INJECTION SUBCUTANEOUS at 10:11

## 2022-11-05 RX ADMIN — PAROXETINE 20 MG: 10 TABLET, FILM COATED ORAL at 08:11

## 2022-11-05 RX ADMIN — IPRATROPIUM BROMIDE AND ALBUTEROL SULFATE 3 ML: 2.5; .5 SOLUTION RESPIRATORY (INHALATION) at 07:11

## 2022-11-05 RX ADMIN — SENNOSIDES AND DOCUSATE SODIUM 1 TABLET: 50; 8.6 TABLET ORAL at 08:11

## 2022-11-05 RX ADMIN — LOSARTAN POTASSIUM 25 MG: 25 TABLET, FILM COATED ORAL at 08:11

## 2022-11-05 RX ADMIN — HYDROCHLOROTHIAZIDE 12.5 MG: 12.5 TABLET ORAL at 08:11

## 2022-11-05 RX ADMIN — WARFARIN SODIUM 2.5 MG: 2.5 TABLET ORAL at 04:11

## 2022-11-05 RX ADMIN — ENOXAPARIN SODIUM 90 MG: 100 INJECTION SUBCUTANEOUS at 09:11

## 2022-11-06 PROCEDURE — 25000003 PHARM REV CODE 250: Performed by: SPECIALIST

## 2022-11-06 PROCEDURE — 27000221 HC OXYGEN, UP TO 24 HOURS

## 2022-11-06 PROCEDURE — 94640 AIRWAY INHALATION TREATMENT: CPT

## 2022-11-06 PROCEDURE — 94761 N-INVAS EAR/PLS OXIMETRY MLT: CPT

## 2022-11-06 PROCEDURE — 97110 THERAPEUTIC EXERCISES: CPT

## 2022-11-06 PROCEDURE — 63600175 PHARM REV CODE 636 W HCPCS: Performed by: INTERNAL MEDICINE

## 2022-11-06 PROCEDURE — 99900035 HC TECH TIME PER 15 MIN (STAT)

## 2022-11-06 PROCEDURE — 25000242 PHARM REV CODE 250 ALT 637 W/ HCPCS: Performed by: FAMILY MEDICINE

## 2022-11-06 PROCEDURE — 11000004 HC SNF PRIVATE

## 2022-11-06 PROCEDURE — 97116 GAIT TRAINING THERAPY: CPT

## 2022-11-06 PROCEDURE — 25000003 PHARM REV CODE 250: Performed by: FAMILY MEDICINE

## 2022-11-06 RX ADMIN — SENNOSIDES AND DOCUSATE SODIUM 1 TABLET: 50; 8.6 TABLET ORAL at 08:11

## 2022-11-06 RX ADMIN — FLUTICASONE FUROATE AND VILANTEROL TRIFENATATE 1 PUFF: 200; 25 POWDER RESPIRATORY (INHALATION) at 08:11

## 2022-11-06 RX ADMIN — SULFAMETHOXAZOLE AND TRIMETHOPRIM 1 TABLET: 400; 80 TABLET ORAL at 08:11

## 2022-11-06 RX ADMIN — OXYCODONE HYDROCHLORIDE AND ACETAMINOPHEN 1 TABLET: 10; 325 TABLET ORAL at 08:11

## 2022-11-06 RX ADMIN — PAROXETINE 20 MG: 10 TABLET, FILM COATED ORAL at 08:11

## 2022-11-06 RX ADMIN — LOSARTAN POTASSIUM 25 MG: 25 TABLET, FILM COATED ORAL at 08:11

## 2022-11-06 RX ADMIN — ENOXAPARIN SODIUM 90 MG: 100 INJECTION SUBCUTANEOUS at 09:11

## 2022-11-06 RX ADMIN — ATORVASTATIN CALCIUM 10 MG: 10 TABLET, FILM COATED ORAL at 08:11

## 2022-11-06 RX ADMIN — HYDROCHLOROTHIAZIDE 12.5 MG: 12.5 TABLET ORAL at 08:11

## 2022-11-06 RX ADMIN — IPRATROPIUM BROMIDE AND ALBUTEROL SULFATE 3 ML: 2.5; .5 SOLUTION RESPIRATORY (INHALATION) at 07:11

## 2022-11-06 RX ADMIN — WARFARIN SODIUM 2.5 MG: 2.5 TABLET ORAL at 04:11

## 2022-11-06 RX ADMIN — ENOXAPARIN SODIUM 90 MG: 100 INJECTION SUBCUTANEOUS at 10:11

## 2022-11-06 NOTE — PT/OT/SLP PROGRESS
"Physical Therapy  Treatment    Verito Weston   MRN: [id number]   Admitting Diagnosis: Closed fracture of left proximal humerus    PT Received On: 11/06/22  PT Start Time: 1011     PT Stop Time: 1050    PT Total Time (min): 39 min       Billable Minutes:  Gait Training 9 and Therapeutic Exercise 30    Treatment Type: Treatment  PT/PTA: PT     PTA Visit Number: 0       General Precautions: Standard, fall  Orthopedic Precautions: LUE non weight bearing   Braces: UE Sling  Respiratory Status: Nasal cannula, flow 2 L/min    Spiritual, Cultural Beliefs, Faith Practices, Values that Affect Care: no    Subjective:  "I'm doing pretty good today."     Pain/Comfort  Pain Rating 1: 0/10    Objective:   Patient found with: oxygen    Functional Mobility:  Bed Mobility: not performed     Transfers: sit <> stand from dagmar chair with CGA for safety        Gait: 150' with straight  Cane with CGA, frequent cueing to decrease gait speed       Stairs: Not performed    Therapeutic Activities and Exercises:    Ther- Ex    Nustep    Ankle pumps 30 x    Hip adduction 30 x 3"   Heelslides    LAQ's 30 x 1.5#   Hamstring Curls 30 x red tband   Quad sets  30 x 3"   SAQ's 30x 3"   Straight Leg Raises 2 x 10 B    Hip abduction 30 x red tband   Horizontal ADD/ABD    Glut sets  30 x        Ther-Act    Heel Raises    Mini Squats    3 way hip    Sit <> stands  1 x                      AM-PAC 6 CLICK MOBILITY  How much help from another person does this patient currently need?   1 = Unable, Total/Dependent Assistance  2 = A lot, Maximum/Moderate Assistance  3 = A little, Minimum/Contact Guard/Supervision  4 = None, Modified [place]/Independent    Turning over in bed (including adjusting bedclothes, sheets and blankets)?: 3  Sitting down on and standing up from a chair with arms (e.g., wheelchair, bedside commode, etc.): 3  Moving from lying on back to sitting on the side of the bed?: 3  Moving to and from a bed to a chair (including a " wheelchair)?: 3  Need to walk in hospital room?: 3  Climbing 3-5 steps with a railing?: 2  Basic Mobility Total Score: 17    AM-PAC Raw Score CMS G-Code Modifier Level of Impairment Assistance   6 % Total / Unable   7 - 9 CM 80 - 100% Maximal Assist   10 - 14 CL 60 - 80% Moderate Assist   15 - 19 CK 40 - 60% Moderate Assist   20 - 22 CJ 20 - 40% Minimal Assist   23 CI 1-20% SBA / CGA   24 CH 0% Independent/ Mod I     Patient left up in chair with call button in reach.    Assessment:  Verito Weston is a 89 y.o. female with a medical diagnosis of Closed fracture of left proximal humerus and presents with muscle weakness, gait instability, decreased UE use, and impaired balance. Patient required frequent cueing during LE exercises to decrease speed and increase movement through full available ROM. Patient required frequent rest breaks due to fatigue and [place]. Patient had no reports of adverse effects to treatment tasks. PT also provided cueing during ambulation to decreased speed for increased safety. PT instructed patient in pursed lip breathing for recovery during episodes of [place].       Rehab identified problem list/impairments: Rehab identified problem list/impairments: weakness, impaired endurance, impaired functional mobility, gait instability, impaired balance, pain    Rehab potential is good.    Activity tolerance: Fair    Discharge recommendations: Discharge Facility/Level of Care Needs: home with home health     Barriers to discharge:      Equipment recommendations: Equipment Needed After Discharge: 3-in-1 commode     GOALS:   Multidisciplinary Problems       Physical Therapy Goals          Problem: Physical Therapy    Goal Priority Disciplines Outcome Goal Variances Interventions   Physical Therapy Goal     PT, PT/OT Ongoing, Progressing     Description: Short Term Goals  1. Patient will complete 30 reps of B LE exercises with correct form.   2. Patient will complete sit<>stand transfers with  SBA.  3. Patient will ambulate 100 feet with hemiwalker on level surfaces with CGA.     Long Term Goals   1. Patient will ambulate 300 feet with hemiwalker on level and unlevel surfaces with SBA.  2. Patient will complete all functional transfers with MOD I.   3. Patient will negotiate up and down 5 stairs with use of handrail with CGA                       PLAN:    Patient to be seen 5 x/week  to address the above listed problems via gait training, therapeutic activities, therapeutic exercises  Plan of Care expires: 11/18/22  Plan of Care reviewed with: patient     Continue POC per PT order to progress patient toward rehab goals as tolerated by patient.    John Gomes, PT, DPT      11/6/2022

## 2022-11-06 NOTE — PT/OT/SLP PROGRESS
"Occupational Therapy  Treatment    Verito Weston   MRN: [id number]   Admitting Diagnosis: Closed fracture of left proximal humerus    OT Date of Treatment: 11/06/22   OT Start Time: 0943  OT Stop Time: 1007  OT Total Time (min): 24 min    Billable Minutes:  Therapeutic Exercise 24 min               General Precautions: Standard, fall  Orthopedic Precautions: LUE non weight bearing  Braces: UE Sling  Respiratory Status: Nasal cannula, flow 2 L/min         Subjective:  Communicated with RN prior to session.    Pain/Comfort  Pain Rating 1: 0/10    Objective:        Functional Mobility:  Bed Mobility:       Transfers:        Functional Ambulation:     Activities of Daily Living:       Therapeutic Activities and Exercises:  Patient completed the following for increased strength to increase I with ADLs: 1# DB RUE- shoulder press, chest press, bicep curls x 30, red theraband- tricep extension RUE x 30; green handgripper x 50 RUE; PROM to left elbow flex/ext. UBE 6 min with RUE only      AM-PAC 6 CLICK ADL   How much help from another person does this patient currently need?   1 = Unable, Total/Dependent Assistance  2 = A lot, Maximum/Moderate Assistance  3 = A little, Minimum/Contact Guard/Supervision  4 = None, Modified [place]/Independent    Putting on and taking off regular lower body clothing? : 2  Bathing (including washing, rinsing, drying)?: 2  Toileting, which includes using toilet, bedpan, or urinal? : 3  Putting on and taking off regular upper body clothing?: 2  Taking care of personal grooming such as brushing teeth?: 3  Eating meals?: 4  Daily Activity Total Score: 16     AM-PAC Raw Score CMS "G-Code Modifier Level of Impairment Assistance   6 % Total / Unable   7 - 8 CM 80 - 100% Maximal Assist   9-13 CL 60 - 80% Moderate Assist   14 - 19 CK 40 - 60% Moderate Assist   20 - 22 CJ 20 - 40% Minimal Assist   23 CI 1-20% SBA / CGA   24 CH 0% Independent/ Mod I       Patient left up in chair with  PT " notified    ASSESSMENT:  Verito Weston is a 89 y.o. female with a medical diagnosis of Closed fracture of left proximal humerus and presents with decreased strength, decreased endurance, decreased self-care.    Rehab identified problem list/impairments: Rehab identified problem list/impairments: weakness, impaired endurance, impaired self care skills, impaired functional mobility, impaired balance, decreased upper extremity function, decreased lower extremity function, decreased safety awareness, pain    Rehab potential is good.    Activity tolerance: Good    Discharge recommendations: Discharge Facility/Level of Care Needs: home with home health     Barriers to discharge:      Equipment recommendations: 3-in-1 commode     GOALS:   Multidisciplinary Problems       Occupational Therapy Goals          Problem: Occupational Therapy    Goal Priority Disciplines Outcome Interventions   Occupational Therapy Goal     OT, PT/OT     Description: Description: Grooming Status:   Short Term Goal: Pt will perform grooming with min a sitting EOB.   Long Term Goal: Pt will perform grooming/oral hygiene standing at sink with s/u      LE dressing Status:   Short Term Goal: Pt will perform LE dressing with min a.   Long Term Goal: Pt will perform LE dressing with s/u.    Toileting Status:   Short Term Goal: Pt will perform toilet hygiene on BSC with min a.  Long Term Goal: Pt will perform toilet hygiene on toilet with no AE with s/u.    Commode Transfer:   Short Term Goal: Pt will perform BSC t/f with s/u.  Long Term Goal:  Pt will perform toilet t/f in bathroom with mod I.     Bathing Status:   Long Term Goal: Pt will perform sponge bath with s/u with no unsafe fatigue.     Strength Status:   Long Term Goal: Pt to perform BUE strengthening with weights and/or body weight to increase ADL independence and safety    Endurance Status:   Short Term Goal:pt to perform 15 min OT treatment with 5 or greater rest breaks  Long Term Goal: pt  to perform 30 min OT treat with 3 or less rest breaks                       Plan:  Patient to be seen 5 x/week to address the above listed problems via therapeutic exercises  Plan of Care expires:    Plan of Care reviewed with: patient       Leann Amrik, OTR/L    11/06/2022

## 2022-11-06 NOTE — PLAN OF CARE
Problem: Adult Inpatient Plan of Care  Goal: Plan of Care Review  Outcome: Ongoing, Progressing  Flowsheets (Taken 11/6/2022 1701)  Plan of Care Reviewed With: patient  Goal: Optimal Comfort and Wellbeing  Outcome: Ongoing, Progressing  Intervention: Provide Person-Centered Care  Flowsheets (Taken 11/6/2022 1701)  Trust Relationship/Rapport:   thoughts/feelings acknowledged   empathic listening provided     Problem: Fall Injury Risk  Goal: Absence of Fall and Fall-Related Injury  Outcome: Ongoing, Progressing

## 2022-11-07 LAB
INR BLD: 1.83
PROTHROMBIN TIME: 21 SECONDS (ref 11.7–14.7)

## 2022-11-07 PROCEDURE — 94640 AIRWAY INHALATION TREATMENT: CPT

## 2022-11-07 PROCEDURE — 11000004 HC SNF PRIVATE

## 2022-11-07 PROCEDURE — 85610 PROTHROMBIN TIME: CPT | Performed by: FAMILY MEDICINE

## 2022-11-07 PROCEDURE — 97110 THERAPEUTIC EXERCISES: CPT

## 2022-11-07 PROCEDURE — 27000221 HC OXYGEN, UP TO 24 HOURS

## 2022-11-07 PROCEDURE — 25000003 PHARM REV CODE 250: Performed by: SPECIALIST

## 2022-11-07 PROCEDURE — 97116 GAIT TRAINING THERAPY: CPT

## 2022-11-07 PROCEDURE — 63600175 PHARM REV CODE 636 W HCPCS: Performed by: INTERNAL MEDICINE

## 2022-11-07 PROCEDURE — 94761 N-INVAS EAR/PLS OXIMETRY MLT: CPT

## 2022-11-07 PROCEDURE — 25000242 PHARM REV CODE 250 ALT 637 W/ HCPCS: Performed by: FAMILY MEDICINE

## 2022-11-07 PROCEDURE — 36415 COLL VENOUS BLD VENIPUNCTURE: CPT | Performed by: FAMILY MEDICINE

## 2022-11-07 PROCEDURE — 25000003 PHARM REV CODE 250: Performed by: FAMILY MEDICINE

## 2022-11-07 PROCEDURE — 99900035 HC TECH TIME PER 15 MIN (STAT)

## 2022-11-07 RX ADMIN — HYDROCHLOROTHIAZIDE 12.5 MG: 12.5 TABLET ORAL at 08:11

## 2022-11-07 RX ADMIN — LOSARTAN POTASSIUM 25 MG: 25 TABLET, FILM COATED ORAL at 08:11

## 2022-11-07 RX ADMIN — ENOXAPARIN SODIUM 90 MG: 100 INJECTION SUBCUTANEOUS at 10:11

## 2022-11-07 RX ADMIN — OXYCODONE HYDROCHLORIDE AND ACETAMINOPHEN 1 TABLET: 10; 325 TABLET ORAL at 03:11

## 2022-11-07 RX ADMIN — ATORVASTATIN CALCIUM 10 MG: 10 TABLET, FILM COATED ORAL at 08:11

## 2022-11-07 RX ADMIN — FLUTICASONE FUROATE AND VILANTEROL TRIFENATATE 1 PUFF: 200; 25 POWDER RESPIRATORY (INHALATION) at 08:11

## 2022-11-07 RX ADMIN — PAROXETINE 20 MG: 10 TABLET, FILM COATED ORAL at 08:11

## 2022-11-07 RX ADMIN — SENNOSIDES AND DOCUSATE SODIUM 1 TABLET: 50; 8.6 TABLET ORAL at 08:11

## 2022-11-07 RX ADMIN — ENOXAPARIN SODIUM 90 MG: 100 INJECTION SUBCUTANEOUS at 09:11

## 2022-11-07 RX ADMIN — WARFARIN SODIUM 5 MG: 5 TABLET ORAL at 04:11

## 2022-11-07 RX ADMIN — IPRATROPIUM BROMIDE AND ALBUTEROL SULFATE 3 ML: 2.5; .5 SOLUTION RESPIRATORY (INHALATION) at 07:11

## 2022-11-07 RX ADMIN — OXYCODONE HYDROCHLORIDE AND ACETAMINOPHEN 1 TABLET: 10; 325 TABLET ORAL at 08:11

## 2022-11-07 NOTE — PROGRESS NOTES
[facility]  Medical Surgical Unit  Hospital Medicine  Progress Note    Patient Name: Verito Weston  MRN: [id number]  Patient Class: IP- Swing   Admission Date: 10/28/2022  Length of Stay: 10 days  Attending Physician: Catrachita Hyman, *  Primary Care Provider: Maricel Avila MD        Subjective:     Principal Problem:Closed fracture of left proximal humerus        HPI:  No notes on file    Overview/Hospital Course:  10/31/22 - pt. Doing well this AM. No new complaints.    11/7/22 - pt. Doing well. Ready for therapy.      Interval History: doing well. No new complaints.    Review of Systems  Objective:     Vital Signs (Most Recent):  Temp: 98.5 °F (36.9 °C) (11/06/22 1947)  Pulse: (!) 121 (11/07/22 0805)  Resp: 20 (11/07/22 0805)  BP: 135/81 (11/06/22 1947)  SpO2: 97 % (11/07/22 0805)   Vital Signs (24h Range):  Temp:  [98.5 °F (36.9 °C)] 98.5 °F (36.9 °C)  Pulse:  [] 121  Resp:  [18-20] 20  SpO2:  [97 %-99 %] 97 %  BP: (135)/(81) 135/81     Weight: 87 kg (191 lb 12.8 oz)  Body mass index is 29.16 kg/m².  No intake or output data in the 24 hours ending 11/07/22 0838   Physical Exam    Significant Labs: All pertinent labs within the past 24 hours have been reviewed.    Significant Imaging: I have reviewed all pertinent imaging results/findings within the past 24 hours.      Assessment/Plan:      No notes have been filed under this hospital service.  Service: Hospital Medicine    VTE Risk Mitigation (From admission, onward)         Ordered     enoxaparin injection 90 mg  Every 12 hours (non-standard times)         11/03/22 0922     warfarin (COUMADIN) tablet 2.5 mg  Every Tues, Thurs, Sat, Sun         10/31/22 0817     warfarin (COUMADIN) tablet 5 mg  Every Mon, Wed, Fri        Question:  Is the patient competent?  Answer:  Yes    10/28/22 1401                Discharge Planning   TREASURE: 11/17/2022     Code Status: Full Code   Is the patient medically ready for discharge?:     Reason for  patient still in hospital (select all that apply): Patient trending condition  Discharge Plan A: Home Health                  Catrachita Hyman MD  Department of Hospital Medicine   [facility] - Medical Surgical Unit

## 2022-11-07 NOTE — H&P
[facility] - Medical Surgical [facility] Medicine  History & Physical    Patient Name: Verito Weston  MRN: [id number]  Patient Class: IP- Swing  Admission Date: 10/28/2022  Attending Physician: Catrachita Hyman, *   Primary Care Provider: Maricel Avila MD         Patient information was obtained from ER records.     Subjective:     Principal Problem:Dizziness    Chief Complaint: No chief complaint on file.       HPI: This 89 yr. Old WF has been switched to Swing Bed status. See H7P done by Dr. DEVIN Singletary      Past Medical History:   Diagnosis Date    Asthma     COPD (chronic obstructive pulmonary disease)     Hx of blood clots     Hypercholesterolemia     Hypertension        Past Surgical History:   Procedure Laterality Date    APPENDECTOMY      cataract surgery       CHOLECYSTECTOMY      HYSTERECTOMY      JOINT REPLACEMENT      left knee    SHOULDER SURGERY Bilateral        Review of patient's allergies indicates:  No Known Allergies    No current facility-administered medications on file prior to encounter.     Current Outpatient Medications on File Prior to Encounter   Medication Sig    atorvastatin (LIPITOR) 10 MG tablet 10 mg every evening.    hydroCHLOROthiazide (MICROZIDE) 12.5 mg capsule 12.5 mg once daily.    losartan (COZAAR) 25 MG tablet Take 25 mg by mouth once daily.    meclizine (ANTIVERT) 25 mg tablet 25 mg 3 (three) times daily as needed.    paroxetine (PAXIL) 20 MG tablet Take 20 mg by mouth once daily.    SYMBICORT 160-4.5 mcg/actuation HFAA     warfarin (COUMADIN) 5 MG tablet every Mon, Wed, Fri.    XIIDRA 5 % Dpet Apply 1 drop to eye 2 (two) times a day.    warfarin (COUMADIN) 2.5 MG tablet Take 2.5 mg by mouth every Tuesday, Thursday, Saturday, Sunday.     Family History    None       Tobacco Use    Smoking status: Never    Smokeless tobacco: Never   Substance and Sexual Activity    Alcohol use: Not Currently    Drug use: Not Currently    Sexual  activity: Never     Review of Systems  Objective:     Vital Signs (Most Recent):  Temp: 97.8 °F (36.6 °C) (11/07/22 0900)  Pulse: 83 (11/07/22 0900)  Resp: 18 (11/07/22 1535)  BP: 134/80 (11/07/22 0900)  SpO2: 96 % (11/07/22 0900) Vital Signs (24h Range):  Temp:  [97.8 °F (36.6 °C)-98.5 °F (36.9 °C)] 97.8 °F (36.6 °C)  Pulse:  [] 83  Resp:  [18-20] 18  SpO2:  [96 %-99 %] 96 %  BP: (134-135)/(80-81) 134/80     Weight: 87 kg (191 lb 12.8 oz)  Body mass index is 29.16 kg/m².    Physical Exam        Significant Labs: All pertinent labs within the past 24 hours have been reviewed.    Significant Imaging: I have reviewed all pertinent imaging results/findings within the past 24 hours.    Assessment/Plan:     * Dizziness          VTE Risk Mitigation (From admission, onward)         Ordered     enoxaparin injection 90 mg  Every 12 hours (non-standard times)         11/03/22 0922     warfarin (COUMADIN) tablet 2.5 mg  Every Tues, Thurs, Sat, Sun         10/31/22 0817     warfarin (COUMADIN) tablet 5 mg  Every Mon, Wed, Fri        Question:  Is the patient competent?  Answer:  Yes    10/28/22 1404                   Catrachita Hyman MD  Department of Hospital Medicine   [facility] - Medical Surgical Unit

## 2022-11-07 NOTE — PT/OT/SLP PROGRESS
"Physical Therapy  Treatment    Verito Weston   MRN: [id number]   Admitting Diagnosis: Closed fracture of left proximal humerus    PT Received On: 11/07/22  PT Start Time: 0914     PT Stop Time: 1002    PT Total Time (min): 48 min       Billable Minutes:  Gait Training 9 and Therapeutic Exercise 30    Treatment Type: Treatment  PT/PTA: PT     PTA Visit Number: 0       General Precautions: Standard, fall  Orthopedic Precautions: LUE non weight bearing   Braces: UE Sling  Respiratory Status: Nasal cannula, flow 2 L/min    Spiritual, Cultural Beliefs, Faith Practices, Values that Affect Care: no    Subjective:  "I'm doing pretty good today."     Pain/Comfort  Pain Rating 1: 0/10  Location - Side 1: Left  Location 1: shoulder    Objective:   Patient found with: oxygen    Functional Mobility:  Bed Mobility: not performed     Transfers: sit <> stand from dagmar chair with CGA for safety        Gait: 150' with straight  Cane with CGA, frequent cueing to decrease gait speed       Stairs: Not performed    Therapeutic Activities and Exercises:    Ther- Ex    Nustep    Ankle pumps 30 x    Hip adduction 30 x 3"   Heelslides    LAQ's 30 x 1.5# (no weight)   Hamstring Curls 30 x red tband   Quad sets  30 x 3"   SAQ's 30x 3"   Straight Leg Raises 2 x 10 B    Hip abduction 30 x red tband   Horizontal ADD/ABD 30 x each   Glut sets  30 x        Ther-Act    Heel Raises    Mini Squats    3 way hip    Sit <> stands  1 x                      AM-PAC 6 CLICK MOBILITY  How much help from another person does this patient currently need?   1 = Unable, Total/Dependent Assistance  2 = A lot, Maximum/Moderate Assistance  3 = A little, Minimum/Contact Guard/Supervision  4 = None, Modified [place]/Independent    Turning over in bed (including adjusting bedclothes, sheets and blankets)?: 3  Sitting down on and standing up from a chair with arms (e.g., wheelchair, bedside commode, etc.): 3  Moving from lying on back to sitting on the side of " "the bed?: 3  Moving to and from a bed to a chair (including a wheelchair)?: 3  Need to walk in hospital room?: 3  Climbing 3-5 steps with a railing?: 2  Basic Mobility Total Score: 17    AM-PAC Raw Score CMS G-Code Modifier Level of Impairment Assistance   6 % Total / Unable   7 - 9 CM 80 - 100% Maximal Assist   10 - 14 CL 60 - 80% Moderate Assist   15 - 19 CK 40 - 60% Moderate Assist   20 - 22 CJ 20 - 40% Minimal Assist   23 CI 1-20% SBA / CGA   24 CH 0% Independent/ Mod I     Patient left up in chair with call button in reach.    Assessment:  Verito Weston is a 89 y.o. female with a medical diagnosis of Closed fracture of left proximal humerus and presents with muscle weakness, gait instability, decreased UE use, and impaired balance. Patient required frequent cueing during LE exercises to decrease speed and increase movement through full available ROM. Patient required frequent rest breaks due to fatigue and [place]. Patient with difficulty staying awake during exercises and reporting "I don't know why I feel so tired." Patient had no reports of adverse effects to treatment. PT also provided cueing during ambulation to decrease speed and improve environmental awareness for increased safety.     Rehab identified problem list/impairments: Rehab identified problem list/impairments: weakness, impaired endurance, impaired functional mobility, gait instability, impaired balance, pain    Rehab potential is good.    Activity tolerance: Fair    Discharge recommendations: Discharge Facility/Level of Care Needs: home with home health     Barriers to discharge:      Equipment recommendations: Equipment Needed After Discharge: 3-in-1 commode     GOALS:   Multidisciplinary Problems       Physical Therapy Goals          Problem: Physical Therapy    Goal Priority Disciplines Outcome Goal Variances Interventions   Physical Therapy Goal     PT, PT/OT Ongoing, Progressing     Description: Short Term Goals  1. Patient will " complete 30 reps of B LE exercises with correct form.   2. Patient will complete sit<>stand transfers with SBA.  3. Patient will ambulate 100 feet with hemiwalker on level surfaces with CGA.     Long Term Goals   1. Patient will ambulate 300 feet with hemiwalker on level and unlevel surfaces with SBA.  2. Patient will complete all functional transfers with MOD I.   3. Patient will negotiate up and down 5 stairs with use of handrail with CGA                       PLAN:    Patient to be seen 5 x/week  to address the above listed problems via gait training, therapeutic activities, therapeutic exercises  Plan of Care expires: 11/18/22  Plan of Care reviewed with: patient     Continue POC per PT order to progress patient toward rehab goals as tolerated by patient.      Nevin Ness, PT, DPT      11/7/2022

## 2022-11-07 NOTE — PT/OT/SLP PROGRESS
"Occupational Therapy  Treatment    Verito Weston   MRN: [id number]   Admitting Diagnosis: Closed fracture of left proximal humerus    OT Date of Treatment: 11/07/22   OT Start Time: 1354  OT Stop Time: 1419  OT Total Time (min): 25 min    Billable Minutes:  Therapeutic Exercise 25 min               General Precautions: Standard, fall  Orthopedic Precautions: LUE non weight bearing  Braces: UE Sling  Respiratory Status: Nasal cannula, flow 2 L/min         Subjective:  Communicated with RN prior to session.    Pt voiced that nsg accidentally pulled on her LUE and it is very sore and still hurting her    Pain/Comfort  Pain Rating 1: 0/10    Objective:  Patient found with: oxygen     Functional Mobility:  Bed Mobility:       Transfers:        Functional Ambulation:     Activities of Daily Living:       Therapeutic Activities and Exercises:  Patient completed the following for increased strength to increase I with ADLs: 1# DB RUE- shoulder press, chest press, bicep curls x 30, red theraband- tricep extension RUE x 30; green handgripper x 50 RUE; PROM to left elbow flex/ext.       AM-PAC 6 CLICK ADL   How much help from another person does this patient currently need?   1 = Unable, Total/Dependent Assistance  2 = A lot, Maximum/Moderate Assistance  3 = A little, Minimum/Contact Guard/Supervision  4 = None, Modified [place]/Independent    Putting on and taking off regular lower body clothing? : 2  Bathing (including washing, rinsing, drying)?: 2  Toileting, which includes using toilet, bedpan, or urinal? : 3  Putting on and taking off regular upper body clothing?: 2  Taking care of personal grooming such as brushing teeth?: 3  Eating meals?: 4  Daily Activity Total Score: 16     AM-PAC Raw Score CMS "G-Code Modifier Level of Impairment Assistance   6 % Total / Unable   7 - 8 CM 80 - 100% Maximal Assist   9-13 CL 60 - 80% Moderate Assist   14 - 19 CK 40 - 60% Moderate Assist   20 - 22 CJ 20 - 40% Minimal Assist "   23 CI 1-20% SBA / CGA   24 CH 0% Independent/ Mod I       Patient left up in chair with call button in reach    ASSESSMENT:  Verito Weston is a 89 y.o. female with a medical diagnosis of Closed fracture of left proximal humerus and presents with decreased strength, decreased endurance, decreased self-care.    Rehab identified problem list/impairments: Rehab identified problem list/impairments: weakness, impaired endurance, impaired self care skills, impaired functional mobility, impaired balance, decreased lower extremity function, decreased safety awareness, decreased upper extremity function, pain    Rehab potential is good.    Activity tolerance: Good    Discharge recommendations: Discharge Facility/Level of Care Needs: home with home health     Barriers to discharge:      Equipment recommendations: 3-in-1 commode     GOALS:   Multidisciplinary Problems       Occupational Therapy Goals          Problem: Occupational Therapy    Goal Priority Disciplines Outcome Interventions   Occupational Therapy Goal     OT, PT/OT Ongoing, Progressing    Description: Description: Grooming Status:   Short Term Goal: Pt will perform grooming with min a sitting EOB.   Long Term Goal: Pt will perform grooming/oral hygiene standing at sink with s/u      LE dressing Status:   Short Term Goal: Pt will perform LE dressing with min a.   Long Term Goal: Pt will perform LE dressing with s/u.    Toileting Status:   Short Term Goal: Pt will perform toilet hygiene on BSC with min a.  Long Term Goal: Pt will perform toilet hygiene on toilet with no AE with s/u.    Commode Transfer:   Short Term Goal: Pt will perform BSC t/f with s/u.  Long Term Goal:  Pt will perform toilet t/f in bathroom with mod I.     Bathing Status:   Long Term Goal: Pt will perform sponge bath with s/u with no unsafe fatigue.     Strength Status:   Long Term Goal: Pt to perform BUE strengthening with weights and/or body weight to increase ADL independence and  safety    Endurance Status:   Short Term Goal:pt to perform 15 min OT treatment with 5 or greater rest breaks  Long Term Goal: pt to perform 30 min OT treat with 3 or less rest breaks                       Plan:  Patient to be seen 5 x/week to address the above listed problems via therapeutic exercises  Plan of Care expires:    Plan of Care reviewed with: patient       Leann RyanKenzie, OTR/L    11/07/2022

## 2022-11-07 NOTE — PLAN OF CARE
Problem: Fall Injury Risk  Goal: Absence of Fall and Fall-Related Injury  Outcome: Ongoing, Progressing  Intervention: Identify and Manage Contributors  Flowsheets (Taken 11/7/2022 1732)  Self-Care Promotion: independence encouraged  Medication Review/Management: medications reviewed  Intervention: Promote Injury-Free Environment  Flowsheets (Taken 11/7/2022 1732)  Safety Promotion/Fall Prevention: assistive device/personal item within reach     Problem: Skin Injury Risk Increased  Goal: Skin Health and Integrity  Outcome: Ongoing, Progressing  Intervention: Optimize Skin Protection  Flowsheets (Taken 11/7/2022 1732)  Pressure Reduction Techniques: frequent weight shift encouraged  Pressure Reduction Devices: feet on footrest/footstool  Skin Protection: adhesive use limited  Head of Bed (HOB) Positioning: HOB elevated  Intervention: Promote and Optimize Oral Intake  Flowsheets (Taken 11/7/2022 1732)  Oral Nutrition Promotion: calorie-dense foods provided

## 2022-11-07 NOTE — SUBJECTIVE & OBJECTIVE
Past Medical History:   Diagnosis Date    Asthma     COPD (chronic obstructive pulmonary disease)     Hx of blood clots     Hypercholesterolemia     Hypertension        Past Surgical History:   Procedure Laterality Date    APPENDECTOMY      cataract surgery       CHOLECYSTECTOMY      HYSTERECTOMY      JOINT REPLACEMENT      left knee    SHOULDER SURGERY Bilateral        Review of patient's allergies indicates:  No Known Allergies    No current facility-administered medications on file prior to encounter.     Current Outpatient Medications on File Prior to Encounter   Medication Sig    atorvastatin (LIPITOR) 10 MG tablet 10 mg every evening.    hydroCHLOROthiazide (MICROZIDE) 12.5 mg capsule 12.5 mg once daily.    losartan (COZAAR) 25 MG tablet Take 25 mg by mouth once daily.    meclizine (ANTIVERT) 25 mg tablet 25 mg 3 (three) times daily as needed.    paroxetine (PAXIL) 20 MG tablet Take 20 mg by mouth once daily.    SYMBICORT 160-4.5 mcg/actuation HFAA     warfarin (COUMADIN) 5 MG tablet every Mon, Wed, Fri.    XIIDRA 5 % Dpet Apply 1 drop to eye 2 (two) times a day.    warfarin (COUMADIN) 2.5 MG tablet Take 2.5 mg by mouth every Tuesday, Thursday, Saturday, Sunday.     Family History    None       Tobacco Use    Smoking status: Never    Smokeless tobacco: Never   Substance and Sexual Activity    Alcohol use: Not Currently    Drug use: Not Currently    Sexual activity: Never     Review of Systems  Objective:     Vital Signs (Most Recent):  Temp: 97.8 °F (36.6 °C) (11/07/22 0900)  Pulse: 83 (11/07/22 0900)  Resp: 18 (11/07/22 1535)  BP: 134/80 (11/07/22 0900)  SpO2: 96 % (11/07/22 0900) Vital Signs (24h Range):  Temp:  [97.8 °F (36.6 °C)-98.5 °F (36.9 °C)] 97.8 °F (36.6 °C)  Pulse:  [] 83  Resp:  [18-20] 18  SpO2:  [96 %-99 %] 96 %  BP: (134-135)/(80-81) 134/80     Weight: 87 kg (191 lb 12.8 oz)  Body mass index is 29.16 kg/m².    Physical Exam        Significant Labs: All pertinent labs within the past 24  hours have been reviewed.    Significant Imaging: I have reviewed all pertinent imaging results/findings within the past 24 hours.

## 2022-11-07 NOTE — SUBJECTIVE & OBJECTIVE
Interval History: doing well. No new complaints.    Review of Systems  Objective:     Vital Signs (Most Recent):  Temp: 98.5 °F (36.9 °C) (11/06/22 1947)  Pulse: (!) 121 (11/07/22 0805)  Resp: 20 (11/07/22 0805)  BP: 135/81 (11/06/22 1947)  SpO2: 97 % (11/07/22 0805)   Vital Signs (24h Range):  Temp:  [98.5 °F (36.9 °C)] 98.5 °F (36.9 °C)  Pulse:  [] 121  Resp:  [18-20] 20  SpO2:  [97 %-99 %] 97 %  BP: (135)/(81) 135/81     Weight: 87 kg (191 lb 12.8 oz)  Body mass index is 29.16 kg/m².  No intake or output data in the 24 hours ending 11/07/22 0838   Physical Exam    Significant Labs: All pertinent labs within the past 24 hours have been reviewed.    Significant Imaging: I have reviewed all pertinent imaging results/findings within the past 24 hours.

## 2022-11-07 NOTE — PLAN OF CARE
Problem: Occupational Therapy  Goal: Occupational Therapy Goal  Description: Description: Grooming Status:   Short Term Goal: Pt will perform grooming with min a sitting EOB.   Long Term Goal: Pt will perform grooming/oral hygiene standing at sink with s/u      LE dressing Status:   Short Term Goal: Pt will perform LE dressing with min a.   Long Term Goal: Pt will perform LE dressing with s/u.    Toileting Status:   Short Term Goal: Pt will perform toilet hygiene on BSC with min a.  Long Term Goal: Pt will perform toilet hygiene on toilet with no AE with s/u.    Commode Transfer:   Short Term Goal: Pt will perform BSC t/f with s/u.  Long Term Goal:  Pt will perform toilet t/f in bathroom with mod I.     Bathing Status:   Long Term Goal: Pt will perform sponge bath with s/u with no unsafe fatigue.     Strength Status:   Long Term Goal: Pt to perform BUE strengthening with weights and/or body weight to increase ADL independence and safety    Endurance Status:   Short Term Goal:pt to perform 15 min OT treatment with 5 or greater rest breaks  Long Term Goal: pt to perform 30 min OT treat with 3 or less rest breaks  Outcome: Ongoing, Progressing

## 2022-11-08 PROCEDURE — 63600175 PHARM REV CODE 636 W HCPCS: Performed by: INTERNAL MEDICINE

## 2022-11-08 PROCEDURE — 11000004 HC SNF PRIVATE

## 2022-11-08 PROCEDURE — 99900035 HC TECH TIME PER 15 MIN (STAT)

## 2022-11-08 PROCEDURE — 25000003 PHARM REV CODE 250: Performed by: SPECIALIST

## 2022-11-08 PROCEDURE — 94640 AIRWAY INHALATION TREATMENT: CPT

## 2022-11-08 PROCEDURE — 25000003 PHARM REV CODE 250: Performed by: FAMILY MEDICINE

## 2022-11-08 PROCEDURE — 25000242 PHARM REV CODE 250 ALT 637 W/ HCPCS: Performed by: FAMILY MEDICINE

## 2022-11-08 PROCEDURE — 27000221 HC OXYGEN, UP TO 24 HOURS

## 2022-11-08 PROCEDURE — 94761 N-INVAS EAR/PLS OXIMETRY MLT: CPT

## 2022-11-08 RX ADMIN — ENOXAPARIN SODIUM 90 MG: 100 INJECTION SUBCUTANEOUS at 09:11

## 2022-11-08 RX ADMIN — PAROXETINE 20 MG: 10 TABLET, FILM COATED ORAL at 08:11

## 2022-11-08 RX ADMIN — SENNOSIDES AND DOCUSATE SODIUM 1 TABLET: 50; 8.6 TABLET ORAL at 08:11

## 2022-11-08 RX ADMIN — OXYCODONE HYDROCHLORIDE AND ACETAMINOPHEN 1 TABLET: 10; 325 TABLET ORAL at 01:11

## 2022-11-08 RX ADMIN — OXYCODONE HYDROCHLORIDE AND ACETAMINOPHEN 1 TABLET: 10; 325 TABLET ORAL at 08:11

## 2022-11-08 RX ADMIN — ENOXAPARIN SODIUM 90 MG: 100 INJECTION SUBCUTANEOUS at 10:11

## 2022-11-08 RX ADMIN — ATORVASTATIN CALCIUM 10 MG: 10 TABLET, FILM COATED ORAL at 08:11

## 2022-11-08 RX ADMIN — HYDROCHLOROTHIAZIDE 12.5 MG: 12.5 TABLET ORAL at 08:11

## 2022-11-08 RX ADMIN — IPRATROPIUM BROMIDE AND ALBUTEROL SULFATE 3 ML: 2.5; .5 SOLUTION RESPIRATORY (INHALATION) at 08:11

## 2022-11-08 RX ADMIN — FLUTICASONE FUROATE AND VILANTEROL TRIFENATATE 1 PUFF: 200; 25 POWDER RESPIRATORY (INHALATION) at 08:11

## 2022-11-08 RX ADMIN — WARFARIN SODIUM 2.5 MG: 2.5 TABLET ORAL at 04:11

## 2022-11-08 RX ADMIN — LOSARTAN POTASSIUM 25 MG: 25 TABLET, FILM COATED ORAL at 08:11

## 2022-11-08 NOTE — PLAN OF CARE
Problem: Adult Inpatient Plan of Care  Goal: Optimal Comfort and Wellbeing  Outcome: Ongoing, Progressing  Goal: Readiness for Transition of Care  Outcome: Ongoing, Progressing     Problem: Fall Injury Risk  Goal: Absence of Fall and Fall-Related Injury  Outcome: Ongoing, Progressing  Intervention: Identify and Manage Contributors  Flowsheets (Taken 11/8/2022 1705)  Self-Care Promotion:   independence encouraged   safe use of adaptive equipment encouraged  Medication Review/Management: medications reviewed     Problem: Skin Injury Risk Increased  Goal: Skin Health and Integrity  Outcome: Ongoing, Progressing  Intervention: Optimize Skin Protection  Flowsheets (Taken 11/8/2022 1705)  Pressure Reduction Techniques: frequent weight shift encouraged  Skin Protection:   adhesive use limited   skin sealant/moisture barrier applied   tubing/devices free from skin contact

## 2022-11-09 PROCEDURE — 11000004 HC SNF PRIVATE

## 2022-11-09 PROCEDURE — 27200966 HC CLOSED SUCTION SYSTEM

## 2022-11-09 PROCEDURE — 94640 AIRWAY INHALATION TREATMENT: CPT

## 2022-11-09 PROCEDURE — 97116 GAIT TRAINING THERAPY: CPT

## 2022-11-09 PROCEDURE — 25000242 PHARM REV CODE 250 ALT 637 W/ HCPCS: Performed by: FAMILY MEDICINE

## 2022-11-09 PROCEDURE — 25000003 PHARM REV CODE 250: Performed by: SPECIALIST

## 2022-11-09 PROCEDURE — 97110 THERAPEUTIC EXERCISES: CPT

## 2022-11-09 PROCEDURE — 27000221 HC OXYGEN, UP TO 24 HOURS

## 2022-11-09 PROCEDURE — 25000003 PHARM REV CODE 250: Performed by: FAMILY MEDICINE

## 2022-11-09 PROCEDURE — 99900035 HC TECH TIME PER 15 MIN (STAT)

## 2022-11-09 PROCEDURE — 94761 N-INVAS EAR/PLS OXIMETRY MLT: CPT

## 2022-11-09 PROCEDURE — 63600175 PHARM REV CODE 636 W HCPCS: Performed by: INTERNAL MEDICINE

## 2022-11-09 RX ADMIN — ATORVASTATIN CALCIUM 10 MG: 10 TABLET, FILM COATED ORAL at 08:11

## 2022-11-09 RX ADMIN — OXYCODONE HYDROCHLORIDE AND ACETAMINOPHEN 1 TABLET: 10; 325 TABLET ORAL at 08:11

## 2022-11-09 RX ADMIN — WARFARIN SODIUM 5 MG: 5 TABLET ORAL at 05:11

## 2022-11-09 RX ADMIN — LOSARTAN POTASSIUM 25 MG: 25 TABLET, FILM COATED ORAL at 08:11

## 2022-11-09 RX ADMIN — IPRATROPIUM BROMIDE AND ALBUTEROL SULFATE 3 ML: 2.5; .5 SOLUTION RESPIRATORY (INHALATION) at 07:11

## 2022-11-09 RX ADMIN — OXYCODONE HYDROCHLORIDE AND ACETAMINOPHEN 1 TABLET: 10; 325 TABLET ORAL at 12:11

## 2022-11-09 RX ADMIN — FLUTICASONE FUROATE AND VILANTEROL TRIFENATATE 1 PUFF: 200; 25 POWDER RESPIRATORY (INHALATION) at 08:11

## 2022-11-09 RX ADMIN — SENNOSIDES AND DOCUSATE SODIUM 1 TABLET: 50; 8.6 TABLET ORAL at 08:11

## 2022-11-09 RX ADMIN — PAROXETINE 20 MG: 10 TABLET, FILM COATED ORAL at 08:11

## 2022-11-09 RX ADMIN — ENOXAPARIN SODIUM 90 MG: 100 INJECTION SUBCUTANEOUS at 11:11

## 2022-11-09 RX ADMIN — ENOXAPARIN SODIUM 90 MG: 100 INJECTION SUBCUTANEOUS at 10:11

## 2022-11-09 RX ADMIN — HYDROCHLOROTHIAZIDE 12.5 MG: 12.5 TABLET ORAL at 08:11

## 2022-11-09 NOTE — PT/OT/SLP PROGRESS
"Occupational Therapy  Treatment    Verito Weston   MRN: [id number]   Admitting Diagnosis: Aftercare for healing traumatic fracture of humerus, left    OT Date of Treatment: 11/09/22   OT Start Time: 0903  OT Stop Time: 0924  OT Total Time (min): 21 min    Billable Minutes:  Therapeutic Exercise 21 min               General Precautions: Standard, fall  Orthopedic Precautions: LUE non weight bearing  Braces: UE Sling  Respiratory Status: Nasal cannula, flow 2 L/min         Subjective:  Communicated with RN prior to session.        Pain/Comfort  Pain Rating 1: 0/10    Objective:  Patient found with: oxygen     Functional Mobility:  Bed Mobility:       Transfers:        Functional Ambulation:     Activities of Daily Living:       Therapeutic Activities and Exercises:  Patient completed the following for increased strength to increase I with ADLs: 1# DB RUE- shoulder press, chest press, bicep curls x 30, red theraband- tricep extension RUE x 30; PROM to left elbow flex/ext.; UBE 6 min      AM-PAC 6 CLICK ADL   How much help from another person does this patient currently need?   1 = Unable, Total/Dependent Assistance  2 = A lot, Maximum/Moderate Assistance  3 = A little, Minimum/Contact Guard/Supervision  4 = None, Modified [place]/Independent    Putting on and taking off regular lower body clothing? : 2  Bathing (including washing, rinsing, drying)?: 2  Toileting, which includes using toilet, bedpan, or urinal? : 3  Putting on and taking off regular upper body clothing?: 2  Taking care of personal grooming such as brushing teeth?: 3  Eating meals?: 4  Daily Activity Total Score: 16     AM-PAC Raw Score CMS "G-Code Modifier Level of Impairment Assistance   6 % Total / Unable   7 - 8 CM 80 - 100% Maximal Assist   9-13 CL 60 - 80% Moderate Assist   14 - 19 CK 40 - 60% Moderate Assist   20 - 22 CJ 20 - 40% Minimal Assist   23 CI 1-20% SBA / CGA   24 CH 0% Independent/ Mod I       Patient left up in chair with  " PT notified    ASSESSMENT:  Verito Weston is a 89 y.o. female with a medical diagnosis of Aftercare for healing traumatic fracture of humerus, left and presents with decreased strength, decreased endurance, decreased self-care.    Rehab identified problem list/impairments: Rehab identified problem list/impairments: weakness, impaired endurance, impaired self care skills, impaired functional mobility, impaired balance, decreased upper extremity function, decreased lower extremity function, decreased safety awareness    Rehab potential is good.    Activity tolerance: Good    Discharge recommendations: Discharge Facility/Level of Care Needs: home with home health     Barriers to discharge:      Equipment recommendations: 3-in-1 commode     GOALS:   Multidisciplinary Problems       Occupational Therapy Goals          Problem: Occupational Therapy    Goal Priority Disciplines Outcome Interventions   Occupational Therapy Goal     OT, PT/OT Ongoing, Progressing    Description: Description: Grooming Status:   Short Term Goal: Pt will perform grooming with min a sitting EOB.   Long Term Goal: Pt will perform grooming/oral hygiene standing at sink with s/u      LE dressing Status:   Short Term Goal: Pt will perform LE dressing with min a.   Long Term Goal: Pt will perform LE dressing with s/u.    Toileting Status:   Short Term Goal: Pt will perform toilet hygiene on BSC with min a.  Long Term Goal: Pt will perform toilet hygiene on toilet with no AE with s/u.    Commode Transfer:   Short Term Goal: Pt will perform BSC t/f with s/u.  Long Term Goal:  Pt will perform toilet t/f in bathroom with mod I.     Bathing Status:   Long Term Goal: Pt will perform sponge bath with s/u with no unsafe fatigue.     Strength Status:   Long Term Goal: Pt to perform BUE strengthening with weights and/or body weight to increase ADL independence and safety    Endurance Status:   Short Term Goal:pt to perform 15 min OT treatment with 5 or  greater rest breaks  Long Term Goal: pt to perform 30 min OT treat with 3 or less rest breaks                       Plan:  Patient to be seen 5 x/week to address the above listed problems via therapeutic exercises  Plan of Care expires:    Plan of Care reviewed with: patient       Leann Amrik, OTR/L    11/09/2022

## 2022-11-09 NOTE — PLAN OF CARE
Problem: Adult Inpatient Plan of Care  Goal: Plan of Care Review  Outcome: Ongoing, Progressing  Flowsheets (Taken 11/9/2022 3686)  Plan of Care Reviewed With: patient  Goal: Patient-Specific Goal (Individualized)  Outcome: Ongoing, Progressing  Goal: Absence of Hospital-Acquired Illness or Injury  Outcome: Ongoing, Progressing  Goal: Optimal Comfort and Wellbeing  Outcome: Ongoing, Progressing  Goal: Readiness for Transition of Care  Outcome: Ongoing, Progressing     Problem: Fall Injury Risk  Goal: Absence of Fall and Fall-Related Injury  Outcome: Ongoing, Progressing

## 2022-11-09 NOTE — PT/OT/SLP PROGRESS
"Physical Therapy  Treatment    Verito Weston   MRN: [id number]   Admitting Diagnosis: Aftercare for healing traumatic fracture of humerus, left    PT Received On: 11/09/22  PT Start Time: 0930     PT Stop Time: 1015    PT Total Time (min): 45 min       Billable Minutes:  Gait Training 12 and Therapeutic Exercise 33    Treatment Type: Treatment  PT/PTA: PT     PTA Visit Number: 0       General Precautions: Standard, fall  Orthopedic Precautions: LUE non weight bearing   Braces: UE Sling  Respiratory Status: Nasal cannula, flow 2 L/min    Spiritual, Cultural Beliefs, Buddhist Practices, Values that Affect Care: no    Subjective:  "I'm not bad today."     Pain/Comfort  Pain Rating 1: 0/10  Location - Side 1: Left  Location 1: shoulder  Pain Addressed 1: Pre-medicate for activity    Objective:   Patient found with: oxygen    Functional Mobility:  Bed Mobility: not performed     Transfers: sit <> stand from dagmar chair with CGA for safety        Gait: 180' with straight  Cane with CGA, frequent cueing to decrease gait speed and improve environmental awareness       Stairs: Not performed    Therapeutic Activities and Exercises:    Ther- Ex    Nustep    Ankle pumps 30 x    Hip adduction 30 x 3"   Heelslides    LAQ's 30 x 1.5# (no weight)   Hamstring Curls 30 x red tband   Quad sets  30 x 3"   SAQ's 30x 3"   Straight Leg Raises 2 x 10 B    Hip abduction 30 x red tband   Horizontal ADD/ABD 30 x each   Glut sets  30 x        Ther-Act    Heel Raises    Mini Squats    3 way hip    Sit <> stands  1 x                      AM-PAC 6 CLICK MOBILITY  How much help from another person does this patient currently need?   1 = Unable, Total/Dependent Assistance  2 = A lot, Maximum/Moderate Assistance  3 = A little, Minimum/Contact Guard/Supervision  4 = None, Modified [place]/Independent    Turning over in bed (including adjusting bedclothes, sheets and blankets)?: 3  Sitting down on and standing up from a chair with arms (e.g., " wheelchair, bedside commode, etc.): 3  Moving from lying on back to sitting on the side of the bed?: 3  Moving to and from a bed to a chair (including a wheelchair)?: 3  Need to walk in hospital room?: 3  Climbing 3-5 steps with a railing?: 2  Basic Mobility Total Score: 17    AM-PAC Raw Score CMS G-Code Modifier Level of Impairment Assistance   6 % Total / Unable   7 - 9 CM 80 - 100% Maximal Assist   10 - 14 CL 60 - 80% Moderate Assist   15 - 19 CK 40 - 60% Moderate Assist   20 - 22 CJ 20 - 40% Minimal Assist   23 CI 1-20% SBA / CGA   24 CH 0% Independent/ Mod I     Patient left up in chair with call button in reach.    Assessment:  Verito Weston is a 89 y.o. female with a medical diagnosis of Aftercare for healing traumatic fracture of humerus, left and presents with muscle weakness, gait instability, decreased UE use, and impaired balance. Patient required frequent cueing during LE exercises to decrease speed and increase movement through full available ROM. Patient required frequent rest breaks due to fatigue and [place]. Patient's gait remained unchanged in quality but increased distance performed with increased fatigue.     Rehab identified problem list/impairments: Rehab identified problem list/impairments: weakness, impaired endurance, impaired self care skills, impaired functional mobility, impaired balance, decreased upper extremity function, decreased lower extremity function, decreased safety awareness    Rehab potential is good.    Activity tolerance: Fair    Discharge recommendations: Discharge Facility/Level of Care Needs: home with home health     Barriers to discharge:      Equipment recommendations: Equipment Needed After Discharge: 3-in-1 commode     GOALS:   Multidisciplinary Problems       Physical Therapy Goals          Problem: Physical Therapy    Goal Priority Disciplines Outcome Goal Variances Interventions   Physical Therapy Goal     PT, PT/OT Ongoing, Progressing     Description:  Short Term Goals  1. Patient will complete 30 reps of B LE exercises with correct form.   2. Patient will complete sit<>stand transfers with SBA.  3. Patient will ambulate 100 feet with hemiwalker on level surfaces with CGA.     Long Term Goals   1. Patient will ambulate 300 feet with hemiwalker on level and unlevel surfaces with SBA.  2. Patient will complete all functional transfers with MOD I.   3. Patient will negotiate up and down 5 stairs with use of handrail with CGA                       PLAN:    Patient to be seen 5 x/week  to address the above listed problems via therapeutic activities, gait training, therapeutic exercises  Plan of Care expires: 11/18/22  Plan of Care reviewed with: patient     Continue POC per PT order to progress patient toward rehab goals as tolerated by patient.      Nevin Ness, PT, DPT      11/9/2022

## 2022-11-10 LAB
ANION GAP SERPL CALCULATED.3IONS-SCNC: 10 MMOL/L (ref 7–16)
BASOPHILS # BLD AUTO: 0.07 K/UL (ref 0–0.2)
BASOPHILS NFR BLD AUTO: 1.1 % (ref 0–1)
BUN SERPL-MCNC: 11 MG/DL (ref 7–18)
BUN/CREAT SERPL: 23 (ref 6–20)
CALCIUM SERPL-MCNC: 8.9 MG/DL (ref 8.5–10.1)
CHLORIDE SERPL-SCNC: 103 MMOL/L (ref 98–107)
CO2 SERPL-SCNC: 30 MMOL/L (ref 21–32)
CREAT SERPL-MCNC: 0.48 MG/DL (ref 0.55–1.02)
DIFFERENTIAL METHOD BLD: ABNORMAL
EGFR (NO RACE VARIABLE) (RUSH/TITUS): 90 ML/MIN/1.73M²
EOSINOPHIL # BLD AUTO: 0.36 K/UL (ref 0–0.5)
EOSINOPHIL NFR BLD AUTO: 5.5 % (ref 1–4)
ERYTHROCYTE [DISTWIDTH] IN BLOOD BY AUTOMATED COUNT: 14.7 % (ref 11.5–14.5)
GLUCOSE SERPL-MCNC: 104 MG/DL (ref 74–106)
HCT VFR BLD AUTO: 37.2 % (ref 38–47)
HGB BLD-MCNC: 11.5 G/DL (ref 12–16)
IMM GRANULOCYTES # BLD AUTO: 0.03 K/UL (ref 0–0.04)
IMM GRANULOCYTES NFR BLD: 0.5 % (ref 0–0.4)
INR BLD: 1.91
LYMPHOCYTES # BLD AUTO: 1.77 K/UL (ref 1–4.8)
LYMPHOCYTES NFR BLD AUTO: 27.1 % (ref 27–41)
MCH RBC QN AUTO: 29 PG (ref 27–31)
MCHC RBC AUTO-ENTMCNC: 30.9 G/DL (ref 32–36)
MCV RBC AUTO: 93.9 FL (ref 80–96)
MONOCYTES # BLD AUTO: 0.89 K/UL (ref 0–0.8)
MONOCYTES NFR BLD AUTO: 13.6 % (ref 2–6)
MPC BLD CALC-MCNC: 9.8 FL (ref 9.4–12.4)
NEUTROPHILS # BLD AUTO: 3.41 K/UL (ref 1.8–7.7)
NEUTROPHILS NFR BLD AUTO: 52.2 % (ref 53–65)
PLATELET # BLD AUTO: 291 K/UL (ref 150–400)
POTASSIUM SERPL-SCNC: 4.9 MMOL/L (ref 3.5–5.1)
PROTHROMBIN TIME: 21.7 SECONDS (ref 11.7–14.7)
RBC # BLD AUTO: 3.96 M/UL (ref 4.2–5.4)
SODIUM SERPL-SCNC: 138 MMOL/L (ref 136–145)
WBC # BLD AUTO: 6.53 K/UL (ref 4.5–11)

## 2022-11-10 PROCEDURE — 36415 COLL VENOUS BLD VENIPUNCTURE: CPT | Performed by: FAMILY MEDICINE

## 2022-11-10 PROCEDURE — 97110 THERAPEUTIC EXERCISES: CPT

## 2022-11-10 PROCEDURE — 97535 SELF CARE MNGMENT TRAINING: CPT

## 2022-11-10 PROCEDURE — 94640 AIRWAY INHALATION TREATMENT: CPT

## 2022-11-10 PROCEDURE — 94761 N-INVAS EAR/PLS OXIMETRY MLT: CPT

## 2022-11-10 PROCEDURE — 27000221 HC OXYGEN, UP TO 24 HOURS

## 2022-11-10 PROCEDURE — 80048 BASIC METABOLIC PNL TOTAL CA: CPT | Performed by: FAMILY MEDICINE

## 2022-11-10 PROCEDURE — 85610 PROTHROMBIN TIME: CPT | Performed by: FAMILY MEDICINE

## 2022-11-10 PROCEDURE — 63600175 PHARM REV CODE 636 W HCPCS: Performed by: INTERNAL MEDICINE

## 2022-11-10 PROCEDURE — 99900035 HC TECH TIME PER 15 MIN (STAT)

## 2022-11-10 PROCEDURE — 85025 COMPLETE CBC W/AUTO DIFF WBC: CPT | Performed by: FAMILY MEDICINE

## 2022-11-10 PROCEDURE — 25000242 PHARM REV CODE 250 ALT 637 W/ HCPCS: Performed by: FAMILY MEDICINE

## 2022-11-10 PROCEDURE — 11000004 HC SNF PRIVATE

## 2022-11-10 PROCEDURE — 25000003 PHARM REV CODE 250: Performed by: SPECIALIST

## 2022-11-10 PROCEDURE — 25000003 PHARM REV CODE 250: Performed by: FAMILY MEDICINE

## 2022-11-10 RX ADMIN — ENOXAPARIN SODIUM 90 MG: 100 INJECTION SUBCUTANEOUS at 11:11

## 2022-11-10 RX ADMIN — LOSARTAN POTASSIUM 25 MG: 25 TABLET, FILM COATED ORAL at 09:11

## 2022-11-10 RX ADMIN — ATORVASTATIN CALCIUM 10 MG: 10 TABLET, FILM COATED ORAL at 08:11

## 2022-11-10 RX ADMIN — PAROXETINE 20 MG: 10 TABLET, FILM COATED ORAL at 09:11

## 2022-11-10 RX ADMIN — WARFARIN SODIUM 2.5 MG: 2.5 TABLET ORAL at 04:11

## 2022-11-10 RX ADMIN — ENOXAPARIN SODIUM 90 MG: 100 INJECTION SUBCUTANEOUS at 09:11

## 2022-11-10 RX ADMIN — HYDROCHLOROTHIAZIDE 12.5 MG: 12.5 TABLET ORAL at 09:11

## 2022-11-10 RX ADMIN — IPRATROPIUM BROMIDE AND ALBUTEROL SULFATE 3 ML: 2.5; .5 SOLUTION RESPIRATORY (INHALATION) at 07:11

## 2022-11-10 RX ADMIN — SENNOSIDES AND DOCUSATE SODIUM 1 TABLET: 50; 8.6 TABLET ORAL at 08:11

## 2022-11-10 RX ADMIN — OXYCODONE HYDROCHLORIDE AND ACETAMINOPHEN 1 TABLET: 10; 325 TABLET ORAL at 08:11

## 2022-11-10 RX ADMIN — FLUTICASONE FUROATE AND VILANTEROL TRIFENATATE 1 PUFF: 200; 25 POWDER RESPIRATORY (INHALATION) at 08:11

## 2022-11-10 NOTE — PT/OT/SLP PROGRESS
"Occupational Therapy  Treatment    Verito Weston   MRN: [id number]   Admitting Diagnosis: Aftercare for healing traumatic fracture of humerus, left    OT Date of Treatment: 11/10/22   OT Start Time: 0825  OT Stop Time: 0853  OT Total Time (min): 28 min    Billable Minutes:  Self Care/Home Management 8 min and Therapeutic Exercise 20 min               General Precautions: Standard, fall  Orthopedic Precautions: LUE non weight bearing  Braces: UE Sling  Respiratory Status: Nasal cannula, flow 2 L/min         Subjective:  Communicated with RN prior to session.        Pain/Comfort  Pain Rating 1: 0/10    Objective:  Patient found with: oxygen     Functional Mobility:  Bed Mobility:       Transfers:        Functional Ambulation:     Activities of Daily Living:   Pt was CGA with a toilet t/f and s/u with toileting    Therapeutic Activities and Exercises:  Patient completed the following for increased strength to increase I with ADLs: 1# DB RUE- shoulder press, chest press, bicep curls x 30, red theraband- tricep extension RUE x 30; PROM to left elbow flex/ext.; UBE 6 min      AM-PAC 6 CLICK ADL   How much help from another person does this patient currently need?   1 = Unable, Total/Dependent Assistance  2 = A lot, Maximum/Moderate Assistance  3 = A little, Minimum/Contact Guard/Supervision  4 = None, Modified [place]/Independent    Putting on and taking off regular lower body clothing? : 2  Bathing (including washing, rinsing, drying)?: 2  Toileting, which includes using toilet, bedpan, or urinal? : 4  Putting on and taking off regular upper body clothing?: 3  Taking care of personal grooming such as brushing teeth?: 3  Eating meals?: 4  Daily Activity Total Score: 18     AM-PAC Raw Score CMS "G-Code Modifier Level of Impairment Assistance   6 % Total / Unable   7 - 8 CM 80 - 100% Maximal Assist   9-13 CL 60 - 80% Moderate Assist   14 - 19 CK 40 - 60% Moderate Assist   20 - 22 CJ 20 - 40% Minimal Assist   23 " CI 1-20% SBA / CGA   24 CH 0% Independent/ Mod I       Patient left up in chair with call button in reach    ASSESSMENT:  Verito Weston is a 89 y.o. female with a medical diagnosis of Aftercare for healing traumatic fracture of humerus, left and presents with decreased strength, decreased endurance, decreased self-care.    Rehab identified problem list/impairments: Rehab identified problem list/impairments: weakness, impaired endurance, impaired self care skills, impaired functional mobility, impaired balance, decreased upper extremity function, decreased lower extremity function, decreased safety awareness    Rehab potential is good.    Activity tolerance: Good    Discharge recommendations: Discharge Facility/Level of Care Needs: home with home health     Barriers to discharge:      Equipment recommendations: 3-in-1 commode     GOALS:   Multidisciplinary Problems       Occupational Therapy Goals          Problem: Occupational Therapy    Goal Priority Disciplines Outcome Interventions   Occupational Therapy Goal     OT, PT/OT Ongoing, Progressing    Description: Description: Grooming Status:   Short Term Goal: Pt will perform grooming with min a sitting EOB.   Long Term Goal: Pt will perform grooming/oral hygiene standing at sink with s/u      LE dressing Status:   Short Term Goal: Pt will perform LE dressing with min a.   Long Term Goal: Pt will perform LE dressing with s/u.    Toileting Status:   Short Term Goal: Pt will perform toilet hygiene on BSC with min a.  Long Term Goal: Pt will perform toilet hygiene on toilet with no AE with s/u.    Commode Transfer:   Short Term Goal: Pt will perform BSC t/f with s/u.  Long Term Goal:  Pt will perform toilet t/f in bathroom with mod I.     Bathing Status:   Long Term Goal: Pt will perform sponge bath with s/u with no unsafe fatigue.     Strength Status:   Long Term Goal: Pt to perform BUE strengthening with weights and/or body weight to increase ADL independence and  safety    Endurance Status:   Short Term Goal:pt to perform 15 min OT treatment with 5 or greater rest breaks  Long Term Goal: pt to perform 30 min OT treat with 3 or less rest breaks                       Plan:  Patient to be seen 5 x/week to address the above listed problems via therapeutic exercises, self-care/home management  Plan of Care expires:    Plan of Care reviewed with: patient       Leann [place], OTR/L    11/10/2022

## 2022-11-10 NOTE — PLAN OF CARE
Problem: Physical Therapy  Goal: Physical Therapy Goal  Description: Short Term Goals  1. Patient will complete 30 reps of B LE exercises with correct form.   2. Patient will complete sit<>stand transfers with SBA.  3. Patient will ambulate 100 feet with hemiwalker on level surfaces with CGA.     Long Term Goals   1. Patient will ambulate 300 feet with hemiwalker on level and unlevel surfaces with SBA.  2. Patient will complete all functional transfers with MOD I.   3. Patient will negotiate up and down 5 stairs with use of handrail with CGA  Outcome: Ongoing, Progressing   Continue POC per PT order to progress patient toward rehab goals as tolerated.  KATRINA Jolley 11/10/2022

## 2022-11-10 NOTE — PLAN OF CARE
Problem: Adult Inpatient Plan of Care  Goal: Plan of Care Review  Outcome: Ongoing, Progressing  Flowsheets (Taken 11/10/2022 6389)  Plan of Care Reviewed With: patient  Goal: Patient-Specific Goal (Individualized)  Outcome: Ongoing, Progressing  Goal: Absence of Hospital-Acquired Illness or Injury  Outcome: Ongoing, Progressing  Goal: Optimal Comfort and Wellbeing  Outcome: Ongoing, Progressing  Goal: Readiness for Transition of Care  Outcome: Ongoing, Progressing     Problem: Fall Injury Risk  Goal: Absence of Fall and Fall-Related Injury  Outcome: Ongoing, Progressing

## 2022-11-11 PROCEDURE — 99900035 HC TECH TIME PER 15 MIN (STAT)

## 2022-11-11 PROCEDURE — 11000004 HC SNF PRIVATE

## 2022-11-11 PROCEDURE — 25000003 PHARM REV CODE 250: Performed by: SPECIALIST

## 2022-11-11 PROCEDURE — 97110 THERAPEUTIC EXERCISES: CPT | Mod: CQ

## 2022-11-11 PROCEDURE — 27000221 HC OXYGEN, UP TO 24 HOURS

## 2022-11-11 PROCEDURE — 25000242 PHARM REV CODE 250 ALT 637 W/ HCPCS: Performed by: FAMILY MEDICINE

## 2022-11-11 PROCEDURE — 97116 GAIT TRAINING THERAPY: CPT | Mod: CQ

## 2022-11-11 PROCEDURE — 63600175 PHARM REV CODE 636 W HCPCS: Performed by: INTERNAL MEDICINE

## 2022-11-11 PROCEDURE — 99308 SBSQ NF CARE LOW MDM 20: CPT | Mod: ,,, | Performed by: FAMILY MEDICINE

## 2022-11-11 PROCEDURE — 94640 AIRWAY INHALATION TREATMENT: CPT

## 2022-11-11 PROCEDURE — 25000003 PHARM REV CODE 250: Performed by: FAMILY MEDICINE

## 2022-11-11 PROCEDURE — 97110 THERAPEUTIC EXERCISES: CPT

## 2022-11-11 PROCEDURE — 99308 PR NURSING FAC CARE, SUBSEQ, MINOR COMPLIC: ICD-10-PCS | Mod: ,,, | Performed by: FAMILY MEDICINE

## 2022-11-11 PROCEDURE — 94761 N-INVAS EAR/PLS OXIMETRY MLT: CPT

## 2022-11-11 RX ADMIN — SENNOSIDES AND DOCUSATE SODIUM 1 TABLET: 50; 8.6 TABLET ORAL at 08:11

## 2022-11-11 RX ADMIN — OXYCODONE HYDROCHLORIDE AND ACETAMINOPHEN 1 TABLET: 10; 325 TABLET ORAL at 08:11

## 2022-11-11 RX ADMIN — ENOXAPARIN SODIUM 90 MG: 100 INJECTION SUBCUTANEOUS at 10:11

## 2022-11-11 RX ADMIN — LOSARTAN POTASSIUM 25 MG: 25 TABLET, FILM COATED ORAL at 08:11

## 2022-11-11 RX ADMIN — FLUTICASONE FUROATE AND VILANTEROL TRIFENATATE 1 PUFF: 200; 25 POWDER RESPIRATORY (INHALATION) at 08:11

## 2022-11-11 RX ADMIN — WARFARIN SODIUM 5 MG: 5 TABLET ORAL at 05:11

## 2022-11-11 RX ADMIN — PAROXETINE 20 MG: 10 TABLET, FILM COATED ORAL at 08:11

## 2022-11-11 RX ADMIN — ATORVASTATIN CALCIUM 10 MG: 10 TABLET, FILM COATED ORAL at 08:11

## 2022-11-11 RX ADMIN — IPRATROPIUM BROMIDE AND ALBUTEROL SULFATE 3 ML: 2.5; .5 SOLUTION RESPIRATORY (INHALATION) at 07:11

## 2022-11-11 RX ADMIN — HYDROCHLOROTHIAZIDE 12.5 MG: 12.5 TABLET ORAL at 08:11

## 2022-11-11 NOTE — PT/OT/SLP PROGRESS
"Physical Therapy  Treatment    Verito Weston   MRN: [id number]   Admitting Diagnosis: Aftercare for healing traumatic fracture of humerus, left    PT Received On: 11/10/22  PT Start Time: 1520     PT Stop Time: 1601    PT Total Time (min): 41 min       Billable Minutes:  Gait Training 10 and Therapeutic Exercise 30    Treatment Type: Treatment  PT/PTA: PTA     PTA Visit Number: 1       General Precautions: Standard, fall  Orthopedic Precautions: LUE non weight bearing   Braces: UE Sling  Respiratory Status: Nasal cannula, flow 2 L/min    Spiritual, Cultural Beliefs, Baptist Practices, Values that Affect Care: no    Subjective:  "I feel pretty good today especially after I got a good bath".  Patient without present complaints of pain.       Pain/Comfort  Pain Rating 1: 0/10  Location - Orientation 1: generalized    Objective:   Patient found with: oxygen    Functional Mobility:  Bed Mobility: not performed     Transfers: sit <> stand from dagmar chair with CGA for safety        Gait: 180' with straight  Cane with CGA, frequent cueing to decrease gait speed and for proper cane placement to increase MILES        Stairs: Not performed    Therapeutic Activities and Exercises:    Ther- Ex    Nustep    Ankle pumps 30 x    Hip adduction 30 x 3"   Heelslides    LAQ's 30 x 1.5# (no weight)   Hamstring Curls 30 x red tband   Quad sets  30 x 3"   SAQ's 30x 3"   Straight Leg Raises    Hip abduction 30 x red tband   Horizontal ADD/ABD 30 x each   Glut sets  30 x        Ther-Act    Heel Raises    Mini Squats    3 way hip    Sit <> stands  1 x                      AM-PAC 6 CLICK MOBILITY  How much help from another person does this patient currently need?   1 = Unable, Total/Dependent Assistance  2 = A lot, Maximum/Moderate Assistance  3 = A little, Minimum/Contact Guard/Supervision  4 = None, Modified [place]/Independent    Turning over in bed (including adjusting bedclothes, sheets and blankets)?: 3  Sitting down on and " standing up from a chair with arms (e.g., wheelchair, bedside commode, etc.): 3  Moving from lying on back to sitting on the side of the bed?: 3  Moving to and from a bed to a chair (including a wheelchair)?: 3  Need to walk in hospital room?: 3  Climbing 3-5 steps with a railing?: 3  Basic Mobility Total Score: 18    AM-PAC Raw Score CMS G-Code Modifier Level of Impairment Assistance   6 % Total / Unable   7 - 9 CM 80 - 100% Maximal Assist   10 - 14 CL 60 - 80% Moderate Assist   15 - 19 CK 40 - 60% Moderate Assist   20 - 22 CJ 20 - 40% Minimal Assist   23 CI 1-20% SBA / CGA   24 CH 0% Independent/ Mod I     Patient left up in chair with call button in reach.    Assessment:  Verito Weston is a 89 y.o. female with a medical diagnosis of Aftercare for healing traumatic fracture of humerus, left and presents with muscle weakness, gait instability, decreased UE use, and impaired balance. Patient required frequent cueing during LE exercises to decrease speed and increase movement through full available ROM.  Patient requires occasional tactile cueing and CGA during gait training due to pathway deviations.  Patient presents with increased safety awareness during sit to stand transfers and during gait training. Patient reports muscle fatigue without adverse effects at end of PT treatment session.     Rehab identified problem list/impairments: Rehab identified problem list/impairments: weakness, impaired balance, impaired endurance, impaired functional mobility, impaired self care skills, decreased lower extremity function, decreased safety awareness, gait instability    Rehab potential is good.    Activity tolerance: Fair    Discharge recommendations: Discharge Facility/Level of Care Needs: home with home health     Barriers to discharge:      Equipment recommendations: Equipment Needed After Discharge: 3-in-1 commode     GOALS:   Multidisciplinary Problems       Physical Therapy Goals          Problem: Physical  Therapy    Goal Priority Disciplines Outcome Goal Variances Interventions   Physical Therapy Goal     PT, PT/OT Ongoing, Progressing     Description: Short Term Goals  1. Patient will complete 30 reps of B LE exercises with correct form.   2. Patient will complete sit<>stand transfers with SBA.  3. Patient will ambulate 100 feet with hemiwalker on level surfaces with CGA.     Long Term Goals   1. Patient will ambulate 300 feet with hemiwalker on level and unlevel surfaces with SBA.  2. Patient will complete all functional transfers with MOD I.   3. Patient will negotiate up and down 5 stairs with use of handrail with CGA                       PLAN:    Patient to be seen 5 x/week  to address the above listed problems via gait training, therapeutic activities, therapeutic exercises  Plan of Care expires: 11/18/22  Plan of Care reviewed with: patient     Continue POC per PT order to progress patient toward rehab goals as tolerated by patient.    KATRINA Jolley     11/11/2022

## 2022-11-11 NOTE — PLAN OF CARE
[facility] - Medical Surgical Unit - Skilled Nursing Facility  Patient: Verito Weston     Interdisciplinary Team Meeting     Today's Date: 11/08/2022     Estimated D/C Date: 11/17/22       Physician: Dr. Hyman    Pharmacy: [facility]    : Aviva Bear RN Physical/Occupational Therapy: ELIER Jo PTA   Speech Therapy: na Activity Therapy: watching tv, doing crossword puzzles   Nursing: Kaity Mccabe RN, Priscilla Trinidad, RN, Naheed Georges,Phil Stearns RN, Serafin Christie CNA, Nicholas Romero, monitor tech      Nurse  New Symptoms/Problems: no  Last BM: 11/07/22 Urine: continent Diarrhea: No   Constipated: No Bladder: continent Anderson:no   Isolation: No     O2: yes O2SatW/O2: 97 %    Nutrition: no needsper RDN  Speech/Swallowing: no difficulties  Aspiration Precautions: No  Cognition: a/ox3    Physical Therapy  Physical Therapy/Gait: 180ft ELOS: 21days   Transfers: cga Range of Motion/Restrictions: limited LUE     Occupational Therapy  Occupational Therapy: yes Eating/Grooming: setup for meal tray   Toileting: cga Bathing: mod asst   Dressing (Upper Body): min asst Dressing (Lower Body): mod asst       Tx Plan/Recommendations reviewed with patient.  Additional family Conference/Training: no  D/C Plan/Recommendations: home with home health    Pharmacy  Medication Changes (see MD orders in chart): No  MD/NP: Dr. Hyman Labs Reviewed: yes New Lab Orders: continue to monitor INR mon/thurs     MD/NP Signature: Time:

## 2022-11-11 NOTE — PLAN OF CARE
Swing Bed Recertification    Patient Name: Verito Weston                                                            MRN: [id number]   : 12/15/1930   Diagnosis: Aftercare for healing traumatic fracture of humerus, left [S47.905B]     I certify that continued skilled inpatient care is necessary for the following reasons: Patient requires continued skilled PT and OT at least five days per week due to the patient's functional deficits. Patient requires continued daily skilled nursing care to meet the patient's medical needs, promote recovery, and ensure medical safety.     Estimated discharge date: 2022

## 2022-11-11 NOTE — PROGRESS NOTES
No new wt.  RDN visited pt this a.m.  Pt denied complaints or requests.  Meal intake % meals.  RDN enc pt to voice prefs as needed.

## 2022-11-11 NOTE — PT/OT/SLP PROGRESS
"Physical Therapy  Treatment    Verito Weston   MRN: [id number]   Admitting Diagnosis: Aftercare for healing traumatic fracture of humerus, left    PT Received On: 11/11/22  PT Start Time: 0830     PT Stop Time: 0915    PT Total Time (min): 45 min       Billable Minutes:  Gait Training 12 and Therapeutic Exercise 33    Treatment Type: Treatment  PT/PTA: PTA     PTA Visit Number: 2       General Precautions: Standard, fall  Orthopedic Precautions: LUE non weight bearing   Braces: UE Sling  Respiratory Status: Nasal cannula, flow 2 L/min    Spiritual, Cultural Beliefs, Baptism Practices, Values that Affect Care: no    Subjective:  "I'm not bad today."     Pain/Comfort  Pain Rating 1: 0/10    Objective:   Patient found with: oxygen    Functional Mobility:  Bed Mobility: not performed     Transfers: sit <> stand from dagmar chair with CGA for safety        Gait: 180' with straight  Cane with CGA, frequent cueing to decrease gait speed and improve environmental awareness       Stairs: Not performed    Therapeutic Activities and Exercises:    Ther- Ex    Nustep    Ankle pumps 30 x    Hip adduction 30 x 3"   Heelslides    LAQ's 30 x 1.5# (no weight)   Hamstring Curls 30 x red tband   Quad sets  30 x 3"   SAQ's 30x 3"   Straight Leg Raises 2 x 10 B    Hip abduction 30 x red tband   Horizontal ADD/ABD 30 x each   Glut sets  30 x        Ther-Act    Heel Raises    Mini Squats    3 way hip    Sit <> stands  1 x                      AM-PAC 6 CLICK MOBILITY  How much help from another person does this patient currently need?   1 = Unable, Total/Dependent Assistance  2 = A lot, Maximum/Moderate Assistance  3 = A little, Minimum/Contact Guard/Supervision  4 = None, Modified [place]/Independent    Turning over in bed (including adjusting bedclothes, sheets and blankets)?: 3  Sitting down on and standing up from a chair with arms (e.g., wheelchair, bedside commode, etc.): 3  Moving from lying on back to sitting on the side of the " bed?: 3  Moving to and from a bed to a chair (including a wheelchair)?: 3  Need to walk in hospital room?: 3  Climbing 3-5 steps with a railing?: 2  Basic Mobility Total Score: 17    AM-PAC Raw Score CMS G-Code Modifier Level of Impairment Assistance   6 % Total / Unable   7 - 9 CM 80 - 100% Maximal Assist   10 - 14 CL 60 - 80% Moderate Assist   15 - 19 CK 40 - 60% Moderate Assist   20 - 22 CJ 20 - 40% Minimal Assist   23 CI 1-20% SBA / CGA   24 CH 0% Independent/ Mod I     Patient left up in chair with call button in reach.    Assessment:  Verito Weston is a 89 y.o. female with a medical diagnosis of Aftercare for healing traumatic fracture of humerus, left and presents with muscle weakness, gait instability, decreased UE use, and impaired balance. Patient required frequent cueing during LE exercises to decrease speed and increase movement through full available ROM.  Patient requires occasional tactile cueing and CGA during gait training due to pathway deviations.  Patient reports muscle fatigue without adverse effects at end of PT treatment session.     Rehab identified problem list/impairments: Rehab identified problem list/impairments: weakness, impaired balance, impaired endurance, impaired functional mobility, impaired self care skills, decreased lower extremity function, decreased safety awareness, gait instability    Rehab potential is good.    Activity tolerance: Fair    Discharge recommendations: Discharge Facility/Level of Care Needs: home with home health     Barriers to discharge:      Equipment recommendations: Equipment Needed After Discharge: 3-in-1 commode     GOALS:   Multidisciplinary Problems       Physical Therapy Goals          Problem: Physical Therapy    Goal Priority Disciplines Outcome Goal Variances Interventions   Physical Therapy Goal     PT, PT/OT Ongoing, Progressing     Description: Short Term Goals  1. Patient will complete 30 reps of B LE exercises with correct form.   2.  Patient will complete sit<>stand transfers with SBA.  3. Patient will ambulate 100 feet with hemiwalker on level surfaces with CGA.     Long Term Goals   1. Patient will ambulate 300 feet with hemiwalker on level and unlevel surfaces with SBA.  2. Patient will complete all functional transfers with MOD I.   3. Patient will negotiate up and down 5 stairs with use of handrail with CGA                       PLAN:    Patient to be seen 5 x/week  to address the above listed problems via gait training, therapeutic activities, therapeutic exercises  Plan of Care expires: 11/18/22  Plan of Care reviewed with: patient     Continue POC per PT order to progress patient toward rehab goals as tolerated by patient.    KATRINA Jolley     11/11/2022

## 2022-11-11 NOTE — DISCHARGE SUMMARY
[facility]  Medical Surgical Unit  Hospital Medicine  Discharge Summary      Patient Name: Verito Weston  MRN: [id number]  JAVIER: [id number]  Patient Class: OP- Observation  Admission Date: 10/28/2022  Hospital Length of Stay: 0 days  Discharge Date and Time: 10/28/2022 11:16 AM  Attending Physician: No att. providers found   Discharging Provider: Catrachita Hyman MD  Primary Care Provider: Maricel Avila MD    Primary Care Team: Networked reference to record PCT     HPI:   No notes on file    * No surgery found *      Hospital Course:   10/28/22 - pt. Doing well this AM. She has refused therapy. States that someone in [place] told her not to do therapy for 2 weeks because of broken arm. Will investigate this.       Goals of Care Treatment Preferences:  Code Status: Full Code      Consults:     No new Assessment & Plan notes have been filed under this hospital service since the last note was generated.  Service: Hospital Medicine    Final Active Diagnoses:    Diagnosis Date Noted POA    PRINCIPAL PROBLEM:  Closed fracture of left proximal humerus [S42.202A] 10/27/2022 Yes      Problems Resolved During this Admission:       Discharged Condition: fair    Disposition: Swing Bed    Follow Up:    Patient Instructions:   No discharge procedures on file.    Significant Diagnostic Studies:    Pending Diagnostic Studies:     None         Medications:  Transfer Medications (for Discharge Readmit only):   No current facility-administered medications for this encounter.     No current outpatient medications on file.     Facility-Administered Medications Ordered in Other Encounters   Medication Dose Route Frequency Provider Last Rate Last Admin    acetaminophen tablet 650 mg  650 mg Oral Q6H PRN Catrachita Hyman MD        albuterol-ipratropium 2.5 mg-0.5 mg/3 mL nebulizer solution 3 mL  3 mL Nebulization Q6H PRN Rocky Crowder MD   3 mL at 11/10/22 1925    atorvastatin tablet 10 mg  10 mg Oral  QHS Catrachita Hyman MD   10 mg at 11/10/22 2023    bisacodyL suppository 10 mg  10 mg Rectal Daily PRN Shilo Duarte Rodriguez DO        calcium carbonate 200 mg calcium (500 mg) chewable tablet 500 mg  500 mg Oral BID PRN Catrachita Hyman MD        enoxaparin injection 90 mg  1 mg/kg Subcutaneous Q12H Robinson Singletary MD   90 mg at 11/11/22 1058    fluticasone furoate-vilanteroL 200-25 mcg/dose diskus inhaler 1 puff  1 puff Inhalation Daily Catrachita Hyman MD   1 puff at 11/11/22 0826    hydroCHLOROthiazide tablet 12.5 mg  12.5 mg Oral Daily Catrachita Hyman MD   12.5 mg at 11/11/22 0826    lifitegrast 5 % Dpet 1 drop  1 drop Ophthalmic BID Catrachita Hyman MD   1 drop at 11/09/22 0831    losartan tablet 25 mg  25 mg Oral Daily Catrachita Hyman MD   25 mg at 11/11/22 0826    meclizine tablet 25 mg  25 mg Oral TID PRN Catrachita Hyman MD        melatonin tablet 6 mg  6 mg Oral Nightly PRN Catrachita Hyman MD        oxyCODONE-acetaminophen  mg per tablet 1 tablet  1 tablet Oral Q4H PRN Bianca Crwoley MD   1 tablet at 11/11/22 0838    paroxetine tablet 20 mg  20 mg Oral Daily Catrachita Hyman MD   20 mg at 11/11/22 0826    polyethylene glycol packet 17 g  17 g Oral BID PRN Rocky Crowder MD   17 g at 11/01/22 0818    senna-docusate 8.6-50 mg per tablet 1 tablet  1 tablet Oral BID Catrachita Hyman MD   1 tablet at 11/11/22 0826    warfarin (COUMADIN) tablet 2.5 mg  2.5 mg Oral Every Tues, Thurs, Sat, Sun Catrachita Hyman MD   2.5 mg at 11/10/22 1651    warfarin (COUMADIN) tablet 5 mg  5 mg Oral Every Mon, Wed, Fri Catrachita Hyman MD   5 mg at 11/09/22 1702       Indwelling Lines/Drains at time of discharge:   Lines/Drains/Airways     None                 Time spent on the discharge of patient: 30 minutes         Catrachita Hyman MD  Department of Hospital Medicine  [facility] - Medical Surgical  Unit

## 2022-11-11 NOTE — SUBJECTIVE & OBJECTIVE
Interval History: doing well.    Review of Systems  Objective:     Vital Signs (Most Recent):  Temp: 97.9 °F (36.6 °C) (11/11/22 0701)  Pulse: 75 (11/11/22 0701)  Resp: 19 (11/11/22 0701)  BP: 137/74 (11/11/22 0701)  SpO2: 99 % (11/11/22 0701) Vital Signs (24h Range):  Temp:  [97.9 °F (36.6 °C)-98.7 °F (37.1 °C)] 97.9 °F (36.6 °C)  Pulse:  [75-92] 75  Resp:  [18-20] 19  SpO2:  [97 %-99 %] 99 %  BP: (116-137)/(60-74) 137/74     Weight: 87 kg (191 lb 12.8 oz)  Body mass index is 29.16 kg/m².  No intake or output data in the 24 hours ending 11/11/22 0824   Physical Exam    Significant Labs: All pertinent labs within the past 24 hours have been reviewed.    Significant Imaging: I have reviewed all pertinent imaging results/findings within the past 24 hours.

## 2022-11-11 NOTE — PT/OT/SLP PROGRESS
"Occupational Therapy  Treatment    Verito Weston   MRN: [id number]   Admitting Diagnosis: Aftercare for healing traumatic fracture of humerus, left    OT Date of Treatment: 11/11/22   OT Start Time: 0941  OT Stop Time: 1014  OT Total Time (min): 33 min    Billable Minutes:  Therapeutic Exercise 33 min               General Precautions: Standard, fall  Orthopedic Precautions: LUE non weight bearing  Braces: UE Sling  Respiratory Status: Nasal cannula, flow 2 L/min         Subjective:  Communicated with RN prior to session.        Pain/Comfort  Pain Rating 1: 0/10    Objective:  Patient found with: oxygen     Functional Mobility:  Bed Mobility:       Transfers:        Functional Ambulation:     Activities of Daily Living:      Therapeutic Activities and Exercises:  Patient completed the following for increased strength to increase I with ADLs: 1# DB RUE- shoulder press, chest press, bicep curls x 30, red theraband- tricep extension RUE x 30; PROM to left elbow flex/ext.; UBE 6 min; PROM to LUE elbow and sh flexion to tolerance      AM-PAC 6 CLICK ADL   How much help from another person does this patient currently need?   1 = Unable, Total/Dependent Assistance  2 = A lot, Maximum/Moderate Assistance  3 = A little, Minimum/Contact Guard/Supervision  4 = None, Modified [place]/Independent    Putting on and taking off regular lower body clothing? : 2  Bathing (including washing, rinsing, drying)?: 2  Toileting, which includes using toilet, bedpan, or urinal? : 4  Putting on and taking off regular upper body clothing?: 3  Taking care of personal grooming such as brushing teeth?: 3  Eating meals?: 4  Daily Activity Total Score: 18     AM-PAC Raw Score CMS "G-Code Modifier Level of Impairment Assistance   6 % Total / Unable   7 - 8 CM 80 - 100% Maximal Assist   9-13 CL 60 - 80% Moderate Assist   14 - 19 CK 40 - 60% Moderate Assist   20 - 22 CJ 20 - 40% Minimal Assist   23 CI 1-20% SBA / CGA   24 CH 0% " Independent/ Mod I       Patient left up in chair with call button in reach    ASSESSMENT:  Verito Weston is a 89 y.o. female with a medical diagnosis of Aftercare for healing traumatic fracture of humerus, left and presents with decreased strength, decreased endurance, decreased self-care.    Rehab identified problem list/impairments: Rehab identified problem list/impairments: weakness, impaired endurance, impaired self care skills, impaired functional mobility, impaired balance, decreased lower extremity function, decreased safety awareness, decreased upper extremity function    Rehab potential is good.    Activity tolerance: Good    Discharge recommendations: Discharge Facility/Level of Care Needs: home with home health     Barriers to discharge:      Equipment recommendations: 3-in-1 commode     GOALS:   Multidisciplinary Problems       Occupational Therapy Goals          Problem: Occupational Therapy    Goal Priority Disciplines Outcome Interventions   Occupational Therapy Goal     OT, PT/OT Ongoing, Progressing    Description: Description: Grooming Status:   Short Term Goal: Pt will perform grooming with min a sitting EOB.   Long Term Goal: Pt will perform grooming/oral hygiene standing at sink with s/u      LE dressing Status:   Short Term Goal: Pt will perform LE dressing with min a.   Long Term Goal: Pt will perform LE dressing with s/u.    Toileting Status:   Short Term Goal: Pt will perform toilet hygiene on BSC with min a.  Long Term Goal: Pt will perform toilet hygiene on toilet with no AE with s/u.    Commode Transfer:   Short Term Goal: Pt will perform BSC t/f with s/u.  Long Term Goal:  Pt will perform toilet t/f in bathroom with mod I.     Bathing Status:   Long Term Goal: Pt will perform sponge bath with s/u with no unsafe fatigue.     Strength Status:   Long Term Goal: Pt to perform BUE strengthening with weights and/or body weight to increase ADL independence and safety    Endurance Status:    Short Term Goal:pt to perform 15 min OT treatment with 5 or greater rest breaks  Long Term Goal: pt to perform 30 min OT treat with 3 or less rest breaks                       Plan:  Patient to be seen 5 x/week to address the above listed problems via therapeutic exercises  Plan of Care expires:    Plan of Care reviewed with: patient       Leann Rowley, OTR/L    11/11/2022

## 2022-11-11 NOTE — PLAN OF CARE
Problem: Physical Therapy  Goal: Physical Therapy Goal  Description: Short Term Goals  1. Patient will complete 30 reps of B LE exercises with correct form.   2. Patient will complete sit<>stand transfers with SBA.  3. Patient will ambulate 100 feet with hemiwalker on level surfaces with CGA.     Long Term Goals   1. Patient will ambulate 300 feet with hemiwalker on level and unlevel surfaces with SBA.  2. Patient will complete all functional transfers with MOD I.   3. Patient will negotiate up and down 5 stairs with use of handrail with CGA  Outcome: Ongoing, Progressing   Continue POC per PT order to progress patient toward rehab goals as tolerated by patient.  11/11/2022

## 2022-11-11 NOTE — PROGRESS NOTES
[facility] - Medical Surgical [facility] Medicine  Progress Note    Patient Name: Verito Weston  MRN: [id number]  Patient Class: IP- Swing   Admission Date: 10/28/2022  Length of Stay: 14 days  Attending Physician: Catrachita Hyman, *  Primary Care Provider: Maricel Avila MD        Subjective:     Principal Problem:Aftercare for healing traumatic fracture of humerus, left        HPI:  This 89 yr. Old WF has been switched to Swing Bed status. See H7P done by Dr. DEVIN Singletary      Overview/Hospital Course:  10/31/22 - pt. Doing well this AM. No new complaints.    11/7/22 - pt. Doing well. Ready for therapy.    11/11/22 - doing well. Will continue present treatment.      Interval History: doing well.    Review of Systems  Objective:     Vital Signs (Most Recent):  Temp: 97.9 °F (36.6 °C) (11/11/22 0701)  Pulse: 75 (11/11/22 0701)  Resp: 19 (11/11/22 0701)  BP: 137/74 (11/11/22 0701)  SpO2: 99 % (11/11/22 0701) Vital Signs (24h Range):  Temp:  [97.9 °F (36.6 °C)-98.7 °F (37.1 °C)] 97.9 °F (36.6 °C)  Pulse:  [75-92] 75  Resp:  [18-20] 19  SpO2:  [97 %-99 %] 99 %  BP: (116-137)/(60-74) 137/74     Weight: 87 kg (191 lb 12.8 oz)  Body mass index is 29.16 kg/m².  No intake or output data in the 24 hours ending 11/11/22 0824   Physical Exam    Significant Labs: All pertinent labs within the past 24 hours have been reviewed.    Significant Imaging: I have reviewed all pertinent imaging results/findings within the past 24 hours.      Assessment/Plan:      Dizziness          VTE Risk Mitigation (From admission, onward)         Ordered     enoxaparin injection 90 mg  Every 12 hours (non-standard times)         11/03/22 0922     warfarin (COUMADIN) tablet 2.5 mg  Every Tues, Thurs, Sat, Sun         10/31/22 0817     warfarin (COUMADIN) tablet 5 mg  Every Mon, Wed, Fri        Question:  Is the patient competent?  Answer:  Yes    10/28/22 1401                Discharge Planning   TREASURE: 11/17/2022     Code  Status: Full Code   Is the patient medically ready for discharge?:     Reason for patient still in hospital (select all that apply): Patient trending condition  Discharge Plan A: Home Health                  Catrachita Hyman MD  Department of Hospital Medicine   [facility] - Medical Surgical [facility]

## 2022-11-11 NOTE — PLAN OF CARE
Problem: Fall Injury Risk  Goal: Absence of Fall and Fall-Related Injury  Outcome: Ongoing, Progressing  Intervention: Identify and Manage Contributors  Flowsheets (Taken 11/11/2022 1701)  Self-Care Promotion: independence encouraged  Medication Review/Management: medications reviewed  Intervention: Promote Injury-Free Environment  Flowsheets (Taken 11/11/2022 1701)  Safety Promotion/Fall Prevention: assistive device/personal item within reach     Problem: Skin Injury Risk Increased  Goal: Skin Health and Integrity  Outcome: Ongoing, Progressing  Intervention: Optimize Skin Protection  Flowsheets (Taken 11/11/2022 1701)  Pressure Reduction Techniques: frequent weight shift encouraged  Pressure Reduction Devices: feet on footrest/footstool  Skin Protection:   adhesive use limited   drying agents applied  Head of Bed (HOB) Positioning: HOB elevated  Intervention: Promote and Optimize Oral Intake  Flowsheets (Taken 11/11/2022 1701)  Oral Nutrition Promotion: calorie-dense foods provided

## 2022-11-12 PROCEDURE — 94640 AIRWAY INHALATION TREATMENT: CPT

## 2022-11-12 PROCEDURE — 11000004 HC SNF PRIVATE

## 2022-11-12 PROCEDURE — 94761 N-INVAS EAR/PLS OXIMETRY MLT: CPT

## 2022-11-12 PROCEDURE — 63600175 PHARM REV CODE 636 W HCPCS: Performed by: INTERNAL MEDICINE

## 2022-11-12 PROCEDURE — 25000003 PHARM REV CODE 250: Performed by: SPECIALIST

## 2022-11-12 PROCEDURE — 27000221 HC OXYGEN, UP TO 24 HOURS

## 2022-11-12 PROCEDURE — 25000242 PHARM REV CODE 250 ALT 637 W/ HCPCS: Performed by: FAMILY MEDICINE

## 2022-11-12 PROCEDURE — 99900035 HC TECH TIME PER 15 MIN (STAT)

## 2022-11-12 PROCEDURE — 25000003 PHARM REV CODE 250: Performed by: FAMILY MEDICINE

## 2022-11-12 RX ADMIN — ENOXAPARIN SODIUM 90 MG: 100 INJECTION SUBCUTANEOUS at 10:11

## 2022-11-12 RX ADMIN — SENNOSIDES AND DOCUSATE SODIUM 1 TABLET: 50; 8.6 TABLET ORAL at 08:11

## 2022-11-12 RX ADMIN — OXYCODONE HYDROCHLORIDE AND ACETAMINOPHEN 1 TABLET: 10; 325 TABLET ORAL at 08:11

## 2022-11-12 RX ADMIN — HYDROCHLOROTHIAZIDE 12.5 MG: 12.5 TABLET ORAL at 08:11

## 2022-11-12 RX ADMIN — PAROXETINE 20 MG: 10 TABLET, FILM COATED ORAL at 08:11

## 2022-11-12 RX ADMIN — LOSARTAN POTASSIUM 25 MG: 25 TABLET, FILM COATED ORAL at 08:11

## 2022-11-12 RX ADMIN — ATORVASTATIN CALCIUM 10 MG: 10 TABLET, FILM COATED ORAL at 08:11

## 2022-11-12 RX ADMIN — IPRATROPIUM BROMIDE AND ALBUTEROL SULFATE 3 ML: 2.5; .5 SOLUTION RESPIRATORY (INHALATION) at 06:11

## 2022-11-12 RX ADMIN — WARFARIN SODIUM 2.5 MG: 2.5 TABLET ORAL at 05:11

## 2022-11-12 RX ADMIN — FLUTICASONE FUROATE AND VILANTEROL TRIFENATATE 1 PUFF: 200; 25 POWDER RESPIRATORY (INHALATION) at 09:11

## 2022-11-12 NOTE — NURSING
Rec'd pt resting in bed watching TV. Pt denies pain/SOB. 2L nasal cannula intact. LUE immobilized in sling. All safety measures met at this time.

## 2022-11-13 PROCEDURE — 99900035 HC TECH TIME PER 15 MIN (STAT)

## 2022-11-13 PROCEDURE — 11000004 HC SNF PRIVATE

## 2022-11-13 PROCEDURE — 94640 AIRWAY INHALATION TREATMENT: CPT

## 2022-11-13 PROCEDURE — 25000003 PHARM REV CODE 250: Performed by: SPECIALIST

## 2022-11-13 PROCEDURE — 63600175 PHARM REV CODE 636 W HCPCS: Performed by: INTERNAL MEDICINE

## 2022-11-13 PROCEDURE — 27000221 HC OXYGEN, UP TO 24 HOURS

## 2022-11-13 PROCEDURE — 25000003 PHARM REV CODE 250: Performed by: FAMILY MEDICINE

## 2022-11-13 PROCEDURE — 94761 N-INVAS EAR/PLS OXIMETRY MLT: CPT

## 2022-11-13 PROCEDURE — 25000242 PHARM REV CODE 250 ALT 637 W/ HCPCS: Performed by: FAMILY MEDICINE

## 2022-11-13 RX ADMIN — SENNOSIDES AND DOCUSATE SODIUM 1 TABLET: 50; 8.6 TABLET ORAL at 08:11

## 2022-11-13 RX ADMIN — FLUTICASONE FUROATE AND VILANTEROL TRIFENATATE 1 PUFF: 200; 25 POWDER RESPIRATORY (INHALATION) at 08:11

## 2022-11-13 RX ADMIN — ATORVASTATIN CALCIUM 10 MG: 10 TABLET, FILM COATED ORAL at 08:11

## 2022-11-13 RX ADMIN — PAROXETINE 20 MG: 10 TABLET, FILM COATED ORAL at 08:11

## 2022-11-13 RX ADMIN — ENOXAPARIN SODIUM 90 MG: 100 INJECTION SUBCUTANEOUS at 10:11

## 2022-11-13 RX ADMIN — OXYCODONE HYDROCHLORIDE AND ACETAMINOPHEN 1 TABLET: 10; 325 TABLET ORAL at 08:11

## 2022-11-13 RX ADMIN — HYDROCHLOROTHIAZIDE 12.5 MG: 12.5 TABLET ORAL at 08:11

## 2022-11-13 RX ADMIN — WARFARIN SODIUM 2.5 MG: 2.5 TABLET ORAL at 04:11

## 2022-11-13 RX ADMIN — OXYCODONE HYDROCHLORIDE AND ACETAMINOPHEN 1 TABLET: 10; 325 TABLET ORAL at 04:11

## 2022-11-13 RX ADMIN — LOSARTAN POTASSIUM 25 MG: 25 TABLET, FILM COATED ORAL at 08:11

## 2022-11-13 RX ADMIN — IPRATROPIUM BROMIDE AND ALBUTEROL SULFATE 3 ML: 2.5; .5 SOLUTION RESPIRATORY (INHALATION) at 06:11

## 2022-11-13 NOTE — PLAN OF CARE
Problem: Fall Injury Risk  Goal: Absence of Fall and Fall-Related Injury  Outcome: Ongoing, Progressing  Intervention: Identify and Manage Contributors  Flowsheets (Taken 11/13/2022 1730)  Self-Care Promotion: independence encouraged  Medication Review/Management: medications reviewed  Intervention: Promote Injury-Free Environment  Flowsheets (Taken 11/13/2022 1730)  Safety Promotion/Fall Prevention: assistive device/personal item within reach     Problem: Skin Injury Risk Increased  Goal: Skin Health and Integrity  Outcome: Ongoing, Progressing  Intervention: Optimize Skin Protection  Flowsheets (Taken 11/13/2022 1730)  Pressure Reduction Techniques: frequent weight shift encouraged  Pressure Reduction Devices: feet on footrest/footstool  Skin Protection:   adhesive use limited   drying agents applied  Head of Bed (HOB) Positioning: HOB elevated  Intervention: Promote and Optimize Oral Intake  Flowsheets (Taken 11/13/2022 1730)  Oral Nutrition Promotion: calorie-dense foods provided

## 2022-11-13 NOTE — PLAN OF CARE
Problem: Adult Inpatient Plan of Care  Goal: Plan of Care Review  Outcome: Ongoing, Progressing  Goal: Patient-Specific Goal (Individualized)  Outcome: Ongoing, Progressing  Goal: Absence of Hospital-Acquired Illness or Injury  Outcome: Ongoing, Progressing  Goal: Optimal Comfort and Wellbeing  Outcome: Ongoing, Progressing  Goal: Readiness for Transition of Care  Outcome: Ongoing, Progressing     Problem: Skin Injury Risk Increased  Goal: Skin Health and Integrity  Outcome: Ongoing, Progressing  Intervention: Optimize Skin Protection  Flowsheets (Taken 11/13/2022 1112)  Head of Bed (HOB) Positioning: HOB at 20-30 degrees     Problem: Fall Injury Risk  Goal: Absence of Fall and Fall-Related Injury  Intervention: Identify and Manage Contributors  Flowsheets (Taken 11/13/2022 4140)  Self-Care Promotion: safe use of adaptive equipment encouraged  Medication Review/Management: medications reviewed

## 2022-11-14 LAB
INR BLD: 1.65
PROTHROMBIN TIME: 19.4 SECONDS (ref 11.7–14.7)

## 2022-11-14 PROCEDURE — 94640 AIRWAY INHALATION TREATMENT: CPT

## 2022-11-14 PROCEDURE — 36415 COLL VENOUS BLD VENIPUNCTURE: CPT | Performed by: FAMILY MEDICINE

## 2022-11-14 PROCEDURE — 99308 PR NURSING FAC CARE, SUBSEQ, MINOR COMPLIC: ICD-10-PCS | Mod: ,,, | Performed by: FAMILY MEDICINE

## 2022-11-14 PROCEDURE — 99308 SBSQ NF CARE LOW MDM 20: CPT | Mod: ,,, | Performed by: FAMILY MEDICINE

## 2022-11-14 PROCEDURE — 94761 N-INVAS EAR/PLS OXIMETRY MLT: CPT

## 2022-11-14 PROCEDURE — 63600175 PHARM REV CODE 636 W HCPCS: Performed by: INTERNAL MEDICINE

## 2022-11-14 PROCEDURE — 25000003 PHARM REV CODE 250: Performed by: SPECIALIST

## 2022-11-14 PROCEDURE — 85610 PROTHROMBIN TIME: CPT | Performed by: FAMILY MEDICINE

## 2022-11-14 PROCEDURE — 11000004 HC SNF PRIVATE

## 2022-11-14 PROCEDURE — 27000221 HC OXYGEN, UP TO 24 HOURS

## 2022-11-14 PROCEDURE — 25000003 PHARM REV CODE 250: Performed by: FAMILY MEDICINE

## 2022-11-14 PROCEDURE — 97110 THERAPEUTIC EXERCISES: CPT

## 2022-11-14 PROCEDURE — 25000242 PHARM REV CODE 250 ALT 637 W/ HCPCS: Performed by: FAMILY MEDICINE

## 2022-11-14 PROCEDURE — 97110 THERAPEUTIC EXERCISES: CPT | Mod: CQ

## 2022-11-14 PROCEDURE — 63600175 PHARM REV CODE 636 W HCPCS: Performed by: FAMILY MEDICINE

## 2022-11-14 PROCEDURE — 97116 GAIT TRAINING THERAPY: CPT | Mod: CQ

## 2022-11-14 RX ORDER — WARFARIN SODIUM 5 MG/1
5 TABLET ORAL DAILY
Status: DISCONTINUED | OUTPATIENT
Start: 2022-11-14 | End: 2022-11-17 | Stop reason: HOSPADM

## 2022-11-14 RX ORDER — KETOROLAC TROMETHAMINE 30 MG/ML
60 INJECTION, SOLUTION INTRAMUSCULAR; INTRAVENOUS ONCE
Status: COMPLETED | OUTPATIENT
Start: 2022-11-14 | End: 2022-11-14

## 2022-11-14 RX ADMIN — HYDROCHLOROTHIAZIDE 12.5 MG: 12.5 TABLET ORAL at 08:11

## 2022-11-14 RX ADMIN — ENOXAPARIN SODIUM 90 MG: 100 INJECTION SUBCUTANEOUS at 10:11

## 2022-11-14 RX ADMIN — PAROXETINE 20 MG: 10 TABLET, FILM COATED ORAL at 08:11

## 2022-11-14 RX ADMIN — IPRATROPIUM BROMIDE AND ALBUTEROL SULFATE 3 ML: 2.5; .5 SOLUTION RESPIRATORY (INHALATION) at 07:11

## 2022-11-14 RX ADMIN — SENNOSIDES AND DOCUSATE SODIUM 1 TABLET: 50; 8.6 TABLET ORAL at 08:11

## 2022-11-14 RX ADMIN — OXYCODONE HYDROCHLORIDE AND ACETAMINOPHEN 1 TABLET: 10; 325 TABLET ORAL at 06:11

## 2022-11-14 RX ADMIN — WARFARIN SODIUM 5 MG: 5 TABLET ORAL at 04:11

## 2022-11-14 RX ADMIN — OXYCODONE HYDROCHLORIDE AND ACETAMINOPHEN 1 TABLET: 10; 325 TABLET ORAL at 08:11

## 2022-11-14 RX ADMIN — FLUTICASONE FUROATE AND VILANTEROL TRIFENATATE 1 PUFF: 200; 25 POWDER RESPIRATORY (INHALATION) at 08:11

## 2022-11-14 RX ADMIN — ATORVASTATIN CALCIUM 10 MG: 10 TABLET, FILM COATED ORAL at 08:11

## 2022-11-14 RX ADMIN — LOSARTAN POTASSIUM 25 MG: 25 TABLET, FILM COATED ORAL at 08:11

## 2022-11-14 RX ADMIN — KETOROLAC TROMETHAMINE 60 MG: 30 INJECTION, SOLUTION INTRAMUSCULAR at 10:11

## 2022-11-14 NOTE — SUBJECTIVE & OBJECTIVE
Interval History: doing well. Will continue present treatment.    Review of Systems  Objective:     Vital Signs (Most Recent):  Temp: 98.8 °F (37.1 °C) (11/13/22 2001)  Pulse: 78 (11/14/22 0801)  Resp: 18 (11/14/22 0801)  BP: (!) 129/58 (11/13/22 2001)  SpO2: 95 % (11/14/22 0801)   Vital Signs (24h Range):  Temp:  [98.8 °F (37.1 °C)] 98.8 °F (37.1 °C)  Pulse:  [75-84] 78  Resp:  [18-20] 18  SpO2:  [95 %-99 %] 95 %  BP: (129)/(58) 129/58     Weight: 86 kg (189 lb 9.5 oz)  Body mass index is 28.83 kg/m².  No intake or output data in the 24 hours ending 11/14/22 0805   Physical Exam    Significant Labs: All pertinent labs within the past 24 hours have been reviewed.    Significant Imaging: I have reviewed all pertinent imaging results/findings within the past 24 hours.

## 2022-11-14 NOTE — PROGRESS NOTES
[facility] Medicine  Progress Note    Patient Name: Verito Weston  MRN: [id number]  Patient Class: IP- Swing   Admission Date: 10/28/2022  Length of Stay: 17 days  Attending Physician: Catrachita Hyman, *  Primary Care Provider: Maricel Avila MD        Subjective:     Principal Problem:Aftercare for healing traumatic fracture of humerus, left        HPI:  This 89 yr. Old WF has been switched to Swing Bed status. See H7P done by Dr. DEVIN Singletary      Overview/Hospital Course:  10/31/22 - pt. Doing well this AM. No new complaints.    11/7/22 - pt. Doing well. Ready for therapy.    11/11/22 - doing well. Will continue present treatment.    11/14/22 - doing well. No complaints voiced.      Interval History: doing well. Will continue present treatment.    Review of Systems  Objective:     Vital Signs (Most Recent):  Temp: 98.8 °F (37.1 °C) (11/13/22 2001)  Pulse: 78 (11/14/22 0801)  Resp: 18 (11/14/22 0801)  BP: (!) 129/58 (11/13/22 2001)  SpO2: 95 % (11/14/22 0801)   Vital Signs (24h Range):  Temp:  [98.8 °F (37.1 °C)] 98.8 °F (37.1 °C)  Pulse:  [75-84] 78  Resp:  [18-20] 18  SpO2:  [95 %-99 %] 95 %  BP: (129)/(58) 129/58     Weight: 86 kg (189 lb 9.5 oz)  Body mass index is 28.83 kg/m².  No intake or output data in the 24 hours ending 11/14/22 0805   Physical Exam    Significant Labs: All pertinent labs within the past 24 hours have been reviewed.    Significant Imaging: I have reviewed all pertinent imaging results/findings within the past 24 hours.      Assessment/Plan:      Dizziness          VTE Risk Mitigation (From admission, onward)         Ordered     enoxaparin injection 90 mg  Every 12 hours (non-standard times)         11/03/22 0922     warfarin (COUMADIN) tablet 2.5 mg  Every Tues, Thurs, Sat, Sun         10/31/22 0817     warfarin (COUMADIN) tablet 5 mg  Every Mon, Wed, Fri        Question:  Is the patient competent?  Answer:  Yes    10/28/22 1409                 Discharge Planning   TREASURE: 11/17/2022     Code Status: Full Code   Is the patient medically ready for discharge?:     Reason for patient still in hospital (select all that apply): Patient trending condition  Discharge Plan A: Home Health                  Catrachita Hyman MD  Department of Hospital Medicine   [facility]

## 2022-11-14 NOTE — PT/OT/SLP PROGRESS
"Physical Therapy  Treatment    Verito Weston   MRN: [id number]   Admitting Diagnosis: Aftercare for healing traumatic fracture of humerus, left    PT Received On: 11/14/22  PT Start Time: 0907     PT Stop Time: 0942    PT Total Time (min): 35 min       Billable Minutes:  Gait Training 10 and Therapeutic Exercise 14 , Therapeutic Activity 11    Treatment Type: Treatment  PT/PTA: PTA     PTA Visit Number: 2       General Precautions: Standard, fall  Orthopedic Precautions: LUE non weight bearing   Braces: UE Sling  Respiratory Status: Nasal cannula, flow 2 L/min    Spiritual, Cultural Beliefs, Jew Practices, Values that Affect Care: no    Subjective:  Pt reports feeling well and is agreeable to PT tx.       Pain/Comfort  Pain Rating 1: 0/10    Objective:   Patient found with: oxygen    Functional Mobility:  Bed Mobility: not performed     Transfers: sit <> stand from dagmar chair with CGA for safety        Gait: 180' with straight  Cane with CGA, frequent cueing to decrease gait speed and for proper cane placement to increase MILES        Stairs: Not performed    Therapeutic Activities and Exercises:    Ther- Ex    Nustep    Ankle pumps 30 x    Hip adduction 30 x 3"   Heelslides    LAQ's 30 x 1.5# (no weight)   Hamstring Curls 30 x red tband   Quad sets  30 x 3"   SAQ's 30x 3"   Straight Leg Raises 3 x 10 min A   Hip abduction 30 x red tband   Horizontal ADD/ABD 30 x each   Glut sets  30 x        Ther-Act    Heel Raises    Mini Squats    3 way hip    Sit <> stands  2 x                      AM-PAC 6 CLICK MOBILITY  How much help from another person does this patient currently need?   1 = Unable, Total/Dependent Assistance  2 = A lot, Maximum/Moderate Assistance  3 = A little, Minimum/Contact Guard/Supervision  4 = None, Modified [place]/Independent    Turning over in bed (including adjusting bedclothes, sheets and blankets)?: 3  Sitting down on and standing up from a chair with arms (e.g., wheelchair, bedside " commode, etc.): 3  Moving from lying on back to sitting on the side of the bed?: 3  Moving to and from a bed to a chair (including a wheelchair)?: 3  Need to walk in hospital room?: 3  Climbing 3-5 steps with a railing?: 3  Basic Mobility Total Score: 18    AM-PAC Raw Score CMS G-Code Modifier Level of Impairment Assistance   6 % Total / Unable   7 - 9 CM 80 - 100% Maximal Assist   10 - 14 CL 60 - 80% Moderate Assist   15 - 19 CK 40 - 60% Moderate Assist   20 - 22 CJ 20 - 40% Minimal Assist   23 CI 1-20% SBA / CGA   24 CH 0% Independent/ Mod I     Patient left up in chair with call button in reach.    Assessment:  Verito Weston is a 89 y.o. female with a medical diagnosis of Aftercare for healing traumatic fracture of humerus, left and presents with muscle weakness, gait instability, decreased UE use, and impaired balance. Pt required mod cueing during LE exercises to decrease speed and increase movement through full available ROM.  Pt displayed increased strength and motivation throughout tx. After minimal cueing, pt ambulated with better cane placement to increase MILES.  Upon completion of tx session, pt returned to room and left in dagmar-chair with O2 donned at 2L/min and call bell handy.    Rehab identified problem list/impairments: Rehab identified problem list/impairments: weakness, impaired endurance, impaired balance, impaired functional mobility, impaired self care skills, decreased lower extremity function, decreased safety awareness, gait instability    Rehab potential is good.    Activity tolerance: Fair    Discharge recommendations: Discharge Facility/Level of Care Needs: home with home health     Barriers to discharge:      Equipment recommendations: Equipment Needed After Discharge: 3-in-1 commode     GOALS:   Multidisciplinary Problems       Physical Therapy Goals          Problem: Physical Therapy    Goal Priority Disciplines Outcome Goal Variances Interventions   Physical Therapy Goal      PT, PT/OT Ongoing, Progressing     Description: Short Term Goals  1. Patient will complete 30 reps of B LE exercises with correct form.   2. Patient will complete sit<>stand transfers with SBA.  3. Patient will ambulate 100 feet with hemiwalker on level surfaces with CGA.     Long Term Goals   1. Patient will ambulate 300 feet with hemiwalker on level and unlevel surfaces with SBA.  2. Patient will complete all functional transfers with MOD I.   3. Patient will negotiate up and down 5 stairs with use of handrail with CGA                       PLAN:    Patient to be seen 5 x/week  to address the above listed problems via gait training, therapeutic activities, therapeutic exercises  Plan of Care expires: 11/18/22  Plan of Care reviewed with: patient     Continue POC per PT order to progress patient toward rehab goals as tolerated by patient.    KATRINA Gerardo   11/14/2022

## 2022-11-14 NOTE — PT/OT/SLP PROGRESS
"Occupational Therapy  Treatment    Verito Weston   MRN: [id number]   Admitting Diagnosis: Aftercare for healing traumatic fracture of humerus, left    OT Date of Treatment: 11/14/22   OT Start Time: 0824  OT Stop Time: 0850  OT Total Time (min): 26 min    Billable Minutes:  Therapeutic Exercise 26 min               General Precautions: Standard, fall  Orthopedic Precautions: LUE non weight bearing  Braces: UE Sling  Respiratory Status: Nasal cannula, flow 2 L/min         Subjective:  Communicated with RN prior to session.        Pain/Comfort  Pain Rating 1: 0/10    Objective:  Patient found with: oxygen     Functional Mobility:  Bed Mobility:       Transfers:        Functional Ambulation:     Activities of Daily Living:      Therapeutic Activities and Exercises:  Patient completed the following for increased strength to increase I with ADLs: 1# DB RUE- shoulder press, chest press, bicep curls x 30, red theraband- tricep extension RUE x 30;  UBE 6 min;     AM-PAC 6 CLICK ADL   How much help from another person does this patient currently need?   1 = Unable, Total/Dependent Assistance  2 = A lot, Maximum/Moderate Assistance  3 = A little, Minimum/Contact Guard/Supervision  4 = None, Modified [place]/Independent    Putting on and taking off regular lower body clothing? : 2  Bathing (including washing, rinsing, drying)?: 2  Toileting, which includes using toilet, bedpan, or urinal? : 4  Putting on and taking off regular upper body clothing?: 3  Taking care of personal grooming such as brushing teeth?: 3  Eating meals?: 4  Daily Activity Total Score: 18     AM-PAC Raw Score CMS "G-Code Modifier Level of Impairment Assistance   6 % Total / Unable   7 - 8 CM 80 - 100% Maximal Assist   9-13 CL 60 - 80% Moderate Assist   14 - 19 CK 40 - 60% Moderate Assist   20 - 22 CJ 20 - 40% Minimal Assist   23 CI 1-20% SBA / CGA   24 CH 0% Independent/ Mod I       Patient left up in chair with  PT " notified    ASSESSMENT:  Verito Weston is a 89 y.o. female with a medical diagnosis of Aftercare for healing traumatic fracture of humerus, left and presents with decreased strength, decreased endurance, decreased self-care.    Rehab identified problem list/impairments: Rehab identified problem list/impairments: weakness, impaired endurance, impaired self care skills, impaired functional mobility, impaired balance, decreased upper extremity function, decreased lower extremity function, decreased safety awareness    Rehab potential is good.    Activity tolerance: Good    Discharge recommendations: Discharge Facility/Level of Care Needs: home with home health     Barriers to discharge:      Equipment recommendations: 3-in-1 commode     GOALS:   Multidisciplinary Problems       Occupational Therapy Goals          Problem: Occupational Therapy    Goal Priority Disciplines Outcome Interventions   Occupational Therapy Goal     OT, PT/OT Ongoing, Progressing    Description: Description: Grooming Status:   Short Term Goal: Pt will perform grooming with min a sitting EOB.   Long Term Goal: Pt will perform grooming/oral hygiene standing at sink with s/u      LE dressing Status:   Short Term Goal: Pt will perform LE dressing with min a.   Long Term Goal: Pt will perform LE dressing with s/u.    Toileting Status:   Short Term Goal: Pt will perform toilet hygiene on BSC with min a.  Long Term Goal: Pt will perform toilet hygiene on toilet with no AE with s/u.    Commode Transfer:   Short Term Goal: Pt will perform BSC t/f with s/u.  Long Term Goal:  Pt will perform toilet t/f in bathroom with mod I.     Bathing Status:   Long Term Goal: Pt will perform sponge bath with s/u with no unsafe fatigue.     Strength Status:   Long Term Goal: Pt to perform BUE strengthening with weights and/or body weight to increase ADL independence and safety    Endurance Status:   Short Term Goal:pt to perform 15 min OT treatment with 5 or greater  rest breaks  Long Term Goal: pt to perform 30 min OT treat with 3 or less rest breaks                       Plan:  Patient to be seen 5 x/week to address the above listed problems via therapeutic exercises  Plan of Care expires:    Plan of Care reviewed with: patient       Leann Amrik, OTR/L    11/14/2022

## 2022-11-14 NOTE — PLAN OF CARE
Problem: Adult Inpatient Plan of Care  Goal: Plan of Care Review  Outcome: Ongoing, Progressing  Flowsheets (Taken 11/14/2022 1611)  Plan of Care Reviewed With: patient  Goal: Optimal Comfort and Wellbeing  Outcome: Ongoing, Progressing  Goal: Readiness for Transition of Care  Outcome: Ongoing, Progressing     Problem: Fall Injury Risk  Goal: Absence of Fall and Fall-Related Injury  Outcome: Ongoing, Progressing     Problem: Skin Injury Risk Increased  Goal: Skin Health and Integrity  Outcome: Ongoing, Progressing

## 2022-11-15 PROCEDURE — 94640 AIRWAY INHALATION TREATMENT: CPT

## 2022-11-15 PROCEDURE — 11000004 HC SNF PRIVATE

## 2022-11-15 PROCEDURE — 99900035 HC TECH TIME PER 15 MIN (STAT)

## 2022-11-15 PROCEDURE — 27000221 HC OXYGEN, UP TO 24 HOURS

## 2022-11-15 PROCEDURE — 63600175 PHARM REV CODE 636 W HCPCS: Performed by: INTERNAL MEDICINE

## 2022-11-15 PROCEDURE — 97116 GAIT TRAINING THERAPY: CPT

## 2022-11-15 PROCEDURE — 25000003 PHARM REV CODE 250: Performed by: FAMILY MEDICINE

## 2022-11-15 PROCEDURE — 94761 N-INVAS EAR/PLS OXIMETRY MLT: CPT

## 2022-11-15 PROCEDURE — 25000003 PHARM REV CODE 250: Performed by: SPECIALIST

## 2022-11-15 PROCEDURE — 97110 THERAPEUTIC EXERCISES: CPT

## 2022-11-15 RX ADMIN — FLUTICASONE FUROATE AND VILANTEROL TRIFENATATE 1 PUFF: 200; 25 POWDER RESPIRATORY (INHALATION) at 08:11

## 2022-11-15 RX ADMIN — ATORVASTATIN CALCIUM 10 MG: 10 TABLET, FILM COATED ORAL at 08:11

## 2022-11-15 RX ADMIN — OXYCODONE HYDROCHLORIDE AND ACETAMINOPHEN 1 TABLET: 10; 325 TABLET ORAL at 08:11

## 2022-11-15 RX ADMIN — ENOXAPARIN SODIUM 90 MG: 100 INJECTION SUBCUTANEOUS at 11:11

## 2022-11-15 RX ADMIN — HYDROCHLOROTHIAZIDE 12.5 MG: 12.5 TABLET ORAL at 08:11

## 2022-11-15 RX ADMIN — WARFARIN SODIUM 5 MG: 5 TABLET ORAL at 04:11

## 2022-11-15 RX ADMIN — SENNOSIDES AND DOCUSATE SODIUM 1 TABLET: 50; 8.6 TABLET ORAL at 08:11

## 2022-11-15 RX ADMIN — LOSARTAN POTASSIUM 25 MG: 25 TABLET, FILM COATED ORAL at 08:11

## 2022-11-15 RX ADMIN — ENOXAPARIN SODIUM 90 MG: 100 INJECTION SUBCUTANEOUS at 09:11

## 2022-11-15 RX ADMIN — PAROXETINE 20 MG: 10 TABLET, FILM COATED ORAL at 08:11

## 2022-11-15 NOTE — PT/OT/SLP PROGRESS
"Occupational Therapy  Treatment    Verito Weston   MRN: [id number]   Admitting Diagnosis: Aftercare for healing traumatic fracture of humerus, left    OT Date of Treatment: 11/15/22   OT Start Time: 1010  OT Stop Time: 1038  OT Total Time (min): 28 min    Billable Minutes:  Therapeutic Exercise 28 min               General Precautions: Standard, fall  Orthopedic Precautions: LUE non weight bearing  Braces: UE Sling  Respiratory Status: Nasal cannula, flow 2 L/min         Subjective:  Communicated with RN prior to session.        Pain/Comfort  Pain Rating 1: 0/10    Objective:  Patient found with: oxygen     Functional Mobility:  Bed Mobility:       Transfers:        Functional Ambulation:     Activities of Daily Living:      Therapeutic Activities and Exercises:  Patient completed the following for increased strength to increase I with ADLs: 1# DB RUE- shoulder press, chest press, bicep curls x 30, red theraband- tricep extension RUE x 30;  UBE 6 min; PROM of LUE done. Sling was repositioned correctly    AM-PAC 6 CLICK ADL   How much help from another person does this patient currently need?   1 = Unable, Total/Dependent Assistance  2 = A lot, Maximum/Moderate Assistance  3 = A little, Minimum/Contact Guard/Supervision  4 = None, Modified [place]/Independent    Putting on and taking off regular lower body clothing? : 2  Bathing (including washing, rinsing, drying)?: 2  Toileting, which includes using toilet, bedpan, or urinal? : 4  Putting on and taking off regular upper body clothing?: 3  Taking care of personal grooming such as brushing teeth?: 3  Eating meals?: 4  Daily Activity Total Score: 18     AM-PAC Raw Score CMS "G-Code Modifier Level of Impairment Assistance   6 % Total / Unable   7 - 8 CM 80 - 100% Maximal Assist   9-13 CL 60 - 80% Moderate Assist   14 - 19 CK 40 - 60% Moderate Assist   20 - 22 CJ 20 - 40% Minimal Assist   23 CI 1-20% SBA / CGA   24 CH 0% Independent/ Mod I       Patient left " up in chair with call button in reach    ASSESSMENT:  Verito Weston is a 89 y.o. female with a medical diagnosis of Aftercare for healing traumatic fracture of humerus, left and presents with decreased strength, decreased endurance, decreased self-care.    Rehab identified problem list/impairments: Rehab identified problem list/impairments: impaired endurance, weakness, impaired self care skills, impaired functional mobility, impaired balance, decreased lower extremity function, decreased upper extremity function, decreased safety awareness, pain    Rehab potential is good.    Activity tolerance: Good    Discharge recommendations: Discharge Facility/Level of Care Needs: home with home health     Barriers to discharge:      Equipment recommendations: 3-in-1 commode, walker, amrit     GOALS:   Multidisciplinary Problems       Occupational Therapy Goals          Problem: Occupational Therapy    Goal Priority Disciplines Outcome Interventions   Occupational Therapy Goal     OT, PT/OT Ongoing, Progressing    Description: Description: Grooming Status:   Short Term Goal: Pt will perform grooming with min a sitting EOB.   Long Term Goal: Pt will perform grooming/oral hygiene standing at sink with s/u      LE dressing Status:   Short Term Goal: Pt will perform LE dressing with min a.   Long Term Goal: Pt will perform LE dressing with s/u.    Toileting Status:   Short Term Goal: Pt will perform toilet hygiene on BSC with min a.  Long Term Goal: Pt will perform toilet hygiene on toilet with no AE with s/u.    Commode Transfer:   Short Term Goal: Pt will perform BSC t/f with s/u.  Long Term Goal:  Pt will perform toilet t/f in bathroom with mod I.     Bathing Status:   Long Term Goal: Pt will perform sponge bath with s/u with no unsafe fatigue.     Strength Status:   Long Term Goal: Pt to perform BUE strengthening with weights and/or body weight to increase ADL independence and safety    Endurance Status:   Short Term Goal:pt  to perform 15 min OT treatment with 5 or greater rest breaks  Long Term Goal: pt to perform 30 min OT treat with 3 or less rest breaks                       Plan:  Patient to be seen 5 x/week to address the above listed problems via therapeutic exercises  Plan of Care expires:    Plan of Care reviewed with: patient       Leann Rowley, OTR/L    11/15/2022

## 2022-11-15 NOTE — PT/OT/SLP PROGRESS
"Physical Therapy  Treatment    Verito Weston   MRN: [id number]   Admitting Diagnosis: Aftercare for healing traumatic fracture of humerus, left    PT Received On: 11/15/22  PT Start Time: 0945     PT Stop Time: 1009    PT Total Time (min): 24 min       Billable Minutes:  Gait Training 10 and Therapeutic Exercise 14    Treatment Type: Treatment  PT/PTA: PT     PTA Visit Number: 0       General Precautions: Standard, fall  Orthopedic Precautions: LUE non weight bearing   Braces: UE Sling  Respiratory Status: Nasal cannula, flow 2 L/min    Spiritual, Cultural Beliefs, Buddhist Practices, Values that Affect Care: no    Subjective:  Pt  denies pain and is agreeable to PT treatment.       Pain/Comfort  Pain Rating 1: 0/10    Objective:   Patient found with: oxygen    Functional Mobility:  Bed Mobility: not performed     Transfers: sit <> stand from dagmar chair with CGA for safety        Gait:200' with straight  Cane with CGA, frequent cueing to decrease gait speed and for proper cane placement to increase MILES        Stairs: Not performed    Therapeutic Activities and Exercises:    Ther- Ex    Nustep 5 minutes    Ankle pumps 30 x    Hip adduction 30 x 3"   Heelslides    LAQ's 2 x 10 B    Hamstring Curls 30 x red tband B    Quad sets     SAQ's    Seated Marching  2 x 10 B    Hip abduction 30 x red tband   Horizontal ADD/ABD    Glut sets  30 x        Ther-Act    Heel Raises    Mini Squats    3 way hip    Sit <> stands  2 x                      AM-PAC 6 CLICK MOBILITY  How much help from another person does this patient currently need?   1 = Unable, Total/Dependent Assistance  2 = A lot, Maximum/Moderate Assistance  3 = A little, Minimum/Contact Guard/Supervision  4 = None, Modified [place]/Independent    Turning over in bed (including adjusting bedclothes, sheets and blankets)?: 3  Sitting down on and standing up from a chair with arms (e.g., wheelchair, bedside commode, etc.): 3  Moving from lying on back to sitting on " the side of the bed?: 3  Moving to and from a bed to a chair (including a wheelchair)?: 3  Need to walk in hospital room?: 3  Climbing 3-5 steps with a railing?: 3  Basic Mobility Total Score: 18    AM-PAC Raw Score CMS G-Code Modifier Level of Impairment Assistance   6 % Total / Unable   7 - 9 CM 80 - 100% Maximal Assist   10 - 14 CL 60 - 80% Moderate Assist   15 - 19 CK 40 - 60% Moderate Assist   20 - 22 CJ 20 - 40% Minimal Assist   23 CI 1-20% SBA / CGA   24 CH 0% Independent/ Mod I     Patient left up in chair with call button in reach.    Assessment:  Verito Weston is a 89 y.o. female with a medical diagnosis of Aftercare for healing traumatic fracture of humerus, left and presents with muscle weakness, gait instability, decreased UE use, and impaired balance. Pt required cueing for movement through full ROM with LE exercises and cueing for decreased speed of movement. Patient required cueing to decrease speed for increased safety during ambulation. Patient required occasional rest breaks due to fatigue and [place].     Rehab identified problem list/impairments: Rehab identified problem list/impairments: weakness, impaired endurance, decreased lower extremity function, gait instability, impaired functional mobility, impaired balance    Rehab potential is good.    Activity tolerance: Fair    Discharge recommendations: Discharge Facility/Level of Care Needs: home with home health     Barriers to discharge:      Equipment recommendations: Equipment Needed After Discharge: 3-in-1 commode     GOALS:   Multidisciplinary Problems       Physical Therapy Goals          Problem: Physical Therapy    Goal Priority Disciplines Outcome Goal Variances Interventions   Physical Therapy Goal     PT, PT/OT Ongoing, Progressing     Description: Short Term Goals  1. Patient will complete 30 reps of B LE exercises with correct form.   2. Patient will complete sit<>stand transfers with SBA.  3. Patient will ambulate 100 feet  with hemiwalker on level surfaces with CGA.     Long Term Goals   1. Patient will ambulate 300 feet with hemiwalker on level and unlevel surfaces with SBA.  2. Patient will complete all functional transfers with MOD I.   3. Patient will negotiate up and down 5 stairs with use of handrail with CGA                       PLAN:    Patient to be seen 5 x/week  to address the above listed problems via gait training, therapeutic exercises  Plan of Care expires: 11/18/22  Plan of Care reviewed with: patient     Continue POC per PT order to progress patient toward rehab goals as tolerated by patient.    John Gomes, PT, DPT      11/15/2022

## 2022-11-16 PROCEDURE — 25000003 PHARM REV CODE 250: Performed by: SPECIALIST

## 2022-11-16 PROCEDURE — 94640 AIRWAY INHALATION TREATMENT: CPT

## 2022-11-16 PROCEDURE — 97110 THERAPEUTIC EXERCISES: CPT

## 2022-11-16 PROCEDURE — 99900035 HC TECH TIME PER 15 MIN (STAT)

## 2022-11-16 PROCEDURE — 94761 N-INVAS EAR/PLS OXIMETRY MLT: CPT

## 2022-11-16 PROCEDURE — 97116 GAIT TRAINING THERAPY: CPT | Mod: CQ

## 2022-11-16 PROCEDURE — 27000221 HC OXYGEN, UP TO 24 HOURS

## 2022-11-16 PROCEDURE — 97110 THERAPEUTIC EXERCISES: CPT | Mod: CQ

## 2022-11-16 PROCEDURE — 25000003 PHARM REV CODE 250: Performed by: FAMILY MEDICINE

## 2022-11-16 PROCEDURE — 25000242 PHARM REV CODE 250 ALT 637 W/ HCPCS: Performed by: FAMILY MEDICINE

## 2022-11-16 PROCEDURE — 63600175 PHARM REV CODE 636 W HCPCS: Performed by: INTERNAL MEDICINE

## 2022-11-16 PROCEDURE — 11000004 HC SNF PRIVATE

## 2022-11-16 RX ADMIN — ATORVASTATIN CALCIUM 10 MG: 10 TABLET, FILM COATED ORAL at 08:11

## 2022-11-16 RX ADMIN — SENNOSIDES AND DOCUSATE SODIUM 1 TABLET: 50; 8.6 TABLET ORAL at 08:11

## 2022-11-16 RX ADMIN — WARFARIN SODIUM 5 MG: 5 TABLET ORAL at 04:11

## 2022-11-16 RX ADMIN — ENOXAPARIN SODIUM 90 MG: 100 INJECTION SUBCUTANEOUS at 09:11

## 2022-11-16 RX ADMIN — HYDROCHLOROTHIAZIDE 12.5 MG: 12.5 TABLET ORAL at 08:11

## 2022-11-16 RX ADMIN — FLUTICASONE FUROATE AND VILANTEROL TRIFENATATE 1 PUFF: 200; 25 POWDER RESPIRATORY (INHALATION) at 08:11

## 2022-11-16 RX ADMIN — ENOXAPARIN SODIUM 90 MG: 100 INJECTION SUBCUTANEOUS at 10:11

## 2022-11-16 RX ADMIN — LOSARTAN POTASSIUM 25 MG: 25 TABLET, FILM COATED ORAL at 08:11

## 2022-11-16 RX ADMIN — PAROXETINE 20 MG: 10 TABLET, FILM COATED ORAL at 08:11

## 2022-11-16 RX ADMIN — IPRATROPIUM BROMIDE AND ALBUTEROL SULFATE 3 ML: 2.5; .5 SOLUTION RESPIRATORY (INHALATION) at 07:11

## 2022-11-16 RX ADMIN — OXYCODONE HYDROCHLORIDE AND ACETAMINOPHEN 1 TABLET: 10; 325 TABLET ORAL at 08:11

## 2022-11-16 NOTE — PT/OT/SLP PROGRESS
"Occupational Therapy  Treatment    Verito Weston   MRN: [id number]   Admitting Diagnosis: Aftercare for healing traumatic fracture of humerus, left    OT Date of Treatment: 11/16/22   OT Start Time: 0826  OT Stop Time: 0856  OT Total Time (min): 30 min    Billable Minutes:  Therapeutic Exercise 15 min  Pt was treated with an outpatient and charges were adjusted             General Precautions: Standard, fall  Orthopedic Precautions: LUE non weight bearing  Braces: UE Sling  Respiratory Status: Nasal cannula, flow 2 L/min         Subjective:  Communicated with RN prior to session.        Pain/Comfort  Pain Rating 1: 0/10    Objective:  Patient found with: oxygen     Functional Mobility:  Bed Mobility:       Transfers:        Functional Ambulation:     Activities of Daily Living:      Therapeutic Activities and Exercises:  Patient completed the following for increased strength to increase I with ADLs: 1# DB RUE- shoulder press, chest press, bicep curls x 30, red theraband- tricep extension RUE x 30;  UBE 6 min; PROM of LUE done. Sling was repositioned correctly    AM-PAC 6 CLICK ADL   How much help from another person does this patient currently need?   1 = Unable, Total/Dependent Assistance  2 = A lot, Maximum/Moderate Assistance  3 = A little, Minimum/Contact Guard/Supervision  4 = None, Modified [place]/Independent    Putting on and taking off regular lower body clothing? : 2  Bathing (including washing, rinsing, drying)?: 2  Toileting, which includes using toilet, bedpan, or urinal? : 4  Putting on and taking off regular upper body clothing?: 3  Taking care of personal grooming such as brushing teeth?: 3  Eating meals?: 4  Daily Activity Total Score: 18     AM-PAC Raw Score CMS "G-Code Modifier Level of Impairment Assistance   6 % Total / Unable   7 - 8 CM 80 - 100% Maximal Assist   9-13 CL 60 - 80% Moderate Assist   14 - 19 CK 40 - 60% Moderate Assist   20 - 22 CJ 20 - 40% Minimal Assist   23 CI 1-20% " SBA / CGA   24 CH 0% Independent/ Mod I       Patient left up in chair with  PT notified    ASSESSMENT:  Verito Weston is a 89 y.o. female with a medical diagnosis of Aftercare for healing traumatic fracture of humerus, left and presents with decreased strength, decreased endurance, decreased self-care.    Rehab identified problem list/impairments: Rehab identified problem list/impairments: weakness, impaired endurance, impaired self care skills, impaired functional mobility, impaired balance, decreased upper extremity function, decreased lower extremity function, decreased safety awareness    Rehab potential is good.    Activity tolerance: Good    Discharge recommendations: Discharge Facility/Level of Care Needs: home with home health     Barriers to discharge:      Equipment recommendations: 3-in-1 commode, walker, amrit     GOALS:   Multidisciplinary Problems       Occupational Therapy Goals          Problem: Occupational Therapy    Goal Priority Disciplines Outcome Interventions   Occupational Therapy Goal     OT, PT/OT Ongoing, Progressing    Description: Description: Grooming Status:   Short Term Goal: Pt will perform grooming with min a sitting EOB.   Long Term Goal: Pt will perform grooming/oral hygiene standing at sink with s/u      LE dressing Status:   Short Term Goal: Pt will perform LE dressing with min a.   Long Term Goal: Pt will perform LE dressing with s/u.    Toileting Status:   Short Term Goal: Pt will perform toilet hygiene on BSC with min a.  Long Term Goal: Pt will perform toilet hygiene on toilet with no AE with s/u.    Commode Transfer:   Short Term Goal: Pt will perform BSC t/f with s/u.  Long Term Goal:  Pt will perform toilet t/f in bathroom with mod I.     Bathing Status:   Long Term Goal: Pt will perform sponge bath with s/u with no unsafe fatigue.     Strength Status:   Long Term Goal: Pt to perform BUE strengthening with weights and/or body weight to increase ADL independence and  safety    Endurance Status:   Short Term Goal:pt to perform 15 min OT treatment with 5 or greater rest breaks  Long Term Goal: pt to perform 30 min OT treat with 3 or less rest breaks                       Plan:  Patient to be seen 5 x/week to address the above listed problems via therapeutic exercises  Plan of Care expires:    Plan of Care reviewed with: patient       Leann RyanKenzie, OTR/L    11/16/2022

## 2022-11-16 NOTE — PT/OT/SLP PROGRESS
"Physical Therapy  Treatment    Verito Weston   MRN: [id number]   Admitting Diagnosis: Aftercare for healing traumatic fracture of humerus, left    PT Received On: 11/16/22  PT Start Time: 0905     PT Stop Time: 0942    PT Total Time (min): 35 min       Billable Minutes:  Gait Training 10 and Therapeutic Exercise 14    Treatment Type: Treatment  PT/PTA: PTA     PTA Visit Number: 1       General Precautions: Standard, fall  Orthopedic Precautions: LUE non weight bearing   Braces: UE Sling  Respiratory Status: Nasal cannula, flow 2 L/min    Spiritual, Cultural Beliefs, Zoroastrian Practices, Values that Affect Care: no    Subjective:    Pt  denies pain and is agreeable to PT treatment.       Pain/Comfort  Pain Rating 1: 0/10    Objective:   Patient found with: oxygen    Functional Mobility:  Bed Mobility: not performed     Transfers: sit <> stand from dagmar chair with CGA for safety        Gait: 120' with straight  Cane with CGA, frequent cueing to decrease gait speed and for proper cane placement to increase MILES        Stairs: Not performed    Therapeutic Activities and Exercises:    Ther- Ex    Nustep 5 minutes    Ankle pumps 30 x    Hip adduction 30 x 3"   Heelslides    LAQ's 2 x 10 B    Hamstring Curls 30 x red tband B    Quad sets  30 x 3"   SAQ's    Seated Marching  3 x 10 B    Hip abduction 30 x red tband   Horizontal ADD/ABD 30 x   Glut sets  30 x        Ther-Act    Heel Raises    Mini Squats    3 way hip    Sit <> stands  2 x                      AM-PAC 6 CLICK MOBILITY  How much help from another person does this patient currently need?   1 = Unable, Total/Dependent Assistance  2 = A lot, Maximum/Moderate Assistance  3 = A little, Minimum/Contact Guard/Supervision  4 = None, Modified [place]/Independent    Turning over in bed (including adjusting bedclothes, sheets and blankets)?: 3  Sitting down on and standing up from a chair with arms (e.g., wheelchair, bedside commode, etc.): 3  Moving from lying on " back to sitting on the side of the bed?: 3  Moving to and from a bed to a chair (including a wheelchair)?: 3  Need to walk in hospital room?: 3  Climbing 3-5 steps with a railing?: 3  Basic Mobility Total Score: 18    AM-PAC Raw Score CMS G-Code Modifier Level of Impairment Assistance   6 % Total / Unable   7 - 9 CM 80 - 100% Maximal Assist   10 - 14 CL 60 - 80% Moderate Assist   15 - 19 CK 40 - 60% Moderate Assist   20 - 22 CJ 20 - 40% Minimal Assist   23 CI 1-20% SBA / CGA   24 CH 0% Independent/ Mod I     Patient left up in chair with call button in reach.    Assessment:  Verito Weston is a 89 y.o. female with a medical diagnosis of Aftercare for healing traumatic fracture of humerus, left and presents with muscle weakness, gait instability, decreased UE use, and impaired balance. Pt required cueing for movement through full ROM with LE exercises and cueing for decreased speed of movement. Pt continues to display increased strength by performing more repetitions than asked with all TherEx. Pt displayed increased endurance by requiring less frequent rest breaks. Pt continues to required mod cueing to decrease ambulation speed and increase safety awareness. Upon completion of tx session, pt returned to room and left in bathroom with nursing notified.  Rehab identified problem list/impairments: Rehab identified problem list/impairments: weakness, impaired endurance, impaired self care skills, impaired functional mobility, impaired balance, decreased upper extremity function, decreased lower extremity function, decreased safety awareness, pain    Rehab potential is good.    Activity tolerance: Fair    Discharge recommendations: Discharge Facility/Level of Care Needs: home with home health     Barriers to discharge:      Equipment recommendations: Equipment Needed After Discharge: 3-in-1 commode, walker, amrit     GOALS:   Multidisciplinary Problems       Physical Therapy Goals          Problem: Physical  Therapy    Goal Priority Disciplines Outcome Goal Variances Interventions   Physical Therapy Goal     PT, PT/OT Ongoing, Progressing     Description: Short Term Goals  1. Patient will complete 30 reps of B LE exercises with correct form.   2. Patient will complete sit<>stand transfers with SBA.  3. Patient will ambulate 100 feet with hemiwalker on level surfaces with CGA.     Long Term Goals   1. Patient will ambulate 300 feet with hemiwalker on level and unlevel surfaces with SBA.  2. Patient will complete all functional transfers with MOD I.   3. Patient will negotiate up and down 5 stairs with use of handrail with CGA                       PLAN:    Patient to be seen 5 x/week  to address the above listed problems via gait training, therapeutic exercises  Plan of Care expires: 11/18/22  Plan of Care reviewed with: patient     Continue POC per PT order to progress patient toward rehab goals as tolerated by patient.    KATRINA Gerardo   11/16/2022

## 2022-11-16 NOTE — PLAN OF CARE
Problem: Adult Inpatient Plan of Care  Goal: Plan of Care Review  Outcome: Ongoing, Progressing  Goal: Patient-Specific Goal (Individualized)  Outcome: Ongoing, Progressing  Goal: Absence of Hospital-Acquired Illness or Injury  Outcome: Ongoing, Progressing  Goal: Optimal Comfort and Wellbeing  Outcome: Ongoing, Progressing     Problem: Fall Injury Risk  Goal: Absence of Fall and Fall-Related Injury  Outcome: Ongoing, Progressing     Problem: Skin Injury Risk Increased  Goal: Skin Health and Integrity  Outcome: Ongoing, Progressing     Problem: Gas Exchange Impaired  Goal: Optimal Gas Exchange  Outcome: Ongoing, Progressing

## 2022-11-16 NOTE — PLAN OF CARE
Problem: Adult Inpatient Plan of Care  Goal: Plan of Care Review  Outcome: Ongoing, Progressing  Flowsheets (Taken 11/15/2022 8204)  Plan of Care Reviewed With: patient  Goal: Absence of Hospital-Acquired Illness or Injury  Outcome: Ongoing, Progressing  Goal: Optimal Comfort and Wellbeing  Outcome: Ongoing, Progressing     Problem: Fall Injury Risk  Goal: Absence of Fall and Fall-Related Injury  Outcome: Ongoing, Progressing     Problem: Skin Injury Risk Increased  Goal: Skin Health and Integrity  Outcome: Ongoing, Progressing

## 2022-11-17 VITALS
RESPIRATION RATE: 18 BRPM | HEIGHT: 68 IN | TEMPERATURE: 98 F | HEART RATE: 87 BPM | WEIGHT: 189.63 LBS | SYSTOLIC BLOOD PRESSURE: 144 MMHG | OXYGEN SATURATION: 96 % | DIASTOLIC BLOOD PRESSURE: 74 MMHG | BODY MASS INDEX: 28.74 KG/M2

## 2022-11-17 LAB
ANION GAP SERPL CALCULATED.3IONS-SCNC: 10 MMOL/L (ref 7–16)
BASOPHILS # BLD AUTO: 0.05 K/UL (ref 0–0.2)
BASOPHILS NFR BLD AUTO: 0.8 % (ref 0–1)
BUN SERPL-MCNC: 11 MG/DL (ref 7–18)
BUN/CREAT SERPL: 23 (ref 6–20)
CALCIUM SERPL-MCNC: 8.6 MG/DL (ref 8.5–10.1)
CHLORIDE SERPL-SCNC: 103 MMOL/L (ref 98–107)
CO2 SERPL-SCNC: 32 MMOL/L (ref 21–32)
CREAT SERPL-MCNC: 0.48 MG/DL (ref 0.55–1.02)
DIFFERENTIAL METHOD BLD: ABNORMAL
EGFR (NO RACE VARIABLE) (RUSH/TITUS): 90 ML/MIN/1.73M²
EOSINOPHIL # BLD AUTO: 0.31 K/UL (ref 0–0.5)
EOSINOPHIL NFR BLD AUTO: 5.2 % (ref 1–4)
ERYTHROCYTE [DISTWIDTH] IN BLOOD BY AUTOMATED COUNT: 14.9 % (ref 11.5–14.5)
GLUCOSE SERPL-MCNC: 94 MG/DL (ref 74–106)
HCT VFR BLD AUTO: 35.8 % (ref 38–47)
HGB BLD-MCNC: 10.9 G/DL (ref 12–16)
IMM GRANULOCYTES # BLD AUTO: 0.02 K/UL (ref 0–0.04)
IMM GRANULOCYTES NFR BLD: 0.3 % (ref 0–0.4)
INR BLD: 1.59
LYMPHOCYTES # BLD AUTO: 1.53 K/UL (ref 1–4.8)
LYMPHOCYTES NFR BLD AUTO: 25.6 % (ref 27–41)
MCH RBC QN AUTO: 29 PG (ref 27–31)
MCHC RBC AUTO-ENTMCNC: 30.4 G/DL (ref 32–36)
MCV RBC AUTO: 95.2 FL (ref 80–96)
MONOCYTES # BLD AUTO: 0.98 K/UL (ref 0–0.8)
MONOCYTES NFR BLD AUTO: 16.4 % (ref 2–6)
MPC BLD CALC-MCNC: 10.1 FL (ref 9.4–12.4)
NEUTROPHILS # BLD AUTO: 3.08 K/UL (ref 1.8–7.7)
NEUTROPHILS NFR BLD AUTO: 51.7 % (ref 53–65)
PLATELET # BLD AUTO: 294 K/UL (ref 150–400)
POTASSIUM SERPL-SCNC: 4.3 MMOL/L (ref 3.5–5.1)
PROTHROMBIN TIME: 18.9 SECONDS (ref 11.7–14.7)
RBC # BLD AUTO: 3.76 M/UL (ref 4.2–5.4)
SODIUM SERPL-SCNC: 141 MMOL/L (ref 136–145)
WBC # BLD AUTO: 5.97 K/UL (ref 4.5–11)

## 2022-11-17 PROCEDURE — 25000003 PHARM REV CODE 250: Performed by: FAMILY MEDICINE

## 2022-11-17 PROCEDURE — 99315 PR NURSING FAC DISCHRGE DAY,1-30 MIN: ICD-10-PCS | Mod: ,,, | Performed by: FAMILY MEDICINE

## 2022-11-17 PROCEDURE — 27000221 HC OXYGEN, UP TO 24 HOURS

## 2022-11-17 PROCEDURE — 94761 N-INVAS EAR/PLS OXIMETRY MLT: CPT

## 2022-11-17 PROCEDURE — 36415 COLL VENOUS BLD VENIPUNCTURE: CPT | Performed by: FAMILY MEDICINE

## 2022-11-17 PROCEDURE — 85025 COMPLETE CBC W/AUTO DIFF WBC: CPT | Performed by: FAMILY MEDICINE

## 2022-11-17 PROCEDURE — 99900035 HC TECH TIME PER 15 MIN (STAT)

## 2022-11-17 PROCEDURE — 99315 NF DSCHRG MGMT 30 MIN/LESS: CPT | Mod: ,,, | Performed by: FAMILY MEDICINE

## 2022-11-17 PROCEDURE — 85610 PROTHROMBIN TIME: CPT | Performed by: FAMILY MEDICINE

## 2022-11-17 PROCEDURE — 94640 AIRWAY INHALATION TREATMENT: CPT

## 2022-11-17 PROCEDURE — 80048 BASIC METABOLIC PNL TOTAL CA: CPT | Performed by: FAMILY MEDICINE

## 2022-11-17 RX ORDER — OXYCODONE AND ACETAMINOPHEN 10; 325 MG/1; MG/1
1 TABLET ORAL EVERY 12 HOURS PRN
Status: DISCONTINUED | OUTPATIENT
Start: 2022-11-17 | End: 2022-11-17 | Stop reason: HOSPADM

## 2022-11-17 RX ORDER — OXYCODONE AND ACETAMINOPHEN 10; 325 MG/1; MG/1
1 TABLET ORAL EVERY 12 HOURS PRN
COMMUNITY

## 2022-11-17 RX ADMIN — HYDROCHLOROTHIAZIDE 12.5 MG: 12.5 TABLET ORAL at 08:11

## 2022-11-17 RX ADMIN — SENNOSIDES AND DOCUSATE SODIUM 1 TABLET: 50; 8.6 TABLET ORAL at 08:11

## 2022-11-17 RX ADMIN — LOSARTAN POTASSIUM 25 MG: 25 TABLET, FILM COATED ORAL at 08:11

## 2022-11-17 RX ADMIN — PAROXETINE 20 MG: 10 TABLET, FILM COATED ORAL at 08:11

## 2022-11-17 RX ADMIN — FLUTICASONE FUROATE AND VILANTEROL TRIFENATATE 1 PUFF: 200; 25 POWDER RESPIRATORY (INHALATION) at 08:11

## 2022-11-17 NOTE — PLAN OF CARE
Problem: Adult Inpatient Plan of Care  Goal: Plan of Care Review  Outcome: Ongoing, Progressing  Goal: Patient-Specific Goal (Individualized)  Outcome: Ongoing, Progressing  Goal: Absence of Hospital-Acquired Illness or Injury  Outcome: Ongoing, Progressing  Goal: Optimal Comfort and Wellbeing  Outcome: Ongoing, Progressing  Goal: Readiness for Transition of Care  Outcome: Ongoing, Progressing     Problem: Fall Injury Risk  Goal: Absence of Fall and Fall-Related Injury  Outcome: Ongoing, Progressing     Problem: Skin Injury Risk Increased  Goal: Skin Health and Integrity  Outcome: Ongoing, Progressing     Problem: Gas Exchange Impaired  Goal: Optimal Gas Exchange  Outcome: Ongoing, Progressing

## 2022-11-17 NOTE — PLAN OF CARE
Problem: Skin Injury Risk Increased  Goal: Skin Health and Integrity  Outcome: Ongoing, Progressing  Intervention: Optimize Skin Protection  Flowsheets (Taken 11/16/2022 1802)  Pressure Reduction Techniques: frequent weight shift encouraged  Pressure Reduction Devices: feet on footrest/footstool  Skin Protection:   adhesive use limited   drying agents applied  Head of Bed (HOB) Positioning: HOB elevated  Intervention: Promote and Optimize Oral Intake  Flowsheets (Taken 11/16/2022 1802)  Oral Nutrition Promotion: calorie-dense foods provided     Problem: Gas Exchange Impaired  Goal: Optimal Gas Exchange  Outcome: Ongoing, Progressing  Intervention: Optimize Oxygenation and Ventilation  Flowsheets (Taken 11/16/2022 1802)  Airway/Ventilation Management: other (see comments)  Head of Bed (HOB) Positioning: HOB elevated

## 2022-11-17 NOTE — DISCHARGE SUMMARY
[facility]  Hospital Medicine  Discharge Summary      Patient Name: Verito Weston  MRN: [id number]  JAVIER: [id number]  Patient Class: IP- Swing  Admission Date: 10/28/2022  Hospital Length of Stay: 20 days  Discharge Date and Time:  11/17/2022 8:04 AM  Attending Physician: Catrachita Hodge, *   Discharging Provider: Catrachita Hodge MD  Primary Care Provider: Maricel Avila MD    Primary Care Team: Networked reference to record PCT     HPI:   This 89 yr. Old WF has been switched to Swing Bed status. See H7P done by Dr. DEVIN Singletary      * No surgery found *      Hospital Course:   10/31/22 - pt. Doing well this AM. No new complaints.    11/7/22 - pt. Doing well. Ready for therapy.    11/11/22 - doing well. Will continue present treatment.    11/14/22 - doing well. No complaints voiced.    11/17/22 - pt. Is ready to go home. Her INR is slightly low on the coumadin 5 mg daily. She will be followed by Dr. FALGUNI Avila concerning this. She will continue therapy at home with home health.       Goals of Care Treatment Preferences:  Code Status: Full Code      Consults:   Consults (From admission, onward)        Status Ordering Provider     Inpatient consult to Social Work  Once        Provider:  (Not yet assigned)    Acknowledged CATRACHITA HODGE     Inpatient consult to Registered Dietitian/Nutritionist  Once        Provider:  (Not yet assigned)    Completed CATRACHITA HODGE          No new Assessment & Plan notes have been filed under this hospital service since the last note was generated.  Service: Hospital Medicine    Final Active Diagnoses:    Diagnosis Date Noted POA    PRINCIPAL PROBLEM:  Aftercare for healing traumatic fracture of humerus, left [S42.302D] 11/03/2022 Not Applicable    Dizziness [R42] 11/03/2022 No    Closed fracture of left proximal humerus [S42.202A] 10/27/2022 Yes      Problems Resolved During this Admission:       Discharged  Condition: stable    Disposition:     Follow Up:    Patient Instructions:   No discharge procedures on file.    Significant Diagnostic Studies: Labs: All labs within the past 24 hours have been reviewed    Pending Diagnostic Studies:     None         Medications:  Reconciled Home Medications:      Medication List      CONTINUE taking these medications    atorvastatin 10 MG tablet  Commonly known as: LIPITOR  10 mg every evening.     hydroCHLOROthiazide 12.5 mg capsule  Commonly known as: MICROZIDE  12.5 mg once daily.     losartan 25 MG tablet  Commonly known as: COZAAR  Take 25 mg by mouth once daily.     meclizine 25 mg tablet  Commonly known as: ANTIVERT  25 mg 3 (three) times daily as needed.     paroxetine 20 MG tablet  Commonly known as: PAXIL  Take 20 mg by mouth once daily.     SYMBICORT 160-4.5 mcg/actuation Hfaa  Generic drug: budesonide-formoterol 160-4.5 mcg     * warfarin 5 MG tablet  Commonly known as: COUMADIN  every Mon, Wed, Fri.     * warfarin 2.5 MG tablet  Commonly known as: COUMADIN  Take 2.5 mg by mouth every Tuesday, Thursday, Saturday, Sunday.     XIIDRA 5 % Dpet  Generic drug: lifitegrast  Apply 1 drop to eye 2 (two) times a day.         * This list has 2 medication(s) that are the same as other medications prescribed for you. Read the directions carefully, and ask your doctor or other care provider to review them with you.                Indwelling Lines/Drains at time of discharge:   Lines/Drains/Airways     None                 Time spent on the discharge of patient: 30 minutes         Catrachita Hyman MD  Department of Hospital Medicine  [facility]

## 2022-11-17 NOTE — PLAN OF CARE
Problem: Adult Inpatient Plan of Care  Goal: Plan of Care Review  Outcome: Met  Goal: Patient-Specific Goal (Individualized)  Outcome: Met  Goal: Absence of Hospital-Acquired Illness or Injury  Outcome: Met  Goal: Optimal Comfort and Wellbeing  Outcome: Met  Goal: Readiness for Transition of Care  Outcome: Met     Problem: Fall Injury Risk  Goal: Absence of Fall and Fall-Related Injury  Outcome: Met     Problem: Skin Injury Risk Increased  Goal: Skin Health and Integrity  Outcome: Met     Problem: Gas Exchange Impaired  Goal: Optimal Gas Exchange  Outcome: Met

## 2022-11-17 NOTE — NURSING
Patient and caregiver presented with AVS and instructions provided-- verbalized understanding. Home meds returned to patient. Patient left facility via wheelchair to POV with family at 1042.

## 2022-12-13 NOTE — PT/OT/SLP PROGRESS
"Physical Therapy  Treatment    Verito Weston   MRN: [id number]   Admitting Diagnosis: Closed fracture of left proximal humerus    PT Received On: 11/04/22  PT Start Time: 0820     PT Stop Time: 0900    PT Total Time (min): 40 min       Billable Minutes:  Gait Training 10 and Therapeutic Exercise 25    Treatment Type: Treatment  PT/PTA: PTA     PTA Visit Number: 2       General Precautions: Standard, fall  Orthopedic Precautions: LUE non weight bearing   Braces: UE Sling  Respiratory Status: Nasal cannula, flow 2 L/min    Spiritual, Cultural Beliefs, Druze Practices, Values that Affect Care: no    Subjective:  Pt reports, "I feel a little jittery today.  I didn't have a good night.  Someone woke me up coming into my room".      Pain/Comfort  Pain Rating 1: 0/10    Objective:   Patient found with: oxygen    Functional Mobility:  Bed Mobility: not performed       Transfers: sit <> stand min A x 1        Gait: 150' with straight  Cane with CGA, constant cueing to slow down       Stairs: Not performed    Therapeutic Activities and Exercises:    Ther- Ex    Nustep    Ankle pumps 30 x    Hip adduction 30 x 3"   Heelslides    LAQ's 30 x 1.5#   Hamstring Curls 30 x red tband   Quad sets  30 x 3"   SAQ's 30x 3"   Straight Leg Raises    Hip abduction 30 x red tband   Horizontal ADD/ABD 30 x each    Glut sets  30 x        Ther-Act    Heel Raises    Mini Squats    3 way hip    Sit <> stands  1 x                      AM-PAC 6 CLICK MOBILITY  How much help from another person does this patient currently need?   1 = Unable, Total/Dependent Assistance  2 = A lot, Maximum/Moderate Assistance  3 = A little, Minimum/Contact Guard/Supervision  4 = None, Modified [place]/Independent    Turning over in bed (including adjusting bedclothes, sheets and blankets)?: 3  Sitting down on and standing up from a chair with arms (e.g., wheelchair, bedside commode, etc.): 3  Moving from lying on back to sitting on the side of the bed?: " 3  Moving to and from a bed to a chair (including a wheelchair)?: 3  Need to walk in hospital room?: 3  Climbing 3-5 steps with a railing?: 2  Basic Mobility Total Score: 17    AM-PAC Raw Score CMS G-Code Modifier Level of Impairment Assistance   6 % Total / Unable   7 - 9 CM 80 - 100% Maximal Assist   10 - 14 CL 60 - 80% Moderate Assist   15 - 19 CK 40 - 60% Moderate Assist   20 - 22 CJ 20 - 40% Minimal Assist   23 CI 1-20% SBA / CGA   24 CH 0% Independent/ Mod I     Patient left up in chair with call button in reach.    Assessment:  Verito Weston is a 89 y.o. female with a medical diagnosis of Closed fracture of left proximal humerus and presents with muscle weakness, gait instability, decreased UE use, and impaired balance. Pt able to complete exercises with minimal rest breaks required. Pt required mod cueing to progress through tx session with proper speed, form, count, and gait.  Patient presented with difficulty using SBQC, and required cues for correct cane placement, and to no tip cane.  LPTA switched from SBQC to SC, and patient's quality of gait improved.  Patient reports fatigue without adverse effects noted to PT treatment session.         Rehab identified problem list/impairments: Rehab identified problem list/impairments: weakness, impaired balance, impaired endurance, impaired functional mobility, impaired self care skills, decreased lower extremity function, decreased upper extremity function, gait instability    Rehab potential is good.    Activity tolerance: Fair    Discharge recommendations: Discharge Facility/Level of Care Needs: home with home health     Barriers to discharge:      Equipment recommendations: Equipment Needed After Discharge: 3-in-1 commode     GOALS:   Multidisciplinary Problems       Physical Therapy Goals          Problem: Physical Therapy    Goal Priority Disciplines Outcome Goal Variances Interventions   Physical Therapy Goal     PT, PT/OT Ongoing, Progressing      Description: Short Term Goals  1. Patient will complete 30 reps of B LE exercises with correct form.   2. Patient will complete sit<>stand transfers with SBA.  3. Patient will ambulate 100 feet with hemiwalker on level surfaces with CGA.     Long Term Goals   1. Patient will ambulate 300 feet with hemiwalker on level and unlevel surfaces with SBA.  2. Patient will complete all functional transfers with MOD I.   3. Patient will negotiate up and down 5 stairs with use of handrail with CGA                       PLAN:    Patient to be seen 5 x/week  to address the above listed problems via gait training, therapeutic activities, therapeutic exercises  Plan of Care expires: 11/18/22  Plan of Care reviewed with: patient     Continue POC per PT order to progress patient toward rehab goals as tolerated by patient.    KATRINA Jolley    11/4/2022       Render Risk Assessment In Note?: no Detail Level: Simple Additional Notes: Continue Vitamin C serum daily.

## 2023-04-03 ENCOUNTER — HOSPITAL ENCOUNTER (EMERGENCY)
Facility: HOSPITAL | Age: 88
Discharge: HOME | End: 2023-04-03
Attending: EMERGENCY MEDICINE
Payer: MEDICARE

## 2023-04-03 VITALS
SYSTOLIC BLOOD PRESSURE: 159 MMHG | DIASTOLIC BLOOD PRESSURE: 91 MMHG | HEART RATE: 87 BPM | HEIGHT: 68 IN | BODY MASS INDEX: 28.79 KG/M2 | TEMPERATURE: 98 F | RESPIRATION RATE: 18 BRPM | WEIGHT: 190 LBS | OXYGEN SATURATION: 96 %

## 2023-04-03 DIAGNOSIS — S00.03XA CONTUSION OF SCALP, INITIAL ENCOUNTER: ICD-10-CM
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
## 2023-04-03 DIAGNOSIS — S00.03XA HEMATOMA OF OCCIPITAL REGION OF SCALP: Primary | ICD-10-CM

## 2023-04-03 DIAGNOSIS — S81.811A NONINFECTED SKIN TEAR OF RIGHT LOWER EXTREMITY, INITIAL ENCOUNTER: ICD-10-CM

## 2023-04-03 LAB
INR BLD: 2.68
PROTHROMBIN TIME: 28.2 SECONDS (ref 11.7–14.7)

## 2023-04-03 PROCEDURE — 63600175 PHARM REV CODE 636 W HCPCS: Performed by: EMERGENCY MEDICINE

## 2023-04-03 PROCEDURE — 99285 EMERGENCY DEPT VISIT HI MDM: CPT | Mod: 25

## 2023-04-03 PROCEDURE — 90715 TDAP VACCINE 7 YRS/> IM: CPT | Performed by: EMERGENCY MEDICINE

## 2023-04-03 PROCEDURE — 99284 EMERGENCY DEPT VISIT MOD MDM: CPT | Performed by: EMERGENCY MEDICINE

## 2023-04-03 PROCEDURE — 90471 IMMUNIZATION ADMIN: CPT | Performed by: EMERGENCY MEDICINE

## 2023-04-03 PROCEDURE — 85610 PROTHROMBIN TIME: CPT | Performed by: EMERGENCY MEDICINE

## 2023-04-03 RX ORDER — IPRATROPIUM BROMIDE AND ALBUTEROL SULFATE 2.5; .5 MG/3ML; MG/3ML
3 SOLUTION RESPIRATORY (INHALATION) 4 TIMES DAILY PRN
COMMUNITY
Start: 2023-01-12

## 2023-04-03 RX ORDER — CYCLOSPORINE 0.5 MG/ML
1 EMULSION OPHTHALMIC 2 TIMES DAILY
COMMUNITY
Start: 2023-01-17 | End: 2023-04-03 | Stop reason: SDUPTHER

## 2023-04-03 RX ORDER — AZITHROMYCIN 250 MG/1
250 TABLET, FILM COATED ORAL DAILY
Qty: 6 TABLET | Refills: 0 | Status: SHIPPED | OUTPATIENT
Start: 2023-04-03

## 2023-04-03 RX ADMIN — TETANUS TOXOID, REDUCED DIPHTHERIA TOXOID AND ACELLULAR PERTUSSIS VACCINE, ADSORBED 0.5 ML: 5; 2.5; 8; 8; 2.5 SUSPENSION INTRAMUSCULAR at 10:04

## 2023-04-04 NOTE — ED PROVIDER NOTES
Encounter Date: 4/3/2023       History     Chief Complaint   Patient presents with    Fall     Patient presents with fall at home, was tripped by her cat.  Hit the back of her head.  No loss of consciousness no neurologic deficits reported.  Earlier in the day, patient sustained a scratch from her cat on the right calf area laterally that caused a skin tear.  She is not up-to-date on her tetanus.    Review of patient's allergies indicates:  No Known Allergies  Past Medical History:   Diagnosis Date    Afib     Asthma     COPD (chronic obstructive pulmonary disease)     2L NC @ home    hx DVT in BLE     Hypercholesterolemia     Hypertension     Pacemaker 03/01/2022    Dr. Ramirez @ [facility]    Unspecified chronic bronchitis      Past Surgical History:   Procedure Laterality Date    APPENDECTOMY      cataract surgery       CHOLECYSTECTOMY      HYSTERECTOMY      INSERTION OF PACEMAKER Left 03/01/2022    Dr. Ashley Acuna    JOINT REPLACEMENT      left knee    SHOULDER SURGERY Bilateral      Family History   Problem Relation Age of Onset    Stroke Grandchild      Social History     Tobacco Use    Smoking status: Never    Smokeless tobacco: Never   Substance Use Topics    Alcohol use: Not Currently    Drug use: Not Currently     Review of Systems   Constitutional: Negative.    HENT: Negative.     Eyes: Negative.    Respiratory: Negative.     Cardiovascular: Negative.    Gastrointestinal: Negative.    Genitourinary: Negative.    Musculoskeletal: Negative.    Skin:  Positive for wound (skin tear right lower extremity). Negative for color change, pallor and rash.   Neurological: Negative.  Negative for dizziness, tremors, seizures, syncope, facial asymmetry, speech difficulty, weakness, light-headedness, numbness and headaches.   Hematological: Negative.    Psychiatric/Behavioral: Negative.     All other systems reviewed and are negative.    Physical Exam     Initial Vitals [04/03/23 2025]   BP Pulse Resp Temp  SpO2   (!) 168/92 85 18 98 °F (36.7 °C) 98 %      MAP       --         Physical Exam    Nursing note and vitals reviewed.  Constitutional: She appears well-developed and well-nourished. She is not diaphoretic. No distress.   HENT:   Head:       Right Ear: External ear normal.   Left Ear: External ear normal.   Nose: Nose normal.   Mouth/Throat: Oropharynx is clear and moist. No oropharyngeal exudate.   Eyes: Conjunctivae and EOM are normal. Pupils are equal, round, and reactive to light. Right eye exhibits no discharge. Left eye exhibits no discharge. No scleral icterus.   Neck: Neck supple. No JVD present.   Normal range of motion.  Cardiovascular:  Normal rate, regular rhythm, normal heart sounds and intact distal pulses.           No murmur heard.  Pulmonary/Chest: Breath sounds normal. No stridor. No respiratory distress. She has no wheezes. She has no rhonchi. She has no rales. She exhibits no tenderness.   Abdominal: Abdomen is soft. Bowel sounds are normal. She exhibits no distension. There is no abdominal tenderness.   Musculoskeletal:         General: No tenderness or edema. Normal range of motion.      Cervical back: Normal range of motion and neck supple.        Legs:      Lymphadenopathy:     She has no cervical adenopathy.   Neurological: She is alert and oriented to person, place, and time. She has normal strength. No cranial nerve deficit. GCS score is 15. GCS eye subscore is 4. GCS verbal subscore is 5. GCS motor subscore is 6.   Skin: Skin is warm and dry. Capillary refill takes less than 2 seconds. No rash noted. No erythema. No pallor.   Psychiatric: She has a normal mood and affect. Her behavior is normal.       Medical Screening Exam   See Full Note    ED Course   Procedures  Labs Reviewed   PROTIME-INR - Abnormal; Notable for the following components:       Result Value    PT 28.2 (*)     All other components within normal limits          Imaging Results              CT Head Without Contrast  (In process)                      Medications   Tdap (BOOSTRIX) vaccine injection 0.5 mL (0.5 mLs Intramuscular Given 4/3/23 2206)     Medical Decision Making:   Initial Assessment:   Initial assessment is fall with head contusion, skin tear right lower extremity.  Differential Diagnosis:   Differential diagnosis includes closed head injury, intracranial bleeding, status of anticoagulation on warfarin, skin tear, cat scratch.  Independently Interpreted Test(s):   I have ordered and independently interpreted X-rays - see summary below.       <> Summary of X-Ray Reading(s): Review of CT scan of the head indicates no evidence for intracranial bleed.  Clinical Tests:   Lab Tests: Ordered and Reviewed  The following lab test(s) were unremarkable: PT       <> Summary of Lab: PT/INR is in therapeutic range at 2.68.  Radiological Study: Ordered and Reviewed  ED Management:  Skin tear right lower extremity was cleaned and a Tegaderm dressing applied.  Patient prescribed azithromycin due to cat scratch with open wound (skin tear).  Discharge and follow-up instructions given. reviewed radiologist's report for CT scan of the head indicates no evidence of intracranial bleed.                 Clinical Impression:   Final diagnoses:  [W19.XXXA] Fall, initial encounter  [S00.03XA] Hematoma of occipital region of scalp (Primary)  [S81.811A] Noninfected skin tear of right lower extremity, initial encounter  [S00.03XA] Contusion of scalp, initial encounter        ED Disposition Condition    Discharge Stable          ED Prescriptions       Medication Sig Dispense Start Date End Date Auth. Provider    azithromycin (Z-JOHANA) 250 MG tablet Take 1 tablet (250 mg total) by mouth once daily. Take first 2 tablets together, then 1 every day until finished. 6 tablet 4/3/2023 -- Shilo Rodriguez, DO          Follow-up Information       Follow up With Specialties Details Why Contact Info    Maricel Avila MD Family Medicine Schedule an  appointment as soon as possible for a visit in 3 days To recheck; sooner if worse, not improving, or if any new symptoms. [id number]  [facility]   [place] [id number] [phone]               Shilo Rodriguez DO  04/03/23 2215

## 2023-04-04 NOTE — DISCHARGE INSTRUCTIONS
Leave bandage in place on right lower extremity until it falls off, if it does not fall off in 10-12 days you may gently remove it.

## 2023-04-04 NOTE — ED TRIAGE NOTES
PT BROUGHT IN BY [facility] WITH C/O FALL HITTING HEAD ON COMMODE LID, POSSIBLE LOC/PT REPORTS SHE TRIPPED OVER HER CAT/HEMATOMA WITH BRUISING NOTED TO LEFT OCCIPUT NOTED/LEFT LOWER LEG SKIN TEAR FROM CAT SCRATCH NOTED

## 2024-02-08 DIAGNOSIS — N63.25 UNSPECIFIED LUMP IN THE LEFT BREAST, OVERLAPPING QUADRANTS: ICD-10-CM

## 2024-02-08 DIAGNOSIS — N63.20 MASS OF LEFT BREAST, UNSPECIFIED QUADRANT: Primary | ICD-10-CM

## 2024-02-15 ENCOUNTER — HOSPITAL ENCOUNTER (OUTPATIENT)
Dept: RADIOLOGY | Facility: HOSPITAL | Age: 89
Discharge: HOME | End: 2024-02-15
Attending: FAMILY MEDICINE
Payer: MEDICARE

## 2024-02-15 ENCOUNTER — HOSPITAL ENCOUNTER (OUTPATIENT)
Dept: RADIOLOGY | Facility: HOSPITAL | Age: 89
Discharge: HOME | End: 2024-02-15
Attending: RADIOLOGY
Payer: MEDICARE

## 2024-02-15 DIAGNOSIS — R92.8 ABNORMAL MAMMOGRAM: ICD-10-CM

## 2024-02-15 DIAGNOSIS — N63.25 UNSPECIFIED LUMP IN THE LEFT BREAST, OVERLAPPING QUADRANTS: ICD-10-CM

## 2024-02-15 DIAGNOSIS — Z01.818 PRE-OP TESTING: Primary | ICD-10-CM

## 2024-02-15 DIAGNOSIS — N63.0 BREAST LUMP: Primary | ICD-10-CM

## 2024-02-15 DIAGNOSIS — N63.20 MASS OF LEFT BREAST, UNSPECIFIED QUADRANT: ICD-10-CM

## 2024-02-15 PROCEDURE — 77062 BREAST TOMOSYNTHESIS BI: CPT | Mod: TC

## 2024-02-15 PROCEDURE — 77066 DX MAMMO INCL CAD BI: CPT | Mod: 26,,, | Performed by: RADIOLOGY

## 2024-02-15 PROCEDURE — 76641 ULTRASOUND BREAST COMPLETE: CPT | Mod: TC,50

## 2024-02-15 PROCEDURE — 77062 BREAST TOMOSYNTHESIS BI: CPT | Mod: 26,,, | Performed by: RADIOLOGY

## 2024-02-15 PROCEDURE — 76641 ULTRASOUND BREAST COMPLETE: CPT | Mod: 26,50,, | Performed by: RADIOLOGY

## 2024-02-28 ENCOUNTER — OFFICE VISIT (OUTPATIENT)
Dept: VASCULAR SURGERY | Facility: CLINIC | Age: 89
End: 2024-02-28
Payer: MEDICARE

## 2024-02-28 VITALS — HEIGHT: 68 IN | BODY MASS INDEX: 28.79 KG/M2 | WEIGHT: 190 LBS

## 2024-02-28 DIAGNOSIS — Z95.0 PACEMAKER: ICD-10-CM

## 2024-02-28 DIAGNOSIS — R79.1 ABNORMAL COAGULATION PROFILE: ICD-10-CM

## 2024-02-28 DIAGNOSIS — N63.13 MASS OF LOWER OUTER QUADRANT OF RIGHT BREAST: Primary | ICD-10-CM

## 2024-02-28 DIAGNOSIS — Z86.718 HISTORY OF DVT (DEEP VEIN THROMBOSIS): ICD-10-CM

## 2024-02-28 PROCEDURE — 99203 OFFICE O/P NEW LOW 30 MIN: CPT | Mod: S$PBB,,, | Performed by: SURGERY

## 2024-02-28 PROCEDURE — 99214 OFFICE O/P EST MOD 30 MIN: CPT | Mod: PBBFAC | Performed by: SURGERY

## 2024-02-28 NOTE — PROGRESS NOTES
"Subjective:       Patient ID: Verito Weston is a 89 y.o. female.    Chief Complaint: Breast Problem  Some new patient.  Referred for abnormal mammogram associated ultrasound.  Right breast 10 cm from nipple the 7 o'clock position in his warm 0.5 cm mass with irregular margins developed since previous exam BI-RADS category 4 C    Patient is on Coumadin due to previous venous thromboses though she does have a pacemaker in place his never had a stroke or clots in the arteries of the heart she states states he has been on come off Coumadin without problems in the past as needed cardiologist is Dr. John FALL.    Plan is excisional biopsy under MAC and local covered risks and benefits bleeding infection scarring that any other intervention will be dictated by pathology  family history includes Stroke in her grandchild.  Past Medical History:   Diagnosis Date    Afib     Asthma     COPD (chronic obstructive pulmonary disease)     2L NC @ home    hx DVT in BLE     Hypercholesterolemia     Hypertension     Pacemaker 03/01/2022    Dr. Ramirez @ [facility]    Unspecified chronic bronchitis       Past Surgical History:   Procedure Laterality Date    APPENDECTOMY      cataract surgery       CHOLECYSTECTOMY      HYSTERECTOMY      INSERTION OF PACEMAKER Left 03/01/2022    Dr. Ashley Acuna    JOINT REPLACEMENT      left knee    SHOULDER SURGERY Bilateral        reports that she has never smoked. She has never used smokeless tobacco. She reports that she does not currently use alcohol. She reports that she does not currently use drugs.   HPI  Review of Systems      Objective:      Ht 5' 8" (1.727 m)   Wt 86.2 kg (190 lb)   BMI 28.89 kg/m²    Physical Exam  Constitutional:       Appearance: Normal appearance.   HENT:      Head: Normocephalic.   Cardiovascular:      Rate and Rhythm: Normal rate.      Comments: Left chest wall pacemaker  Chest:          Comments: Firm palpable mass right breast 7:00 a.m.  Lymphadenopathy: "      Upper Body:      Right upper body: No supraclavicular or axillary adenopathy.      Left upper body: No supraclavicular or axillary adenopathy.   Skin:     Capillary Refill: Capillary refill takes less than 2 seconds.   Neurological:      General: No focal deficit present.      Mental Status: She is alert.   Psychiatric:         Mood and Affect: Mood normal.         Behavior: Behavior normal.         Thought Content: Thought content normal.         Judgment: Judgment normal.         Assessment:       1. Mass of lower outer quadrant of right breast    2. Pacemaker    3. History of DVT (deep vein thrombosis)        Plan:       Excisional biopsy right breast mass under MAC and local common do this March 12th

## 2024-02-28 NOTE — PATIENT INSTRUCTIONS
[facility]      Your surgery is scheduled for 3/12 at [facility] on the ground floor of the Ambulatory building. You should arrive at 9:00 at the Ambulatory Care Center located at [address].                                                                                                                                                                                                                                                                                                                                                           Preoperative Instructions        Your pre-op lab work will be on TODAY on the 1st floor of the [facility] building.                                                                                                                                            Day of Surgery Instructions      Bring a list of all your medications with you the day of your surgery. You can also give the list to your doctor or nurse during your final clinic appointment before surgery.      Do not eat any solid foods or drink any liquids after 12:00 AM (midnight). This includes gum, hard candy, mints, and chewing tobacco.  Medications: Take any medications specified with a small sip of water the morning of your surgery.  Brush your teeth: You may brush your teeth and rinse your mouth. Do not swallow any water or toothpaste.  Clothing: A button front shirt and loose-fitting clothes are the most comfortable before and after surgery. We also recommend low-heeled shoes.  Hair: Avoid buns, ponytails, or hairpieces at the back of the head. Remove or avoid any clips, pins or bands that bind hair. Do not use hairspray. Before going to surgery, you will need to remove any wigs or hairpieces.  We will cover your hair during surgery. Your privacy regarding personal appearance will be respected.  Fingernails: Please be sure to remove all nail polish before you arrive for surgery. We  understand that tips, wraps, gels, etc., are expensive; however, we ask these products to be removed from at least one finger on each hand. Your fingertips are used to accurately monitor your oxygen level during surgery by a device called an oximeter.  Glasses and Contact Lenses: Wear glasses when possible. If contact lenses must be worn, bring a lens case and solution. If glasses are worn, bring a case for them.  Hearing Aids: If you rely on a hearing aid, wear it to the hospital on the day of surgery. This will ensure you can hear and understand everything we need to communicate with you.  Valuables: Jewelry, including body piercings, Dentures, money, and credit cards should be left at home. [facility] is not responsible for valuables that are not secured in our surgery center.  Makeup, Perfume, Creams, Lotions and Deodorants: Do not use any of these products on the day of surgery. Remove false eyelashes prior to surgery.  Implanted Medical Devices: If you have an implanted device, such as a pacemaker or AICD, bring the device information card (if you have it) with you.  Medical Equipment: If you have been fitted for a brace to wear after surgery or you have been given crutches, bring those with you to the surgery center.  Shower: Take a shower with Hibiclens® (chlorhexidine) (available over the counter). This reduces the chance of infection. PLEASE USE CHLORHEXIDINE WASH THE NIGHT BEFORE SURGERY AND THE MORNING OF SURGERY.      Medication instructions:  You may take blood pressure medication with a small drink of water the morning of surgery.    Stop taking blood thinners 4 days before surgery.  STOP COUMADIN ON MARCH 9TH      IF YOU ARE ON ANY OF THESE BLOOD THINNERS, MAKE SURE YOUR PHYSICIAN IS AWARE.  Eliquis/Apixaban            Wafarin/Coumadin,Jantoven  Xarelto/Rivaroxaban      Pletal/Cilostazol  Plavix/Clopidogrel          Pradaxa/Dibigatran      If you are diabetic      Follow the diabetic medicine  instructions you received during your pre-operative visit.  DO NOT take your insulin or diabetic medications the morning of surgery.  When you arrive at the surgical center, be sure to tell the nurse you are diabetic.    The following blood sugar medications have to be stopped prior to surgery:    Hold 24 hours prior to surgery:    Libraglutide - Saxenda, Victoza  Lixisenatide --Adlxyin  Exenatide  --  Byetta  Empaglifozin--Jardiance  Sitaglitin--Januvia    Hold 1 week prior to surgery:    Semaglutide - Ozempic, Wegouy, Rybelsus  Dulaglutide - Trulicity  Tirzepatide - Mounjaro  Exenatide (extended release inj)-- Bydureon BCise      Hold 48 hours prior to surgery:    Metformin, Glucovance, Metaglip, Fortamet, Glucophage, Riomet, Avandamet, Glimepiride            Other Items to bring with you and know      Insurance card  Identification card such as 's license, passport, or other picture ID  Copy of your advance directives  List of medications and allergies, if not already provided  Name and phone number of person to contact if your condition changes significantly. YOU CANNOT DRIVE YOURSELF HOME FROM THE HOSPITAL THE DAY OF SURGERY.  PLEASE UNDERSTAND THAT OUR OFFICE DOES NOT GIVE PATHOLOGY RESULTS OR TEST RESULTS OVER THE PHONE. THIS WILL BE DISCUSSED WITH YOU ON YOUR FOLLOW UP APPOINTMENT.          Alcohol and Surgery  We want to help you prepare for and recover from surgery as quickly and safely as possible. Be open and honest with your provider about how many drinks you have per day. Excessive alcohol use is defined as drinking more than three drinks per day. It can affect the outcome of your surgery. Binge drinking (consuming large amounts of alcohol infrequently, such as on weekends) can also affect the outcome of your surgery.  Alcohol withdrawal  If you drink more than three drinks a day, you could have a complication, called alcohol withdrawal, after surgery.  Alcohol withdrawal is a set of symptoms  that people have when they suddenly stop drinking after using alcohol  for a long time. During withdrawal, a person's central nervous system overreacts. This can cause mild symptoms such as shakiness, sweating or hallucinating. It can also cause other more serious side effects. If not treated properly, alcohol withdrawal can cause potentially life-threatening complications after surgery. This can include tremors, seizures, hallucinations, delirium tremors, and even death. Untreated alcohol withdrawal often leads to a longer stay in the hospital, potentially in the Intensive Care Unit.  Chronic heavy drinking also can interfere with several organ systems and biochemical processes in the body.  This interference can cause serious, even life-threatening complications.  Your care team can offer alcohol withdrawal treatment to help:  Decrease the risk of seizures and delirium tremors after surgery  Decrease the risk we will need to restrain you for your own safety or the safety of others  Decrease your risk of falling after surgery  Reduce the use of potent sedative medications  Reduce the time you stay in the hospital after surgery  Reduce the time you might spend on a mechanical ventilator to help you breathe  Lower incidence of organ failure and biochemical complications  Talk to a member of your care team or your primary care physician about your alcohol use if you feel you may be at risk of any of these complications.        Smoking and Surgery  Quitting smoking is extremely important for a successful surgery and recovery. Cigarette smoking compromises your immune system. This increases your risk of an infection after surgery. Quitting the habit before surgery will decrease the surgical risks associated with smoking.

## 2024-03-05 RX ORDER — SODIUM CHLORIDE 9 MG/ML
INJECTION, SOLUTION INTRAVENOUS CONTINUOUS
Status: CANCELLED | OUTPATIENT
Start: 2024-03-05

## 2024-03-05 RX ORDER — CEFAZOLIN SODIUM 2 G/50ML
2 SOLUTION INTRAVENOUS
Status: CANCELLED | OUTPATIENT
Start: 2024-03-05

## 2024-03-12 ENCOUNTER — HOSPITAL ENCOUNTER (OUTPATIENT)
Facility: HOSPITAL | Age: 89
Discharge: HOME | End: 2024-03-12
Attending: SURGERY | Admitting: SURGERY
Payer: MEDICARE

## 2024-03-12 ENCOUNTER — ANESTHESIA EVENT (OUTPATIENT)
Dept: SURGERY | Facility: HOSPITAL | Age: 89
End: 2024-03-12
Payer: MEDICARE

## 2024-03-12 ENCOUNTER — ANESTHESIA (OUTPATIENT)
Dept: SURGERY | Facility: HOSPITAL | Age: 89
End: 2024-03-12
Payer: MEDICARE

## 2024-03-12 VITALS
SYSTOLIC BLOOD PRESSURE: 137 MMHG | OXYGEN SATURATION: 96 % | HEART RATE: 68 BPM | TEMPERATURE: 98 F | DIASTOLIC BLOOD PRESSURE: 67 MMHG

## 2024-03-12 DIAGNOSIS — N63.13 MASS OF LOWER OUTER QUADRANT OF RIGHT BREAST: Primary | ICD-10-CM

## 2024-03-12 PROCEDURE — D9220A PRA ANESTHESIA: Mod: ANES,,, | Performed by: ANESTHESIOLOGY

## 2024-03-12 PROCEDURE — 88307 TISSUE EXAM BY PATHOLOGIST: CPT | Mod: TC,SUR | Performed by: SURGERY

## 2024-03-12 PROCEDURE — 36000707: Performed by: SURGERY

## 2024-03-12 PROCEDURE — D9220A PRA ANESTHESIA: Mod: CRNA,,, | Performed by: NURSE ANESTHETIST, CERTIFIED REGISTERED

## 2024-03-12 PROCEDURE — 27000177 HC AIRWAY, LARYNGEAL MASK: Performed by: ANESTHESIOLOGY

## 2024-03-12 PROCEDURE — 88377 M/PHMTRC ALYS ISHQUANT/SEMIQ: CPT | Mod: 26,,, | Performed by: PATHOLOGY

## 2024-03-12 PROCEDURE — 71000039 HC RECOVERY, EACH ADD'L HOUR: Performed by: SURGERY

## 2024-03-12 PROCEDURE — 19301 PARTIAL MASTECTOMY: CPT | Mod: RT,,, | Performed by: SURGERY

## 2024-03-12 PROCEDURE — 37000008 HC ANESTHESIA 1ST 15 MINUTES: Performed by: SURGERY

## 2024-03-12 PROCEDURE — 37000009 HC ANESTHESIA EA ADD 15 MINS: Performed by: SURGERY

## 2024-03-12 PROCEDURE — 88307 TISSUE EXAM BY PATHOLOGIST: CPT | Mod: 26,,, | Performed by: PATHOLOGY

## 2024-03-12 PROCEDURE — 71000015 HC POSTOP RECOV 1ST HR: Performed by: SURGERY

## 2024-03-12 PROCEDURE — 27000510 HC BLANKET BAIR HUGGER ANY SIZE: Performed by: ANESTHESIOLOGY

## 2024-03-12 PROCEDURE — 36000706: Performed by: SURGERY

## 2024-03-12 PROCEDURE — 88342 IMHCHEM/IMCYTCHM 1ST ANTB: CPT | Mod: 26,XU,, | Performed by: PATHOLOGY

## 2024-03-12 PROCEDURE — 27201423 OPTIME MED/SURG SUP & DEVICES STERILE SUPPLY: Performed by: SURGERY

## 2024-03-12 PROCEDURE — 63600175 PHARM REV CODE 636 W HCPCS: Performed by: NURSE ANESTHETIST, CERTIFIED REGISTERED

## 2024-03-12 PROCEDURE — 71000033 HC RECOVERY, INTIAL HOUR: Performed by: SURGERY

## 2024-03-12 PROCEDURE — 25000003 PHARM REV CODE 250: Performed by: SURGERY

## 2024-03-12 PROCEDURE — 27000716 HC OXISENSOR PROBE, ANY SIZE: Performed by: ANESTHESIOLOGY

## 2024-03-12 PROCEDURE — 88360 TUMOR IMMUNOHISTOCHEM/MANUAL: CPT | Mod: 26,,, | Performed by: PATHOLOGY

## 2024-03-12 PROCEDURE — 25000003 PHARM REV CODE 250: Performed by: NURSE ANESTHETIST, CERTIFIED REGISTERED

## 2024-03-12 RX ORDER — MORPHINE SULFATE 10 MG/ML
4 INJECTION INTRAMUSCULAR; INTRAVENOUS; SUBCUTANEOUS EVERY 5 MIN PRN
Status: DISCONTINUED | OUTPATIENT
Start: 2024-03-12 | End: 2024-03-12 | Stop reason: HOSPADM

## 2024-03-12 RX ORDER — LIDOCAINE HYDROCHLORIDE 20 MG/ML
INJECTION, SOLUTION EPIDURAL; INFILTRATION; INTRACAUDAL; PERINEURAL
Status: DISCONTINUED | OUTPATIENT
Start: 2024-03-12 | End: 2024-03-12

## 2024-03-12 RX ORDER — HYDROMORPHONE HYDROCHLORIDE 2 MG/ML
0.5 INJECTION, SOLUTION INTRAMUSCULAR; INTRAVENOUS; SUBCUTANEOUS EVERY 5 MIN PRN
Status: DISCONTINUED | OUTPATIENT
Start: 2024-03-12 | End: 2024-03-12 | Stop reason: HOSPADM

## 2024-03-12 RX ORDER — DIPHENHYDRAMINE HYDROCHLORIDE 50 MG/ML
25 INJECTION INTRAMUSCULAR; INTRAVENOUS EVERY 6 HOURS PRN
Status: DISCONTINUED | OUTPATIENT
Start: 2024-03-12 | End: 2024-03-12 | Stop reason: HOSPADM

## 2024-03-12 RX ORDER — SODIUM CHLORIDE 9 MG/ML
INJECTION, SOLUTION INTRAVENOUS CONTINUOUS
Status: DISCONTINUED | OUTPATIENT
Start: 2024-03-12 | End: 2024-03-12 | Stop reason: HOSPADM

## 2024-03-12 RX ORDER — CEFAZOLIN SODIUM 1 G/3ML
INJECTION, POWDER, FOR SOLUTION INTRAMUSCULAR; INTRAVENOUS
Status: DISCONTINUED | OUTPATIENT
Start: 2024-03-12 | End: 2024-03-12

## 2024-03-12 RX ORDER — MEPERIDINE HYDROCHLORIDE 25 MG/ML
25 INJECTION INTRAMUSCULAR; INTRAVENOUS; SUBCUTANEOUS EVERY 10 MIN PRN
Status: DISCONTINUED | OUTPATIENT
Start: 2024-03-12 | End: 2024-03-12 | Stop reason: HOSPADM

## 2024-03-12 RX ORDER — ONDANSETRON HYDROCHLORIDE 2 MG/ML
4 INJECTION, SOLUTION INTRAVENOUS DAILY PRN
Status: DISCONTINUED | OUTPATIENT
Start: 2024-03-12 | End: 2024-03-12 | Stop reason: HOSPADM

## 2024-03-12 RX ORDER — PROPOFOL 10 MG/ML
VIAL (ML) INTRAVENOUS
Status: DISCONTINUED | OUTPATIENT
Start: 2024-03-12 | End: 2024-03-12

## 2024-03-12 RX ORDER — HYDROCODONE BITARTRATE AND ACETAMINOPHEN 5; 325 MG/1; MG/1
1 TABLET ORAL EVERY 6 HOURS PRN
Qty: 12 TABLET | Refills: 0 | Status: ON HOLD | OUTPATIENT
Start: 2024-03-12 | End: 2024-05-24 | Stop reason: HOSPADM

## 2024-03-12 RX ORDER — FENTANYL CITRATE 50 UG/ML
INJECTION, SOLUTION INTRAMUSCULAR; INTRAVENOUS
Status: DISCONTINUED | OUTPATIENT
Start: 2024-03-12 | End: 2024-03-12

## 2024-03-12 RX ADMIN — LIDOCAINE HYDROCHLORIDE 50 MG: 20 INJECTION, SOLUTION INTRAVENOUS at 01:03

## 2024-03-12 RX ADMIN — CEFAZOLIN 2 G: 1 INJECTION, POWDER, FOR SOLUTION INTRAMUSCULAR; INTRAVENOUS; PARENTERAL at 01:03

## 2024-03-12 RX ADMIN — PROPOFOL 100 MG: 10 INJECTION, EMULSION INTRAVENOUS at 01:03

## 2024-03-12 RX ADMIN — SODIUM CHLORIDE: 9 INJECTION, SOLUTION INTRAVENOUS at 11:03

## 2024-03-12 RX ADMIN — FENTANYL CITRATE 100 MCG: 50 INJECTION INTRAMUSCULAR; INTRAVENOUS at 01:03

## 2024-03-12 NOTE — PROGRESS NOTES
1411 RECEIVED TO RR AWAKE,A LERT. TALKATIVE. COLOR PINK. NO RESP. DISTRESS NOTED. HOB ELEVATED. DRESSING RIGHT LOWER BREAST D/I, NO SWELLING OR BLEEDING NOTED. IV INFUSING LEFT AC 20G. CATH. SCD HOSE IN PROGRESS. DR. AGUAYO AT BEDSIDE SPEAKING WITH PATIENT. SEE FLOW SHEET.      1440 AWAKE, ALERT, TALKATIVE. DRESSING TO OP-SITE D/I, NO SWELLING. C/O SORENESS WHEN AREA TOUCHED. WAITING ON POST-OP ORDERS FROM DR. AGUAYO, MADE AWARE VIA TEXT.    1515 ORDERS RECEIVED TO DISCHARGE HOME. NO SWELLING AT OP-SITE. SCANT RED DRAINAGE NOTED. ON DRESSING. TRANSFERRED TO ROOM WITH OUT DISTRESS NOTED.

## 2024-03-12 NOTE — ANESTHESIA POSTPROCEDURE EVALUATION
Anesthesia Post Evaluation    Patient: Verito Weston    Procedure(s) Performed: Procedure(s) (LRB):  BIOPSY, BREAST (Right)    Final Anesthesia Type: general      Patient location during evaluation: PACU  Post-procedure vital signs: reviewed and stable  Pain management: adequate  Airway patency: patent    PONV status at discharge: No PONV  Anesthetic complications: no      Cardiovascular status: hemodynamically stable  Respiratory status: unassisted  Hydration status: euvolemic  Follow-up not needed.              Vitals Value Taken Time   /67 03/12/24 1546   Temp 36.8 °C (98.3 °F) 03/12/24 1415   Pulse 67 03/12/24 1559   Resp 0 03/12/24 1536   SpO2 96 % 03/12/24 1559   Vitals shown include unvalidated device data.      Event Time   Out of Recovery 15:15:00         Pain/Kandi Score: Kandi Score: 10 (3/12/2024  3:25 PM)

## 2024-03-12 NOTE — ANESTHESIA PROCEDURE NOTES
Intubation    Date/Time: 3/12/2024 1:31 PM    Performed by: Angelo Amdaor CRNA  Authorized by: Ray Martinez MD    Intubation:     Induction:  Intravenous    Intubated:  Postinduction    Mask Ventilation:  Easy mask    Attempts:  1    Attempted By:  CRNA    Difficult Airway Encountered?: No      Complications:  None    Airway Device:  Supraglottic airway/LMA    Airway Device Size:  4.0    Style/Cuff Inflation:  Cuffed (inflated to minimal occlusive pressure)    Placement Verified By:  Capnometry    Complicating Factors:  None    Findings Post-Intubation:  BS equal bilateral

## 2024-03-12 NOTE — DISCHARGE SUMMARY
[facility] - Periop Services  Discharge Note  Short Stay    Procedure(s) (LRB):  BIOPSY, BREAST (Right)      OUTCOME: Patient tolerated treatment/procedure well without complication and is now ready for discharge.    DISPOSITION: Home or Self Care    FINAL DIAGNOSIS:  <principal problem not specified> right breast mass, procedure: Excisional biopsy right breast mass    FOLLOWUP: In clinic    DISCHARGE INSTRUCTIONS:  No discharge procedures on file.     TIME SPENT ON DISCHARGE: 15 minutes

## 2024-03-12 NOTE — OP NOTE
[facility] - Periop Services  Surgery Department  Operative Note    SUMMARY     Date of Procedure: 3/12/2024     Procedure: Procedure(s) (LRB):  BIOPSY, BREAST (Right)     Surgeon(s) and Role:     * Ashleigh Treadwell MD - Primary    Assisting Surgeon: None    Pre-Operative Diagnosis: Mass of lower outer quadrant of right breast [N63.13]    Post-Operative Diagnosis: Post-Op Diagnosis Codes:     * Mass of lower outer quadrant of right breast [N63.13]    Anesthesia: General/MAC    Operative Findings (including complications, if any):  Mass lower outer quadrant right breast    Description of Technical Procedures:  Taken operative suite right chest breast axilla prepped draped usual sterile fashion after having done this we palpated identified the mass we made a curvilinear incision overlying it we circumferentially dissected excise utilize cautery right lower outer quadrant of the right breast confirmed we had palpable mass within the specimen we marked the anterior cranial lateral margins with suture.  We irrigated we assured hemostasis with cautery closed the deep soft tissues with interrupted 3-0 chromic followed by running 4-0 Monocryl sterile dressing applied    Significant Surgical Tasks Conducted by the Assistant(s), if Applicable: skin closure    Estimated Blood Loss (EBL): * No values recorded between 3/12/2024  1:42 PM and 3/12/2024  2:04 PM *5cc           Implants: * No implants in log *    Specimens:   Specimen (24h ago, onward)       Start     Ordered    03/12/24 1348  Surgical Pathology  RELEASE UPON ORDERING         03/12/24 1348                            Condition: Good    Disposition: PACU - hemodynamically stable.    Attestation: I was present and scrubbed for the entire procedure.

## 2024-03-12 NOTE — ANESTHESIA PREPROCEDURE EVALUATION
03/12/2024  Verito Weston is a 89 y.o., female.      Pre-op Assessment    I have reviewed the Patient Summary Reports.    I have reviewed the NPO Status.   I have reviewed the Medications.     Review of Systems         Anesthesia Plan  Type of Anesthesia, risks & benefits discussed:    Anesthesia Type: Gen ETT  Intra-op Monitoring Plan: Standard ASA Monitors  Post Op Pain Control Plan: IV/PO Opioids PRN  Induction:  IV  Informed Consent: Informed consent signed with the Patient and all parties understand the risks and agree with anesthesia plan.  All questions answered.   ASA Score: 3    Ready For Surgery From Anesthesia Perspective.     .  NPO greater than 8 hours  No anesthetic complications  NKDA    Hct 41    Medical History   Hypertension hx DVT in BLE   Asthma COPD (chronic obstructive pulmonary disease)   Hypercholesterolemia Afib   Pacemaker Unspecified chronic bronchitis   Bilateral shoulder pain  H/o arrhythmia  Advanced age  Home oxygen (3-4 L at night and PRN)    Airway exam deferred (COVID precautions); adequate ROM at neck.

## 2024-03-12 NOTE — TRANSFER OF CARE
Anesthesia Transfer of Care Note    Patient: Verito Weston    Procedure(s) Performed: Procedure(s) (LRB):  BIOPSY, BREAST (Right)    Patient location: PACU    Anesthesia Type: general    Transport from OR: Transported from OR on room air with adequate spontaneous ventilation    Post pain: adequate analgesia    Post assessment: no apparent anesthetic complications    Post vital signs: stable    Level of consciousness: lethargic    Nausea/Vomiting: no nausea/vomiting    Complications: none    Transfer of care protocol was followed      Last vitals: Visit Vitals  BP (!) 142/81   Pulse 71   Temp 36.8 °C (98.3 °F) (Oral)   Resp 16   SpO2 96%   Breastfeeding No

## 2024-03-15 LAB
DHEA SERPL-MCNC: NORMAL
ESTROGEN SERPL-MCNC: NORMAL PG/ML
INSULIN SERPL-ACNC: NORMAL U[IU]/ML
LAB AP GROSS DESCRIPTION: NORMAL
LAB AP LABORATORY NOTES: NORMAL
LAB AP PREDICTIVE MARKER TESTING: NORMAL
LAB AP SYNOPTIC CHECKLIST: NORMAL
T3RU NFR SERPL: NORMAL %

## 2024-03-28 ENCOUNTER — OFFICE VISIT (OUTPATIENT)
Dept: VASCULAR SURGERY | Facility: CLINIC | Age: 89
End: 2024-03-28
Payer: MEDICARE

## 2024-03-28 VITALS — WEIGHT: 190.06 LBS | BODY MASS INDEX: 28.8 KG/M2 | HEIGHT: 68 IN

## 2024-03-28 DIAGNOSIS — C50.511 MALIGNANT NEOPLASM OF LOWER-OUTER QUADRANT OF RIGHT BREAST OF FEMALE, ESTROGEN RECEPTOR POSITIVE: Primary | ICD-10-CM

## 2024-03-28 DIAGNOSIS — Z17.0 MALIGNANT NEOPLASM OF LOWER-OUTER QUADRANT OF RIGHT BREAST OF FEMALE, ESTROGEN RECEPTOR POSITIVE: Primary | ICD-10-CM

## 2024-03-28 PROCEDURE — 99213 OFFICE O/P EST LOW 20 MIN: CPT | Mod: PBBFAC | Performed by: SURGERY

## 2024-03-28 PROCEDURE — 99024 POSTOP FOLLOW-UP VISIT: CPT | Mod: S$PBB,,, | Performed by: SURGERY

## 2024-03-28 NOTE — PROGRESS NOTES
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    Margins negative   ER positive AK positive HER2 Simon negative    Clinically right axillas negative us with the patient and her significant family member usual course of action be lashaun evaluation, however at 89 years of age high likelihood chemotherapy not be utilize, an option be oncology evaluation and possible antihormone therapy alone, plus or minus radiation as Oncology deemed appropriate    Her  was treated with by Dr. Vera for leukemia and did not do well with chemotherapy    Patient understands see her back on as-needed basis we will get appointment with Oncology available for any other surgical needs as deemed needed

## 2024-04-03 ENCOUNTER — DOCUMENTATION ONLY (OUTPATIENT)
Dept: VASCULAR SURGERY | Facility: CLINIC | Age: 89
End: 2024-04-03
Payer: MEDICARE

## 2024-04-29 ENCOUNTER — HOSPITAL ENCOUNTER (EMERGENCY)
Facility: HOSPITAL | Age: 89
Discharge: ANOTHER HOSPITAL | End: 2024-04-29
Attending: INTERNAL MEDICINE
Payer: MEDICARE

## 2024-04-29 VITALS
TEMPERATURE: 98 F | HEIGHT: 68 IN | DIASTOLIC BLOOD PRESSURE: 90 MMHG | HEART RATE: 71 BPM | OXYGEN SATURATION: 98 % | WEIGHT: 180 LBS | BODY MASS INDEX: 27.28 KG/M2 | RESPIRATION RATE: 16 BRPM | SYSTOLIC BLOOD PRESSURE: 129 MMHG

## 2024-04-29 DIAGNOSIS — J44.1 COPD WITH ACUTE EXACERBATION: ICD-10-CM

## 2024-04-29 DIAGNOSIS — J96.21 ACUTE ON CHRONIC RESPIRATORY FAILURE WITH HYPOXIA: ICD-10-CM

## 2024-04-29 DIAGNOSIS — R31.9 URINARY TRACT INFECTION WITH HEMATURIA, SITE UNSPECIFIED: ICD-10-CM

## 2024-04-29 DIAGNOSIS — N39.0 URINARY TRACT INFECTION WITH HEMATURIA, SITE UNSPECIFIED: ICD-10-CM

## 2024-04-29 DIAGNOSIS — R55 NEAR SYNCOPE: ICD-10-CM

## 2024-04-29 DIAGNOSIS — I26.99 BILATERAL PULMONARY EMBOLISM: Primary | ICD-10-CM

## 2024-04-29 LAB
ALBUMIN SERPL BCP-MCNC: 3.3 G/DL (ref 3.5–5)
ALBUMIN/GLOB SERPL: 1 {RATIO}
ALP SERPL-CCNC: 95 U/L (ref 55–142)
ALT SERPL W P-5'-P-CCNC: 17 U/L (ref 13–56)
ANION GAP SERPL CALCULATED.3IONS-SCNC: 12 MMOL/L (ref 7–16)
APTT PPP: 29.4 SECONDS (ref 25.2–37.3)
AST SERPL W P-5'-P-CCNC: 17 U/L (ref 15–37)
BACTERIA #/AREA URNS HPF: ABNORMAL /HPF
BASOPHILS # BLD AUTO: 0.1 K/UL (ref 0–0.2)
BASOPHILS NFR BLD AUTO: 0.9 % (ref 0–1)
BILIRUB SERPL-MCNC: 1.1 MG/DL (ref ?–1.2)
BILIRUB UR QL STRIP: NEGATIVE
BUN SERPL-MCNC: 16 MG/DL (ref 7–18)
BUN/CREAT SERPL: 22 (ref 6–20)
CALCIUM SERPL-MCNC: 8.8 MG/DL (ref 8.5–10.1)
CHLORIDE SERPL-SCNC: 104 MMOL/L (ref 98–107)
CLARITY UR: CLEAR
CO2 SERPL-SCNC: 27 MMOL/L (ref 21–32)
COLOR UR: YELLOW
CREAT SERPL-MCNC: 0.72 MG/DL (ref 0.55–1.02)
D DIMER PPP FEU-MCNC: >20 ΜG/ML (ref 0–0.47)
DIFFERENTIAL METHOD BLD: ABNORMAL
EGFR (NO RACE VARIABLE) (RUSH/TITUS): 78 ML/MIN/1.73M2
EOSINOPHIL # BLD AUTO: 0.62 K/UL (ref 0–0.5)
EOSINOPHIL NFR BLD AUTO: 5.9 % (ref 1–4)
ERYTHROCYTE [DISTWIDTH] IN BLOOD BY AUTOMATED COUNT: 14.6 % (ref 11.5–14.5)
GLOBULIN SER-MCNC: 3.2 G/DL (ref 2–4)
GLUCOSE SERPL-MCNC: 152 MG/DL (ref 74–106)
GLUCOSE UR STRIP-MCNC: NEGATIVE MG/DL
HCO3 UR-SCNC: 25.1 MMOL/L (ref 21–28)
HCT VFR BLD AUTO: 37.5 % (ref 38–47)
HGB BLD-MCNC: 11.8 G/DL (ref 12–16)
IMM GRANULOCYTES # BLD AUTO: 0.04 K/UL (ref 0–0.04)
IMM GRANULOCYTES NFR BLD: 0.4 % (ref 0–0.4)
INR BLD: 1.37
KETONES UR STRIP-SCNC: ABNORMAL MG/DL
LEUKOCYTE ESTERASE UR QL STRIP: NEGATIVE
LYMPHOCYTES # BLD AUTO: 1.81 K/UL (ref 1–4.8)
LYMPHOCYTES NFR BLD AUTO: 17.2 % (ref 27–41)
MAGNESIUM SERPL-MCNC: 1.8 MG/DL (ref 1.7–2.3)
MCH RBC QN AUTO: 28.9 PG (ref 27–31)
MCHC RBC AUTO-ENTMCNC: 31.5 G/DL (ref 32–36)
MCV RBC AUTO: 91.7 FL (ref 80–96)
MONOCYTES # BLD AUTO: 0.89 K/UL (ref 0–0.8)
MONOCYTES NFR BLD AUTO: 8.4 % (ref 2–6)
MPC BLD CALC-MCNC: 10.3 FL (ref 9.4–12.4)
NEUTROPHILS # BLD AUTO: 7.08 K/UL (ref 1.8–7.7)
NEUTROPHILS NFR BLD AUTO: 67.2 % (ref 53–65)
NITRITE UR QL STRIP: POSITIVE
NRBC # BLD AUTO: 0 X10E3/UL
NRBC, AUTO (.00): 0 %
NT-PROBNP SERPL-MCNC: 149 PG/ML (ref 1–450)
PCO2 BLDA: 37 MMHG (ref 35–48)
PH SMN: 7.44 [PH] (ref 7.35–7.45)
PH UR STRIP: 5.5 PH UNITS
PLATELET # BLD AUTO: 173 K/UL (ref 150–400)
PO2 BLDA: 53 MMHG (ref 83–108)
POC BASE EXCESS: 1.1 MMOL/L (ref -2–3)
POC SATURATED O2: 88 %
POTASSIUM SERPL-SCNC: 4.1 MMOL/L (ref 3.5–5.1)
PROT SERPL-MCNC: 6.5 G/DL (ref 6.4–8.2)
PROT UR QL STRIP: 30
PROTHROMBIN TIME: 16.7 SECONDS (ref 11.7–14.7)
RBC # BLD AUTO: 4.09 M/UL (ref 4.2–5.4)
RBC # UR STRIP: ABNORMAL /UL
RBC #/AREA URNS HPF: ABNORMAL /HPF
SODIUM SERPL-SCNC: 139 MMOL/L (ref 136–145)
SP GR UR STRIP: 1.02
SQUAMOUS #/AREA URNS LPF: ABNORMAL /LPF
TROPONIN I SERPL DL<=0.01 NG/ML-MCNC: 7.9 PG/ML
UROBILINOGEN UR STRIP-ACNC: 0.2 MG/DL
WBC # BLD AUTO: 10.54 K/UL (ref 4.5–11)
WBC #/AREA URNS HPF: ABNORMAL /HPF
YEAST #/AREA URNS HPF: ABNORMAL /HPF

## 2024-04-29 PROCEDURE — 96375 TX/PRO/DX INJ NEW DRUG ADDON: CPT | Mod: 59

## 2024-04-29 PROCEDURE — 83880 ASSAY OF NATRIURETIC PEPTIDE: CPT | Performed by: INTERNAL MEDICINE

## 2024-04-29 PROCEDURE — 81003 URINALYSIS AUTO W/O SCOPE: CPT | Performed by: INTERNAL MEDICINE

## 2024-04-29 PROCEDURE — 85610 PROTHROMBIN TIME: CPT | Performed by: INTERNAL MEDICINE

## 2024-04-29 PROCEDURE — 83735 ASSAY OF MAGNESIUM: CPT | Performed by: INTERNAL MEDICINE

## 2024-04-29 PROCEDURE — 96365 THER/PROPH/DIAG IV INF INIT: CPT

## 2024-04-29 PROCEDURE — 85379 FIBRIN DEGRADATION QUANT: CPT | Performed by: INTERNAL MEDICINE

## 2024-04-29 PROCEDURE — 99285 EMERGENCY DEPT VISIT HI MDM: CPT | Mod: 25

## 2024-04-29 PROCEDURE — 84484 ASSAY OF TROPONIN QUANT: CPT | Performed by: INTERNAL MEDICINE

## 2024-04-29 PROCEDURE — 94640 AIRWAY INHALATION TREATMENT: CPT

## 2024-04-29 PROCEDURE — 93010 ELECTROCARDIOGRAM REPORT: CPT | Mod: ,,, | Performed by: INTERNAL MEDICINE

## 2024-04-29 PROCEDURE — 99285 EMERGENCY DEPT VISIT HI MDM: CPT | Performed by: INTERNAL MEDICINE

## 2024-04-29 PROCEDURE — 27000221 HC OXYGEN, UP TO 24 HOURS

## 2024-04-29 PROCEDURE — 85730 THROMBOPLASTIN TIME PARTIAL: CPT | Performed by: INTERNAL MEDICINE

## 2024-04-29 PROCEDURE — 36415 COLL VENOUS BLD VENIPUNCTURE: CPT | Performed by: INTERNAL MEDICINE

## 2024-04-29 PROCEDURE — 93005 ELECTROCARDIOGRAM TRACING: CPT

## 2024-04-29 PROCEDURE — 99900035 HC TECH TIME PER 15 MIN (STAT)

## 2024-04-29 PROCEDURE — 63600175 PHARM REV CODE 636 W HCPCS: Performed by: INTERNAL MEDICINE

## 2024-04-29 PROCEDURE — 80053 COMPREHEN METABOLIC PANEL: CPT | Performed by: INTERNAL MEDICINE

## 2024-04-29 PROCEDURE — 82803 BLOOD GASES ANY COMBINATION: CPT

## 2024-04-29 PROCEDURE — 96372 THER/PROPH/DIAG INJ SC/IM: CPT | Mod: 59 | Performed by: INTERNAL MEDICINE

## 2024-04-29 PROCEDURE — 25500020 PHARM REV CODE 255: Performed by: INTERNAL MEDICINE

## 2024-04-29 PROCEDURE — 25000242 PHARM REV CODE 250 ALT 637 W/ HCPCS: Performed by: INTERNAL MEDICINE

## 2024-04-29 PROCEDURE — 87086 URINE CULTURE/COLONY COUNT: CPT | Performed by: INTERNAL MEDICINE

## 2024-04-29 PROCEDURE — 36600 WITHDRAWAL OF ARTERIAL BLOOD: CPT

## 2024-04-29 PROCEDURE — 85025 COMPLETE CBC W/AUTO DIFF WBC: CPT | Performed by: INTERNAL MEDICINE

## 2024-04-29 PROCEDURE — 99285 EMERGENCY DEPT VISIT HI MDM: CPT | Mod: EDII,,, | Performed by: INTERNAL MEDICINE

## 2024-04-29 PROCEDURE — 25000003 PHARM REV CODE 250: Performed by: INTERNAL MEDICINE

## 2024-04-29 RX ORDER — ENOXAPARIN SODIUM 100 MG/ML
1 INJECTION SUBCUTANEOUS
Status: COMPLETED | OUTPATIENT
Start: 2024-04-29 | End: 2024-04-29

## 2024-04-29 RX ORDER — TAMOXIFEN CITRATE 20 MG/1
20 TABLET ORAL DAILY
COMMUNITY

## 2024-04-29 RX ORDER — ONDANSETRON HYDROCHLORIDE 2 MG/ML
4 INJECTION, SOLUTION INTRAVENOUS
Status: COMPLETED | OUTPATIENT
Start: 2024-04-29 | End: 2024-04-29

## 2024-04-29 RX ORDER — HEPARIN SODIUM,PORCINE/D5W 25000/250
0-24.5 INTRAVENOUS SOLUTION INTRAVENOUS CONTINUOUS
Status: DISCONTINUED | OUTPATIENT
Start: 2024-04-29 | End: 2024-04-29 | Stop reason: HOSPADM

## 2024-04-29 RX ORDER — IPRATROPIUM BROMIDE AND ALBUTEROL SULFATE 2.5; .5 MG/3ML; MG/3ML
3 SOLUTION RESPIRATORY (INHALATION)
Status: COMPLETED | OUTPATIENT
Start: 2024-04-29 | End: 2024-04-29

## 2024-04-29 RX ADMIN — ENOXAPARIN SODIUM 80 MG: 80 INJECTION SUBCUTANEOUS at 01:04

## 2024-04-29 RX ADMIN — DEXTROSE MONOHYDRATE 1 G: 5 INJECTION INTRAVENOUS at 01:04

## 2024-04-29 RX ADMIN — HEPARIN SODIUM 18 UNITS/KG/HR: 10000 INJECTION, SOLUTION INTRAVENOUS at 02:04

## 2024-04-29 RX ADMIN — IPRATROPIUM BROMIDE AND ALBUTEROL SULFATE 3 ML: .5; 3 SOLUTION RESPIRATORY (INHALATION) at 12:04

## 2024-04-29 RX ADMIN — ONDANSETRON 4 MG: 2 INJECTION INTRAMUSCULAR; INTRAVENOUS at 12:04

## 2024-04-29 RX ADMIN — IOPAMIDOL 100 ML: 755 INJECTION, SOLUTION INTRAVENOUS at 01:04

## 2024-04-29 NOTE — ED NOTES
Pt has hx of copd- pt states she is supposed to wear o2 all the time but does not- resp tech elena garrett at bedside for abg- [facility]- list faxed and received- nurse  marcial neves rn updated medications

## 2024-04-29 NOTE — ED NOTES
[facility] CALLED PT ACCEPTED TO [facility]- DR LIAM FORTE ACCEPTING -NUMBER FOR REPORT [phone]

## 2024-04-29 NOTE — ED PROVIDER NOTES
Encounter Date: 4/29/2024       History     Chief Complaint   Patient presents with    near syncope     Patient states she had a dizzy episode nausea and slid to the floor at home.  Has a history walking with a walker but did not hit her head or lose consciousness.  Does have history of syncope in the past.  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  Recently had a biopsy of the breast on the right that showed invasive ductal carcinoma.  Denies any chest pain shortness breath fever chills.    Patient with COPD longstanding on home oxygen 2 L          Review of patient's allergies indicates:  No Known Allergies  Past Medical History:   Diagnosis Date    Afib     Asthma     Cancer     right breast    COPD (chronic obstructive pulmonary disease)     2L NC @ home    hx DVT in BLE     Hypercholesterolemia     Hypertension     Mass of lower outer quadrant of right breast 03/12/2024    Pacemaker 03/01/2022    Dr. Ramirez @ [facility]    Unspecified chronic bronchitis      Past Surgical History:   Procedure Laterality Date    APPENDECTOMY      BREAST BIOPSY Right 3/12/2024    Procedure: BIOPSY, BREAST;  Surgeon: Ashleigh Treadwell MD;  Location: [facility];  Service: General;  Laterality: Right;    cataract surgery       CHOLECYSTECTOMY      HYSTERECTOMY      INSERTION OF PACEMAKER Left 03/01/2022    Dr. Ramirez @ [facility]    JOINT REPLACEMENT      left knee    SHOULDER SURGERY Bilateral      Family History   Problem Relation Name Age of Onset    Stroke Grandchild       Social History     Tobacco Use    Smoking status: Never    Smokeless tobacco: Never   Substance Use Topics    Alcohol use: Not Currently    Drug use: Not Currently     Review of Systems   Constitutional:  Negative for fever.   HENT:  Negative for sore throat.    Respiratory:  Negative for shortness of breath.    Cardiovascular:  Negative for chest pain.   Gastrointestinal:  Negative for nausea.   Genitourinary:   Negative for dysuria.   Musculoskeletal:  Negative for back pain.   Skin:  Negative for rash.   Neurological:  Negative for weakness.   Hematological:  Does not bruise/bleed easily.       Physical Exam     Initial Vitals [04/29/24 1218]   BP Pulse Resp Temp SpO2   130/72 78 18 98.2 °F (36.8 °C) (!) 89 %      MAP       --         Physical Exam    Neck: Trachea normal. Neck supple.    Full passive range of motion without pain.     Cardiovascular:  Regular rhythm, normal heart sounds and normal pulses.           Pulmonary/Chest: Effort normal. She has rhonchi.   Abdominal: Abdomen is soft and protuberant. Bowel sounds are normal. There is no abdominal tenderness.   Musculoskeletal:      Cervical back: Full passive range of motion without pain and neck supple.     Neurological: She has normal strength. No cranial nerve deficit. GCS eye subscore is 4. GCS verbal subscore is 5. GCS motor subscore is 6.         Medical Screening Exam   See Full Note    ED Course   Procedures  Labs Reviewed   COMPREHENSIVE METABOLIC PANEL - Abnormal; Notable for the following components:       Result Value    Glucose 152 (*)     BUN/Creatinine Ratio 22 (*)     Albumin 3.3 (*)     All other components within normal limits   CBC WITH DIFFERENTIAL - Abnormal; Notable for the following components:    RBC 4.09 (*)     Hemoglobin 11.8 (*)     Hematocrit 37.5 (*)     MCHC 31.5 (*)     RDW 14.6 (*)     Neutrophils % 67.2 (*)     Lymphocytes % 17.2 (*)     Monocytes % 8.4 (*)     Eosinophils % 5.9 (*)     Monocytes, Absolute 0.89 (*)     Eosinophils, Absolute 0.62 (*)     All other components within normal limits   URINALYSIS - Abnormal; Notable for the following components:    Nitrites, UA Positive (*)     Protein, UA 30 (*)     Blood, UA Moderate (*)     All other components within normal limits   PROTIME-INR - Abnormal; Notable for the following components:    PT 16.7 (*)     All other components within normal limits   D DIMER, QUANTITATIVE -  Abnormal; Notable for the following components:    D-Dimer >20.00 (*)     All other components within normal limits   URINALYSIS, MICROSCOPIC - Abnormal; Notable for the following components:    WBC, UA 11-15 (*)     RBC, UA 10-15 (*)     Bacteria, UA Many (*)     Yeast, UA Rare (*)     Squamous Epithelial Cells, UA Few (*)     All other components within normal limits   NT-PRO NATRIURETIC PEPTIDE - Normal   MAGNESIUM - Normal   TROPONIN I - Normal   APTT - Normal   CULTURE, URINE   CBC W/ AUTO DIFFERENTIAL    Narrative:     The following orders were created for panel order CBC auto differential.  Procedure                               Abnormality         Status                     ---------                               -----------         ------                     CBC with Differential[[id number]]       Abnormal            Final result                 Please view results for these tests on the individual orders.     EKG Readings: (Independently Interpreted)   Initial Reading: No STEMI. Previous EKG: Compared with most recent EKG Previous EKG Date: 12/09/2021. Rhythm: Normal Sinus Rhythm. Heart Rate: 79. Ectopy: No Ectopy. Conduction: Normal. ST Segments: Normal ST Segments. T Waves: Normal. Axis: Normal. Clinical Impression: Normal Sinus Rhythm   Normal sinus rhythm heart rate of 79 and no acute ischemic changes       Imaging Results              CTA Chest Non-Coronary (PE Studies) (Final result)  Result time 04/29/24 13:54:07      Final result by Walter Martinez DO (04/29/24 13:54:07)                   Impression:      Bilateral pulmonary emboli.    Findings communicated to Sebastian Vasquez at the time of dictation.      Electronically signed by: Walter Martinez  Date:    04/29/2024  Time:    13:54               Narrative:    EXAMINATION:  CTA CHEST NON CORONARY (PE STUDIES)    CLINICAL HISTORY:  Pulmonary embolism (PE) suspected, positive D-dimer;    TECHNIQUE:  Low dose axial images, sagittal and coronal  reformations were obtained from the thoracic inlet to the lung bases following the IV administration of 100 mL of Omnipaque 350.  Contrast timing was optimized to evaluate the pulmonary arteries.  MIP images were performed.    Dose reduction: The CT exam was performed using one or more dose reduction techniques: Automatic exposure control, automated adjustment of the MA and/or kVP according to patient size, or use of iterative reconstruction technique.    COMPARISON:  4/29/24    FINDINGS:  Heart and Great Vessels: Thrombus located within the branches of the left-sided pulmonary artery extending into the left upper lobe and left lower lobe.  Additional thrombus located within the main right-sided pulmonary artery extending into the branches of the right pulmonary artery extending into the right upper lobe and right lower lobe.  Mild dilatation the right atrium and right-sided ventricle.  Heart is enlarged, similar to 04/29/2024.    Thoracic Adenopathy: None.    Lungs: Mild peripheral scarring scattered throughout the lungs.    Visualized Upper Abdomen:Subcentimeter cyst or hemangioma located within the right hepatic lobe.  Gallbladder removed.    Bones: Multilevel degenerative change    Miscellaneous: None.                                       CT Head Without Contrast (Final result)  Result time 04/29/24 12:56:59      Final result by Jose Armando Baxter MD (04/29/24 12:56:59)                   Impression:      No acute intracranial abnormality identified.      Electronically signed by: Jose Armando Baxter  Date:    04/29/2024  Time:    12:56               Narrative:    EXAMINATION:  CT HEAD WITHOUT CONTRAST    CLINICAL HISTORY:  Dizziness, persistent/recurrent, cardiac or vascular cause suspected;    TECHNIQUE:  CT of the head performed without the use of intravenous contrast.  The CT examination was performed using one or more of the following dose reduction techniques: Automated exposure control, adjustment of the mA and kV  according to patient's size, use of acute or iterative reconstruction techniques.    COMPARISON:  04/03/2023    FINDINGS:  No acute intracranial hemorrhage identified.  Chronic microvascular ischemic change in global parenchymal volume loss is again seen and similar.  No large vessel acute infarct.  Vascular calcifications.  No intra or extra-axial collection.  Calvarium intact.                                       X-Ray Chest AP Portable (Final result)  Result time 04/29/24 12:47:31      Final result by Jose Armando Baxter MD (04/29/24 12:47:31)                   Impression:      No acute findings      Electronically signed by: Jose Armando Baxter  Date:    04/29/2024  Time:    12:47               Narrative:    EXAMINATION:  XR CHEST AP PORTABLE    CLINICAL HISTORY:  Near-syncope;    TECHNIQUE:  Single frontal view of the chest was performed.    COMPARISON:  10/27/2022    FINDINGS:  Left chest wall cardiac device and cardiomegaly are similar.  The lungs are clear.  No pneumothorax or large pleural effusion.                                       Medications   heparin 25,000 units in dextrose 5% (100 units/ml) IV bolus from bag HIGH INTENSITY nomogram - [facility] (has no administration in time range)   heparin 25,000 units in dextrose 5% 250 mL (100 units/mL) infusion HIGH INTENSITY nomogram - [facility] (18 Units/kg/hr × 71 kg (Adjusted) Intravenous New Bag 4/29/24 1458)   heparin 25,000 units in dextrose 5% (100 units/ml) IV bolus from bag HIGH INTENSITY nomogram - [facility] (has no administration in time range)   heparin 25,000 units in dextrose 5% (100 units/ml) IV bolus from bag HIGH INTENSITY nomogram - [facility] (has no administration in time range)   ondansetron injection 4 mg (4 mg Intravenous Given 4/29/24 1227)   albuterol-ipratropium 2.5 mg-0.5 mg/3 mL nebulizer solution 3 mL (3 mLs Nebulization Given 4/29/24 1248)   enoxaparin injection 80 mg (80 mg Subcutaneous Given 4/29/24 1337)   cefTRIAXone (Rocephin) 1 g in dextrose 5 % in water  (D5W) 100 mL IVPB (MB+) (0 g Intravenous Stopped 4/29/24 1416)   iopamidoL (ISOVUE-370) injection 100 mL (100 mLs Intravenous Given 4/29/24 1331)     Medical Decision Making  Patient with a history of vertigo nausea slid to the floor, rule out stroke, bleed, patient on blood thinners, cardiac ischemia, pneumonia, bowel infection or electrolyte imbalance.    Amount and/or Complexity of Data Reviewed  Labs: ordered. Decision-making details documented in ED Course.  Radiology: ordered. Decision-making details documented in ED Course.  ECG/medicine tests:  Decision-making details documented in ED Course.  Discussion of management or test interpretation with external provider(s): PATIENT WITH BILATERAL MULTIPLE PULMONARY EMBOLI WITH HYPOXEMIA, HISTORY OF DVT AND HISTORY OF RECENT DIAGNOSED RIGHT BREAST CANCER.  WAS STARTED ON HEPARIN AND TRANSFERRED TO HIGH LEVEL CARE.  PATIENT WAS NEW PULMONOLOGY, ECHOCARDIOGRAM AND CARDIOLOGY.  ULTRASOUND NOT AVAILABLE AT THIS FACILITY TODAY.    Risk  Prescription drug management.               ED Course as of 04/29/24 1522   Mon Apr 29, 2024   1223 CBC auto differential(!) [PW]   1244 Troponin I High Sensitivity: 7.9 [PW]   1245 Magnesium : 1.8 [PW]   1245 NT-proBNP: 149 [PW]   1245 Comprehensive metabolic panel(!) [PW]   1250 X-Ray Chest AP Portable [PW]   1253 Protime-INR(!) [PW]   1253 INR: 1.37 [PW]   1254 D dimer, quantitative(!) [PW]   1254 D-Dimer(!): >20.00 [PW]   1306 Urinalysis(!) [PW]   1307 NITRITE UA(!): Positive [PW]   1311 POCT ARTERIAL BLOOD GAS(!) [PW]   1311 NT-proBNP: 149 [PW]   1312 CT Head Without Contrast [PW]   1313 Urinalysis, Microscopic(!) [PW]   1412 CTA Chest Non-Coronary (PE Studies) [PW]   1517 Spoke with Dr. Jo hospitalist at [facility] has accepted the patient but the patient and family request [place] which is where pulmonologist, Dr. Driscoll is located. [PW]   1520 Dr. FORTE ACCEPTED THE PATIENT TO [place] [facility] [PW]      ED Course User Index  [PW]  Robinson Singletary MD                             Clinical Impression:   Final diagnoses:  [R55] Near syncope  [I26.99] Bilateral pulmonary embolism (Primary)  [J96.21] Acute on chronic respiratory failure with hypoxia  [J44.1] COPD with acute exacerbation  [N39.0, R31.9] Urinary tract infection with hematuria, site unspecified        ED Disposition Condition    Transfer to Another Facility Serious                Robinson Singletary MD  04/29/24 8598

## 2024-04-29 NOTE — ED TRIAGE NOTES
89 yo female coming to er with c/o near sycopal episode at home - pt with skin tear to left wrist ems reports- ems giving ns bolus of 250 cc

## 2024-05-01 LAB — UA COMPLETE W REFLEX CULTURE PNL UR: ABNORMAL

## 2024-05-03 NOTE — ADDENDUM NOTE
Encounter addended by: Sara De La Fuente on: 5/3/2024 8:57 AM   Actions taken: Charge Capture section accepted

## 2024-05-07 ENCOUNTER — HOSPITAL ENCOUNTER (INPATIENT)
Facility: HOSPITAL | Age: 89
LOS: 17 days | Discharge: HOME | DRG: 556 | End: 2024-05-24
Attending: FAMILY MEDICINE | Admitting: FAMILY MEDICINE
Payer: MEDICARE

## 2024-05-07 DIAGNOSIS — M62.81 MUSCULAR WEAKNESS: Primary | ICD-10-CM

## 2024-05-07 DIAGNOSIS — R53.1 GENERALIZED WEAKNESS: ICD-10-CM

## 2024-05-07 LAB
ANION GAP SERPL CALCULATED.3IONS-SCNC: 9 MMOL/L (ref 7–16)
BACTERIA #/AREA URNS HPF: ABNORMAL /HPF
BASOPHILS # BLD AUTO: 0.08 K/UL (ref 0–0.2)
BASOPHILS NFR BLD AUTO: 1 % (ref 0–1)
BILIRUB UR QL STRIP: NEGATIVE
BUN SERPL-MCNC: 21 MG/DL (ref 7–18)
BUN/CREAT SERPL: 33 (ref 6–20)
CALCIUM SERPL-MCNC: 8.8 MG/DL (ref 8.5–10.1)
CAOX CRY #/AREA URNS LPF: ABNORMAL /LPF
CHLORIDE SERPL-SCNC: 101 MMOL/L (ref 98–107)
CLARITY UR: CLEAR
CO2 SERPL-SCNC: 29 MMOL/L (ref 21–32)
COLOR UR: ABNORMAL
CREAT SERPL-MCNC: 0.64 MG/DL (ref 0.55–1.02)
DIFFERENTIAL METHOD BLD: ABNORMAL
EGFR (NO RACE VARIABLE) (RUSH/TITUS): 83 ML/MIN/1.73M2
EOSINOPHIL # BLD AUTO: 0.71 K/UL (ref 0–0.5)
EOSINOPHIL NFR BLD AUTO: 8.9 % (ref 1–4)
ERYTHROCYTE [DISTWIDTH] IN BLOOD BY AUTOMATED COUNT: 14.6 % (ref 11.5–14.5)
GLUCOSE SERPL-MCNC: 113 MG/DL (ref 74–106)
GLUCOSE UR STRIP-MCNC: NEGATIVE MG/DL
HCT VFR BLD AUTO: 28.5 % (ref 38–47)
HGB BLD-MCNC: 9.2 G/DL (ref 12–16)
IMM GRANULOCYTES # BLD AUTO: 0.06 K/UL (ref 0–0.04)
IMM GRANULOCYTES NFR BLD: 0.8 % (ref 0–0.4)
KETONES UR STRIP-SCNC: NEGATIVE MG/DL
LEUKOCYTE ESTERASE UR QL STRIP: NEGATIVE
LYMPHOCYTES # BLD AUTO: 1.43 K/UL (ref 1–4.8)
LYMPHOCYTES NFR BLD AUTO: 17.9 % (ref 27–41)
MCH RBC QN AUTO: 29.6 PG (ref 27–31)
MCHC RBC AUTO-ENTMCNC: 32.3 G/DL (ref 32–36)
MCV RBC AUTO: 91.6 FL (ref 80–96)
MONOCYTES # BLD AUTO: 1.17 K/UL (ref 0–0.8)
MONOCYTES NFR BLD AUTO: 14.7 % (ref 2–6)
MPC BLD CALC-MCNC: 9.6 FL (ref 9.4–12.4)
MUCOUS THREADS #/AREA URNS HPF: ABNORMAL /HPF
NEUTROPHILS # BLD AUTO: 4.52 K/UL (ref 1.8–7.7)
NEUTROPHILS NFR BLD AUTO: 56.7 % (ref 53–65)
NITRITE UR QL STRIP: NEGATIVE
NRBC # BLD AUTO: 0 X10E3/UL
NRBC, AUTO (.00): 0 %
PH UR STRIP: 6.5 PH UNITS
PLATELET # BLD AUTO: 367 K/UL (ref 150–400)
POTASSIUM SERPL-SCNC: 4.5 MMOL/L (ref 3.5–5.1)
PROT UR QL STRIP: NEGATIVE
RBC # BLD AUTO: 3.11 M/UL (ref 4.2–5.4)
RBC # UR STRIP: ABNORMAL /UL
RBC #/AREA URNS HPF: ABNORMAL /HPF
SODIUM SERPL-SCNC: 134 MMOL/L (ref 136–145)
SP GR UR STRIP: 1.02
SQUAMOUS #/AREA URNS LPF: ABNORMAL /LPF
UROBILINOGEN UR STRIP-ACNC: 1 MG/DL
WBC # BLD AUTO: 7.97 K/UL (ref 4.5–11)
WBC #/AREA URNS HPF: ABNORMAL /HPF
YEAST #/AREA URNS HPF: ABNORMAL /HPF

## 2024-05-07 PROCEDURE — 81001 URINALYSIS AUTO W/SCOPE: CPT | Performed by: FAMILY MEDICINE

## 2024-05-07 PROCEDURE — 11000004 HC SNF PRIVATE

## 2024-05-07 PROCEDURE — 25000003 PHARM REV CODE 250: Performed by: EMERGENCY MEDICINE

## 2024-05-07 PROCEDURE — 99900035 HC TECH TIME PER 15 MIN (STAT)

## 2024-05-07 PROCEDURE — 85025 COMPLETE CBC W/AUTO DIFF WBC: CPT | Performed by: FAMILY MEDICINE

## 2024-05-07 PROCEDURE — 27000221 HC OXYGEN, UP TO 24 HOURS

## 2024-05-07 PROCEDURE — 94761 N-INVAS EAR/PLS OXIMETRY MLT: CPT

## 2024-05-07 PROCEDURE — 80048 BASIC METABOLIC PNL TOTAL CA: CPT | Performed by: FAMILY MEDICINE

## 2024-05-07 PROCEDURE — 99900031 HC PATIENT EDUCATION (STAT)

## 2024-05-07 PROCEDURE — 36415 COLL VENOUS BLD VENIPUNCTURE: CPT | Performed by: FAMILY MEDICINE

## 2024-05-07 RX ORDER — TALC
6 POWDER (GRAM) TOPICAL NIGHTLY PRN
Status: DISCONTINUED | OUTPATIENT
Start: 2024-05-07 | End: 2024-05-24 | Stop reason: HOSPADM

## 2024-05-07 RX ORDER — DOCUSATE SODIUM 100 MG/1
100 CAPSULE, LIQUID FILLED ORAL 2 TIMES DAILY
Status: DISCONTINUED | OUTPATIENT
Start: 2024-05-07 | End: 2024-05-24 | Stop reason: HOSPADM

## 2024-05-07 RX ORDER — ALBUTEROL SULFATE 0.83 MG/ML
2.5 SOLUTION RESPIRATORY (INHALATION) EVERY 6 HOURS
Status: DISCONTINUED | OUTPATIENT
Start: 2024-05-08 | End: 2024-05-12

## 2024-05-07 RX ORDER — IPRATROPIUM BROMIDE AND ALBUTEROL SULFATE 2.5; .5 MG/3ML; MG/3ML
3 SOLUTION RESPIRATORY (INHALATION) 4 TIMES DAILY PRN
Status: DISCONTINUED | OUTPATIENT
Start: 2024-05-07 | End: 2024-05-24 | Stop reason: HOSPADM

## 2024-05-07 RX ORDER — LACTULOSE 10 G/15ML
20 SOLUTION ORAL EVERY 8 HOURS PRN
Status: DISCONTINUED | OUTPATIENT
Start: 2024-05-07 | End: 2024-05-24 | Stop reason: HOSPADM

## 2024-05-07 RX ORDER — DOCUSATE SODIUM 100 MG/1
100 CAPSULE, LIQUID FILLED ORAL 2 TIMES DAILY
COMMUNITY

## 2024-05-07 RX ORDER — BUDESONIDE 0.5 MG/2ML
0.5 INHALANT ORAL EVERY 12 HOURS
Status: DISCONTINUED | OUTPATIENT
Start: 2024-05-08 | End: 2024-05-24 | Stop reason: HOSPADM

## 2024-05-07 RX ORDER — POLYETHYLENE GLYCOL 3350 17 G/17G
17 POWDER, FOR SOLUTION ORAL DAILY
COMMUNITY

## 2024-05-07 RX ORDER — LACTULOSE 10 G/15ML
20 SOLUTION ORAL; RECTAL EVERY 8 HOURS PRN
COMMUNITY

## 2024-05-07 RX ORDER — POLYETHYLENE GLYCOL 3350 17 G/17G
17 POWDER, FOR SOLUTION ORAL DAILY
Status: DISCONTINUED | OUTPATIENT
Start: 2024-05-08 | End: 2024-05-24 | Stop reason: HOSPADM

## 2024-05-07 RX ORDER — ACETAMINOPHEN 325 MG/1
650 TABLET ORAL EVERY 6 HOURS PRN
Status: DISCONTINUED | OUTPATIENT
Start: 2024-05-07 | End: 2024-05-24 | Stop reason: HOSPADM

## 2024-05-07 RX ORDER — CALCIUM CARBONATE 200(500)MG
500 TABLET,CHEWABLE ORAL 2 TIMES DAILY PRN
Status: DISCONTINUED | OUTPATIENT
Start: 2024-05-07 | End: 2024-05-24 | Stop reason: HOSPADM

## 2024-05-07 RX ORDER — FLUTICASONE FUROATE AND VILANTEROL 100; 25 UG/1; UG/1
1 POWDER RESPIRATORY (INHALATION) DAILY
COMMUNITY
Start: 2024-02-07

## 2024-05-07 RX ORDER — AMOXICILLIN 250 MG
1 CAPSULE ORAL 2 TIMES DAILY
Status: DISCONTINUED | OUTPATIENT
Start: 2024-05-07 | End: 2024-05-24 | Stop reason: HOSPADM

## 2024-05-07 RX ORDER — PAROXETINE 10 MG/1
20 TABLET, FILM COATED ORAL DAILY
Status: DISCONTINUED | OUTPATIENT
Start: 2024-05-08 | End: 2024-05-24 | Stop reason: HOSPADM

## 2024-05-07 RX ORDER — LOSARTAN POTASSIUM 25 MG/1
50 TABLET ORAL DAILY
Status: DISCONTINUED | OUTPATIENT
Start: 2024-05-08 | End: 2024-05-24 | Stop reason: HOSPADM

## 2024-05-07 RX ORDER — ATORVASTATIN CALCIUM 10 MG/1
10 TABLET, FILM COATED ORAL NIGHTLY
Status: DISCONTINUED | OUTPATIENT
Start: 2024-05-08 | End: 2024-05-24 | Stop reason: HOSPADM

## 2024-05-07 RX ORDER — FLUTICASONE FUROATE AND VILANTEROL 100; 25 UG/1; UG/1
1 POWDER RESPIRATORY (INHALATION) DAILY
Status: DISCONTINUED | OUTPATIENT
Start: 2024-05-08 | End: 2024-05-07

## 2024-05-07 RX ADMIN — APIXABAN 5 MG: 2.5 TABLET, FILM COATED ORAL at 08:05

## 2024-05-07 NOTE — NURSING
Received from [facility]. Assisted in hospital via stretcher. Alert, oriented. Able to voice needs. Oriented to room environment. O2 placed at 2L via nasal cannula. Family present with patient. Assessment performed. Call bell near. Will cont to monitor.

## 2024-05-07 NOTE — PLAN OF CARE
Problem: Adult Inpatient Plan of Care  Goal: Plan of Care Review  Outcome: Not Progressing  Flowsheets (Taken 5/7/2024 1731)  Plan of Care Reviewed With:   patient   family  Goal: Patient-Specific Goal (Individualized)  Outcome: Not Progressing  Goal: Absence of Hospital-Acquired Illness or Injury  Outcome: Not Progressing  Intervention: Identify and Manage Fall Risk  Flowsheets (Taken 5/7/2024 1731)  Safety Promotion/Fall Prevention:   assistive device/personal item within reach   bed alarm set   bedside commode chair   commode/urinal/bedpan at bedside   Fall Risk signage in place   Fall Risk reviewed with patient/family   high risk medications identified   medications reviewed   nonskid shoes/socks when out of bed   room near unit station   side rails raised x 2   instructed to call staff for mobility  Intervention: Prevent Skin Injury  Flowsheets (Taken 5/7/2024 1731)  Device Skin Pressure Protection:   adhesive use limited   absorbent pad utilized/changed   pressure points protected  Intervention: Prevent and Manage VTE (Venous Thromboembolism) Risk  Flowsheets (Taken 5/7/2024 1731)  VTE Prevention/Management:   ambulation promoted   fluids promoted  Intervention: Prevent Infection  Flowsheets (Taken 5/7/2024 1731)  Infection Prevention:   hand hygiene promoted   rest/sleep promoted  Goal: Optimal Comfort and Wellbeing  Outcome: Not Progressing  Intervention: Monitor Pain and Promote Comfort  Flowsheets (Taken 5/7/2024 1731)  Pain Management Interventions:   pillow support provided   care clustered  Intervention: Provide Person-Centered Care  Flowsheets (Taken 5/7/2024 1731)  Trust Relationship/Rapport:   care explained   questions encouraged   choices provided   reassurance provided   emotional support provided   thoughts/feelings acknowledged   empathic listening provided   questions answered  Goal: Readiness for Transition of Care  Outcome: Not Progressing  Intervention: Mutually Develop Transition  Plan  Flowsheets (Taken 5/7/2024 1731)  Equipment Currently Used at Home:   bedside commode   oxygen   walker, rolling  Transportation Anticipated: family or friend will provide  Communicated TREASURE with patient/caregiver: Date not available/Unable to determine  Do you expect to return to your current living situation?: Yes  Do you have help at home or someone to help you manage your care at home?: Yes  Readmission within 30 days?: No  Do you currently have service(s) that help you manage your care at home?: No     Problem: Fall Injury Risk  Goal: Absence of Fall and Fall-Related Injury  Outcome: Not Progressing  Intervention: Identify and Manage Contributors  Flowsheets (Taken 5/7/2024 1731)  Self-Care Promotion:   independence encouraged   BADL personal objects within reach   BADL personal routines maintained   meal set-up provided   adaptive equipment use encouraged  Medication Review/Management:   medications reviewed   high-risk medications identified   pharmacy consulted  Intervention: Promote Injury-Free Environment  Flowsheets (Taken 5/7/2024 1731)  Safety Promotion/Fall Prevention:   assistive device/personal item within reach   bed alarm set   bedside commode chair   commode/urinal/bedpan at bedside   Fall Risk signage in place   Fall Risk reviewed with patient/family   high risk medications identified   medications reviewed   nonskid shoes/socks when out of bed   room near unit station   side rails raised x 2   instructed to call staff for mobility

## 2024-05-07 NOTE — PLAN OF CARE
[facility] - Medical Surgical Unit  Initial Discharge Assessment       Primary Care Provider: Maricel Avila MD    Admission Diagnosis: Generalized weakness [R53.1]    Admission Date: 5/7/2024  Expected Discharge Date:     Transition of Care Barriers: (P) None    Payor: MEDICARE / Plan: MEDICARE PART A & B / Product Type: Government /     Extended Emergency Contact Information  Primary Emergency Contact: Yina Corey Phone: [phone]  Relation: Daughter    Discharge Plan A: (P) Home Health, Home  Discharge Plan B: (P) Home with family, Home Health      [facility] - [address] [address]  Phone: [phone] Fax: [phone]      Initial Assessment (most recent)       Adult Discharge Assessment - 05/07/24 1536          Discharge Assessment    Assessment Type Discharge Planning Assessment (P)      Confirmed/corrected address, phone number and insurance Yes (P)      Confirmed Demographics Correct on Facesheet (P)      Source of Information patient;health record (P)      Reason For Admission generalized weakness (P)      People in Home alone (P)      Facility Arrived From: [facility] (P)      Do you expect to return to your current living situation? Yes (P)      Do you have help at home or someone to help you manage your care at home? Yes (P)      Who are your caregiver(s) and their phone number(s)? Yina Corey (daughter) [phone] (P)      Prior to hospitilization cognitive status: Alert/Oriented (P)      Current cognitive status: Alert/Oriented (P)      Walking or Climbing Stairs Difficulty yes (P)      Walking or Climbing Stairs ambulation difficulty, requires equipment (P)      Mobility Management rolling walker (P)      Dressing/Bathing Difficulty yes (P)      Dressing/Bathing bathing difficulty, assistance 1 person (P)      Home Accessibility wheelchair accessible (P)      Home Layout Able to live on 1st floor  (P)      Equipment Currently Used at Home 3-in-1 commode;oxygen;walker, rolling (P)      Readmission within 30 days? No (P)      Patient currently being followed by outpatient case management? No (P)      Do you currently have service(s) that help you manage your care at home? No (P)      Do you take prescription medications? Yes (P)      Do you have prescription coverage? Yes (P)      Coverage Medicare (P)      Do you have any problems affording any of your prescribed medications? No (P)      Is the patient taking medications as prescribed? yes (P)      Who is going to help you get home at discharge? Yina Corey (daughter) [phone] (P)      How do you get to doctors appointments? family or friend will provide (P)      Are you on dialysis? No (P)      Do you take coumadin? Yes (P)      Discharge Plan A Home Health;Home (P)      Discharge Plan B Home with family;Home Health (P)      DME Needed Upon Discharge  none (P)      Discharge Plan discussed with: Patient (P)      Transition of Care Barriers None (P)         Physical Activity    On average, how many days per week do you engage in moderate to strenuous exercise (like a brisk walk)? 0 days (P)      On average, how many minutes do you engage in exercise at this level? 0 min (P)         Financial Resource Strain    How hard is it for you to pay for the very basics like food, housing, medical care, and heating? Not hard at all (P)         Housing Stability    In the last 12 months, was there a time when you were not able to pay the mortgage or rent on time? No (P)      At any time in the past 12 months, were you homeless or living in a shelter (including now)? No (P)         Food Insecurity    Within the past 12 months, you worried that your food would run out before you got the money to buy more. Never true (P)      Within the past 12 months, the food you bought just didn't last and you didn't have money to get more. Never true (P)         Stress    Do you  feel stress - tense, restless, nervous, or anxious, or unable to sleep at night because your mind is troubled all the time - these days? Only a little (P)         Social Isolation    How often do you feel lonely or isolated from those around you?  Sometimes (P)         Alcohol Use    Q1: How often do you have a drink containing alcohol? Never (P)      Q2: How many drinks containing alcohol do you have on a typical day when you are drinking? Patient does not drink (P)      Q3: How often do you have six or more drinks on one occasion? Never (P)         Utilities    In the past 12 months has the electric, gas, oil, or water company threatened to shut off services in your home? No (P)         Health Literacy    How often do you need to have someone help you when you read instructions, pamphlets, or other written material from your doctor or pharmacy? Never (P)                    Pt lives alone but daughter and 2 sons live next door. Pt does not currently have home but has all of the needed DMEs. CM will follow for any discharge needs.

## 2024-05-08 PROBLEM — M62.81 MUSCULAR WEAKNESS: Status: ACTIVE | Noted: 2024-05-08

## 2024-05-08 PROCEDURE — 97165 OT EVAL LOW COMPLEX 30 MIN: CPT

## 2024-05-08 PROCEDURE — 25000242 PHARM REV CODE 250 ALT 637 W/ HCPCS: Performed by: FAMILY MEDICINE

## 2024-05-08 PROCEDURE — 94761 N-INVAS EAR/PLS OXIMETRY MLT: CPT

## 2024-05-08 PROCEDURE — 99305 1ST NF CARE MODERATE MDM 35: CPT | Mod: AI,,, | Performed by: FAMILY MEDICINE

## 2024-05-08 PROCEDURE — 63600175 PHARM REV CODE 636 W HCPCS: Performed by: FAMILY MEDICINE

## 2024-05-08 PROCEDURE — 94640 AIRWAY INHALATION TREATMENT: CPT

## 2024-05-08 PROCEDURE — 27000221 HC OXYGEN, UP TO 24 HOURS

## 2024-05-08 PROCEDURE — 25000003 PHARM REV CODE 250: Performed by: FAMILY MEDICINE

## 2024-05-08 PROCEDURE — 97161 PT EVAL LOW COMPLEX 20 MIN: CPT

## 2024-05-08 PROCEDURE — 25000003 PHARM REV CODE 250: Performed by: EMERGENCY MEDICINE

## 2024-05-08 PROCEDURE — 11000004 HC SNF PRIVATE

## 2024-05-08 RX ADMIN — ATORVASTATIN CALCIUM 10 MG: 10 TABLET, FILM COATED ORAL at 08:05

## 2024-05-08 RX ADMIN — DOCUSATE SODIUM 100 MG: 100 CAPSULE, LIQUID FILLED ORAL at 08:05

## 2024-05-08 RX ADMIN — BUDESONIDE INHALATION 0.5 MG: 0.5 SUSPENSION RESPIRATORY (INHALATION) at 07:05

## 2024-05-08 RX ADMIN — ALBUTEROL SULFATE 2.5 MG: 2.5 SOLUTION RESPIRATORY (INHALATION) at 06:05

## 2024-05-08 RX ADMIN — MEROPENEM 500 MG: 500 INJECTION, POWDER, FOR SOLUTION INTRAVENOUS at 05:05

## 2024-05-08 RX ADMIN — ALBUTEROL SULFATE 2.5 MG: 2.5 SOLUTION RESPIRATORY (INHALATION) at 07:05

## 2024-05-08 RX ADMIN — MEROPENEM 500 MG: 500 INJECTION, POWDER, FOR SOLUTION INTRAVENOUS at 08:05

## 2024-05-08 RX ADMIN — LOSARTAN POTASSIUM 50 MG: 25 TABLET, FILM COATED ORAL at 08:05

## 2024-05-08 RX ADMIN — PAROXETINE HYDROCHLORIDE 20 MG: 10 TABLET, FILM COATED ORAL at 08:05

## 2024-05-08 RX ADMIN — ALBUTEROL SULFATE 2.5 MG: 2.5 SOLUTION RESPIRATORY (INHALATION) at 12:05

## 2024-05-08 RX ADMIN — SENNOSIDES AND DOCUSATE SODIUM 1 TABLET: 8.6; 5 TABLET ORAL at 08:05

## 2024-05-08 RX ADMIN — APIXABAN 5 MG: 2.5 TABLET, FILM COATED ORAL at 08:05

## 2024-05-08 NOTE — PLAN OF CARE
Description: Grooming Status:   Short Term Goal: Pt will perform grooming with s/u sitting EOB.   Long Term Goal: Pt will perform grooming/oral hygiene standing at sink with Mod I      LE dressing Status:   Short Term Goal: Pt will perform LE dressing with min a.   Long Term Goal: Pt will perform LE dressing with s/u.    Toileting Status:   Short Term Goal: Pt will perform toilet hygiene on BSC with min a.  Long Term Goal: Pt will perform toilet hygiene on toilet with no AE with s/u.    Commode Transfer:   Short Term Goal: Pt will perform BSC t/f with s/u.  Long Term Goal:  Pt will perform toilet t/f in bathroom with mod I.     Bathing Status:   Long Term Goal: Pt will perform sponge bath with s/u with no unsafe fatigue.     Strength Status:   Long Term Goal: Pt to perform BUE strengthening with weights and/or body weight to increase ADL independence and safety    Endurance Status:   Short Term Goal:pt to perform 15 min OT treatment with 5 or greater rest breaks  Long Term Goal: pt to perform 30 min OT treat with 3 or less rest breaks

## 2024-05-08 NOTE — PLAN OF CARE
Problem: Adult Inpatient Plan of Care  Goal: Plan of Care Review  5/7/2024 2053 by Barbara Cruz RN  Flowsheets (Taken 5/7/2024 2053)  Plan of Care Reviewed With: patient  5/7/2024 2053 by Barbara Cruz RN  Outcome: Progressing  Goal: Patient-Specific Goal (Individualized)  Outcome: Progressing  Goal: Absence of Hospital-Acquired Illness or Injury  Outcome: Progressing  Intervention: Prevent Infection  Flowsheets (Taken 5/7/2024 2053)  Infection Prevention:   environmental surveillance performed   equipment surfaces disinfected   hand hygiene promoted   personal protective equipment utilized   rest/sleep promoted   single patient room provided  Goal: Optimal Comfort and Wellbeing  Outcome: Progressing  Intervention: Provide Person-Centered Care  Flowsheets (Taken 5/7/2024 2053)  Trust Relationship/Rapport:   care explained   emotional support provided   reassurance provided   thoughts/feelings acknowledged   questions encouraged   empathic listening provided   choices provided   questions answered  Goal: Readiness for Transition of Care  Outcome: Progressing  Intervention: Mutually Develop Transition Plan  Flowsheets (Taken 5/7/2024 2053)  Equipment Currently Used at Home:   bedside commode   oxygen   walker, rolling  Transportation Anticipated: family or friend will provide  Communicated TREASURE with patient/caregiver: Date not available/Unable to determine  Do you expect to return to your current living situation?: Yes  Do you have help at home or someone to help you manage your care at home?: Yes  Readmission within 30 days?: No  Do you currently have service(s) that help you manage your care at home?: No

## 2024-05-08 NOTE — SUBJECTIVE & OBJECTIVE
Past Medical History:   Diagnosis Date    Afib     Asthma     Cancer     right breast    COPD (chronic obstructive pulmonary disease)     2L NC @ home    hx DVT in BLE     Hypercholesterolemia     Hypertension     Mass of lower outer quadrant of right breast 03/12/2024    Pacemaker 03/01/2022    Dr. Ramirez @ [facility]    Unspecified chronic bronchitis        Past Surgical History:   Procedure Laterality Date    APPENDECTOMY      BREAST BIOPSY Right 3/12/2024    Procedure: BIOPSY, BREAST;  Surgeon: Ashleigh Treadwell MD;  Location: [facility];  Service: General;  Laterality: Right;    cataract surgery       CHOLECYSTECTOMY      HYSTERECTOMY      INSERTION OF PACEMAKER Left 03/01/2022    Dr. Ashley Acuna    JOINT REPLACEMENT      left knee    SHOULDER SURGERY Bilateral        Review of patient's allergies indicates:  No Known Allergies    No current facility-administered medications on file prior to encounter.     Current Outpatient Medications on File Prior to Encounter   Medication Sig    albuterol-ipratropium (DUO-NEB) 2.5 mg-0.5 mg/3 mL nebulizer solution Take 3 mLs by nebulization 4 (four) times daily as needed.    atorvastatin (LIPITOR) 10 MG tablet Take 10 mg by mouth every evening.    docusate sodium (COLACE) 100 MG capsule Take 100 mg by mouth 2 (two) times daily.    fluticasone furoate-vilanteroL (BREO) 100-25 mcg/dose diskus inhaler Inhale 1 puff into the lungs once daily.    losartan (COZAAR) 50 MG tablet Take 50 mg by mouth once daily.    paroxetine (PAXIL) 20 MG tablet Take 20 mg by mouth once daily.    apixaban (ELIQUIS) 5 mg Tab Take 5 mg by mouth 2 (two) times daily.    azithromycin (Z-JOHANA) 250 MG tablet Take 1 tablet (250 mg total) by mouth once daily. Take first 2 tablets together, then 1 every day until finished. (Patient not taking: Reported on 2/28/2024)    hydroCHLOROthiazide (MICROZIDE) 12.5 mg capsule 12.5 mg once daily.    HYDROcodone-acetaminophen (NORCO) 5-325 mg per tablet  Take 1 tablet by mouth every 6 (six) hours as needed for Pain.    lactulose (CHRONULAC) 10 gram/15 mL solution Take 20 g by mouth every 8 (eight) hours as needed (constipation).    meclizine (ANTIVERT) 25 mg tablet 25 mg 3 (three) times daily as needed.    oxyCODONE-acetaminophen (PERCOCET)  mg per tablet Take 1 tablet by mouth every 12 (twelve) hours as needed for Pain.    polyethylene glycol (GLYCOLAX) 17 gram PwPk Take 17 g by mouth once daily.    tamoxifen (NOLVADEX) 20 MG Tab Take 20 mg by mouth once daily. (Patient not taking: Reported on 5/7/2024.)    XIIDRA 5 % Dpet Apply 1 drop to eye 2 (two) times a day.     Family History       Problem Relation (Age of Onset)    Stroke Grandchild          Tobacco Use    Smoking status: Never    Smokeless tobacco: Never   Substance and Sexual Activity    Alcohol use: Not Currently    Drug use: Not Currently    Sexual activity: Never     Review of Systems   Constitutional:  Negative for activity change.   HENT:  Negative for congestion.    Eyes:  Negative for visual disturbance.   Respiratory:  Negative for cough.    Cardiovascular:  Negative for chest pain.   Gastrointestinal:  Negative for abdominal distention.   Genitourinary:  Negative for difficulty urinating.   Musculoskeletal:  Negative for arthralgias.   Skin:  Negative for color change.   Neurological:  Positive for weakness. Negative for dizziness.   Psychiatric/Behavioral:  Negative for behavioral problems.      Objective:     Vital Signs (Most Recent):  Temp: 98.4 °F (36.9 °C) (05/08/24 0727)  Pulse: 102 (05/08/24 0727)  Resp: 18 (05/08/24 0727)  BP: 129/77 (05/08/24 0727)  SpO2: 97 % (05/08/24 0727) Vital Signs (24h Range):  Temp:  [97.9 °F (36.6 °C)-98.5 °F (36.9 °C)] 98.4 °F (36.9 °C)  Pulse:  [] 102  Resp:  [17-20] 18  SpO2:  [95 %-98 %] 97 %  BP: (102-129)/(64-77) 129/77     Weight: 85.8 kg (189 lb 2.5 oz)  Body mass index is 28.76 kg/m².     Physical Exam  Vitals and nursing note reviewed.    Constitutional:       Appearance: Normal appearance. She is normal weight.   HENT:      Head: Normocephalic and atraumatic.      Right Ear: Tympanic membrane normal.      Left Ear: Tympanic membrane normal.      Nose: Nose normal.      Mouth/Throat:      Mouth: Mucous membranes are moist.   Eyes:      Extraocular Movements: Extraocular movements intact.      Conjunctiva/sclera: Conjunctivae normal.      Pupils: Pupils are equal, round, and reactive to light.   Cardiovascular:      Rate and Rhythm: Normal rate. Rhythm irregular.      Pulses: Normal pulses.   Pulmonary:      Effort: Pulmonary effort is normal.      Breath sounds: Normal breath sounds.   Abdominal:      General: Abdomen is flat. Bowel sounds are normal.      Palpations: Abdomen is soft.   Musculoskeletal:         General: Normal range of motion.      Cervical back: Normal range of motion and neck supple.   Skin:     General: Skin is warm and dry.   Neurological:      General: No focal deficit present.      Mental Status: She is alert and oriented to person, place, and time.   Psychiatric:         Mood and Affect: Mood normal.              CRANIAL NERVES     CN III, IV, VI   Pupils are equal, round, and reactive to light.       Significant Labs: All pertinent labs within the past 24 hours have been reviewed.    Significant Imaging: I have reviewed all pertinent imaging results/findings within the past 24 hours.

## 2024-05-08 NOTE — PT/OT/SLP EVAL
Occupational Therapy   Evaluation    Name: Verito Weston  MRN: [id number]  Admitting Diagnosis: Muscular weakness  Recent Surgery: * No surgery found *      Recommendations:     Discharge Recommendations:    Discharge Equipment Recommendations:     Barriers to discharge:       Assessment:     Verito Weston is a 89 y.o. female with a medical diagnosis of Muscular weakness.   Performance deficits affecting function: weakness, impaired endurance, impaired self care skills, impaired functional mobility, impaired balance, decreased lower extremity function, decreased safety awareness.      Rehab Prognosis: Good; patient would benefit from acute skilled OT services to address these deficits and reach maximum level of function.       Plan:     Patient to be seen 5 x/week to address the above listed problems via self-care/home management, therapeutic activities, therapeutic exercises  Plan of Care Expires:    Plan of Care Reviewed with: patient    Subjective     Chief Complaint: Get stronger  Patient/Family Comments/goals: Go home    Occupational Profile:  Living Environment: lives alone  Previous level of function: Mod I  Roles and Routines: self-care, home management  Equipment Used at Home: bedside commode, oxygen, walker, rolling  Assistance upon Discharge: family    Pain/Comfort:       Patients cultural, spiritual, Quaker conflicts given the current situation: no    Objective:     Communicated with: RN prior to session.  Patient found supine with   upon OT entry to room.    General Precautions: Standard, fall  Orthopedic Precautions:    Braces:    Respiratory Status: Nasal cannula, flow 2 L/min    Occupational Performance:    Bed Mobility:    Patient completed Rolling/Turning to Left with  minimum assistance    Functional Mobility/Transfers:  Patient completed Sit <> Stand Transfer with minimum assistance  with  rolling walker   Functional Mobility: N/A    Activities of Daily Living:  Feeding:  supervision  breakfast    Cognitive/Visual Perceptual:  Cognitive/Psychosocial Skills:     -       Oriented to: Person, Place, Time, and Situation     Physical Exam:  Upper Extremity Range of Motion:     -       Right Upper Extremity: WFL  -       Left Upper Extremity: WFL    AMPAC 6 Click ADL:  AMPAC Total Score: 19    Treatment & Education:  Pt educated on OT role/POC.   Importance of OOB activity with staff assistance.  Importance of sitting up in the chair throughout the day as tolerated, especially for meals   Safety during functional t/f and mobility  Importance of assisting with self-care activities       Patient left supine with call button in reach    GOALS:   Multidisciplinary Problems       Occupational Therapy Goals          Problem: Occupational Therapy    Goal Priority Disciplines Outcome Interventions   Occupational Therapy Goal     OT, PT/OT     Description: Description: Grooming Status:   Short Term Goal: Pt will perform grooming with s/u sitting EOB.   Long Term Goal: Pt will perform grooming/oral hygiene standing at sink with Mod I      LE dressing Status:   Short Term Goal: Pt will perform LE dressing with min a.   Long Term Goal: Pt will perform LE dressing with s/u.    Toileting Status:   Short Term Goal: Pt will perform toilet hygiene on BSC with min a.  Long Term Goal: Pt will perform toilet hygiene on toilet with no AE with s/u.    Commode Transfer:   Short Term Goal: Pt will perform BSC t/f with s/u.  Long Term Goal:  Pt will perform toilet t/f in bathroom with mod I.     Bathing Status:   Long Term Goal: Pt will perform sponge bath with s/u with no unsafe fatigue.     Strength Status:   Long Term Goal: Pt to perform BUE strengthening with weights and/or body weight to increase ADL independence and safety    Endurance Status:   Short Term Goal:pt to perform 15 min OT treatment with 5 or greater rest breaks  Long Term Goal: pt to perform 30 min OT treat with 3 or less rest breaks                        History:     Past Medical History:   Diagnosis Date    Afib     Asthma     Cancer     right breast    COPD (chronic obstructive pulmonary disease)     2L NC @ home    hx DVT in BLE     Hypercholesterolemia     Hypertension     Mass of lower outer quadrant of right breast 03/12/2024    Pacemaker 03/01/2022    Dr. Ramirez @ [facility]    Unspecified chronic bronchitis          Past Surgical History:   Procedure Laterality Date    APPENDECTOMY      BREAST BIOPSY Right 3/12/2024    Procedure: BIOPSY, BREAST;  Surgeon: Ashleigh Treadwell MD;  Location: [facility];  Service: General;  Laterality: Right;    cataract surgery       CHOLECYSTECTOMY      HYSTERECTOMY      INSERTION OF PACEMAKER Left 03/01/2022    Dr. Ramirez @ [facility]    JOINT REPLACEMENT      left knee    SHOULDER SURGERY Bilateral        Time Tracking:     OT Date of Treatment: 05/08/24  OT Start Time: 0805  OT Stop Time: 0815  OT Total Time (min): 10 min    Billable Minutes:Evaluation 10 min  Leann Rowley OTR/L      5/8/2024

## 2024-05-08 NOTE — ADDENDUM NOTE
Encounter addended by: Myra Murillo on: 5/8/2024 10:21 AM   Actions taken: SmartForm saved, Flowsheet accepted, Charge Capture section accepted

## 2024-05-08 NOTE — PT/OT/SLP EVAL
Physical Therapy Evaluation    Patient Name:  Verito Weston   MRN:  [id number]    Recommendations:     Discharge Recommendations: Low Intensity Therapy   Discharge Equipment Recommendations: none   Barriers to discharge: Decreased caregiver support    Assessment:     Verito Weston is a 89 y.o. female admitted with a medical diagnosis of Muscular weakness.  She presents with the following impairments/functional limitations: weakness, impaired endurance, impaired functional mobility, gait instability, impaired self care skills, impaired balance, decreased lower extremity function, decreased safety awareness. Patient's impairments have resulted in decrease safety and idependence with functional mobility and ADLs resulting in decreased ability to return home at this time.      Rehab Prognosis: Good; patient would benefit from acute skilled PT services to address these deficits and reach maximum level of function.    Recent Surgery: * No surgery found *      Plan:     During this hospitalization, patient to be seen 5 x/week to address the identified rehab impairments via gait training, therapeutic activities, therapeutic exercises and progress toward the following goals:    Plan of Care Expires:  05/28/24    Subjective     Chief Complaint: weakness and decreased mobility   Patient/Family Comments/goals: improve safety and independence with mobility for safe return home.     Pain/Comfort:  Pain Rating 1: 0/10    Patients cultural, spiritual, Nondenominational conflicts given the current situation: no    Living Environment:  Patient lives alone in a home with a ramp to enter.   Prior to admission, patients level of function was modified independent.  Equipment used at home: bedside commode, oxygen, walker, rolling.  DME owned (not currently used): none.  Upon discharge, patient will have assistance from family.    Objective:     Communicated with nursing staff prior to session.  Patient found up in chair with peripheral IV   upon PT entry to room.    General Precautions: Standard, fall  Orthopedic Precautions:N/A   Braces: N/A  Respiratory Status: Nasal cannula, flow 2 L/min    Exams:  RLE Strength: Deficits: 3+/5  LLE Strength: Deficits: 3+/5    Functional Mobility:  Transfers:     Sit to Stand:  moderate assistance with rolling walker  Gait: 15 feet with rolling walker and min A   Balance: Fair       AM-PAC 6 CLICK MOBILITY  Total Score:16       Patient left up in chair with call button in reach and chair alarm on.    GOALS:   Multidisciplinary Problems       Physical Therapy Goals       Not on file                    History:     Past Medical History:   Diagnosis Date    Afib     Asthma     Cancer     right breast    COPD (chronic obstructive pulmonary disease)     2L NC @ home    hx DVT in BLE     Hypercholesterolemia     Hypertension     Mass of lower outer quadrant of right breast 03/12/2024    Pacemaker 03/01/2022    Dr. Ramirez @ [facility]    Unspecified chronic bronchitis        Past Surgical History:   Procedure Laterality Date    APPENDECTOMY      BREAST BIOPSY Right 3/12/2024    Procedure: BIOPSY, BREAST;  Surgeon: Ashleigh Treadwell MD;  Location: [facility];  Service: General;  Laterality: Right;    cataract surgery       CHOLECYSTECTOMY      HYSTERECTOMY      INSERTION OF PACEMAKER Left 03/01/2022    Dr. Ashley Acuna    JOINT REPLACEMENT      left knee    SHOULDER SURGERY Bilateral        Time Tracking:     PT Received On: 05/08/24  PT Start Time: 1310     PT Stop Time: 1320  PT Total Time (min): 10 min     Billable Minutes: Evaluation 10 minutes  John Gomes, PT, DPT         05/08/2024

## 2024-05-08 NOTE — HPI
This 89 yr. Old WF has been admitted to [facility] for Swing Bed Services. 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 She was transfused with 4 units prior to this admission. She has recently been diagnosed with breast cancer again. She has SSS with a pacemaker. She has chronic AF. She is also hypertensive. She is here for therapy. She does have a UTI and is presently on merrem.

## 2024-05-08 NOTE — CONSULTS
[facility]  Adult Nutrition  Consult Note         Reason for Assessment  Reason For Assessment: consult SWB admit  Nutrition Risk Screen: no indicators present    Assessment and Plan  Consult received and appreciated. Patient admitted 5/7 with a dx of muscular weakness, UTI, and COPD. Patient is ordered a cardiac on admission. Recommend to change to regular with MD agreement. PO intake good since admit with 100% documented. No issues noted with diet tolerance at this time.     Patient is 85.8 kg with a BMI of 28.76 which is WNL per geriatric standards. Recommend liberalize diet to regular. Encourage po intakes. RD Following.           Learning Needs/Social Determinants of Health    Learning Assessment       05/07/2024 1730 [facility] (5/7/2024 - Present)   Created by Reyna Danielle RN - RN (Nurse) Status: Complete                 PRIMARY LEARNER     Primary Learner Name:  Jovanna Weston TS - 05/07/2024 1730    Relationship:  Patient TS - 05/07/2024 1730    Does the primary learner have any barriers to learning?:  Visual TS - 05/07/2024 1730    What is the preferred language of the primary learner?:  English TS - 05/07/2024 1730    Is an  required?:  No TS - 05/07/2024 1730    How does the primary learner prefer to learn new concepts?:  Listening, Demonstration TS - 05/07/2024 1730    How often do you need to have someone help you read instructions, pamphlets, or written material from your doctor or pharmacy?:  Never TS - 05/07/2024 1730        CO-LEARNER #1     No question answered        CO-LEARNER #2     No question answered        SPECIAL TOPICS     No question answered        ANSWERED BY:     -:  Patient TS - 05/07/2024 1730        Comments         Edit History       Reyna Danielle, RN - RN (Nurse)   05/07/2024 1730                           Social Determinants of Health     Tobacco Use: Low Risk  (5/8/2024)    Patient History      Smoking Tobacco Use: Never     Smokeless Tobacco Use: Never     Passive Exposure: Not on file   Alcohol Use: Not At Risk (5/7/2024)    AUDIT-C     Frequency of Alcohol Consumption: Never     Average Number of Drinks: Patient does not drink     Frequency of Binge Drinking: Never   Financial Resource Strain: Low Risk  (5/7/2024)    Overall Financial Resource Strain (CARDIA)     Difficulty of Paying Living Expenses: Not hard at all   Food Insecurity: No Food Insecurity (5/7/2024)    Hunger Vital Sign     Worried About Running Out of Food in the Last Year: Never true     Ran Out of Food in the Last Year: Never true   Transportation Needs: Unknown (11/1/2022)    PRAPARE - Transportation     Lack of Transportation (Medical): No     Lack of Transportation (Non-Medical): Not on file   Physical Activity: Inactive (5/7/2024)    Exercise Vital Sign     Days of Exercise per Week: 0 days     Minutes of Exercise per Session: 0 min   Stress: No Stress Concern Present (5/7/2024)    [employer] [place] [employer] - Occupational Stress Questionnaire     Feeling of Stress : Only a little   Housing Stability: Low Risk  (5/7/2024)    Housing Stability Vital Sign     Unable to Pay for Housing in the Last Year: No     Homeless in the Last Year: No   Depression: Not on file   Utilities: Not At Risk (5/7/2024)    [facility] Utilities     Threatened with loss of utilities: No   Health Literacy: Adequate Health Literacy (5/7/2024)     Health Literacy     Frequency of need for help with medical instructions: Never   Social Isolation: Not on file (5/7/2024)          Malnutrition  Is Patient Malnourished: No    Nutrition Diagnosis  No Nutritional Diagnosis at this time   Recent Labs   Lab 05/07/24  1455   *     Comments on Glucose: elevated    Nutrition Prescription / Recommendations  Recommendation/Intervention: Recommend to liberalize diet to regular. encourage po intakes  Goals: po intake % with weight maintenance during  "admission  Nutrition Goal Status: new  Current Diet Order: Regular  Nutrition Order Comments: diet changed from cardiac  Chewing or Swallowing Difficulty?: No Chewing or swallowing difficulty  Recommended Diet: Regular  Recommended Oral Supplement: No Oral Supplements  Is Nutrition Support Recommended: [facility] Nutrition Support: No  Is Nutrition Education Recommended: No    Monitor and Evaluation  % current Intake: P.O. intake of 75 - 100 %  % intake to meet estimated needs: 75 - 100 %  Food and Nutrient Intake: energy intake, food and beverage intake  Food and Nutrient Adminstration: diet order  Anthropometric Measurements: height/length, weight, weight change, body mass index  Biochemical Data, Medical Tests and Procedures: electrolyte and renal panel, gastrointestinal profile, glucose/endocrine profile, inflammatory profile, lipid profile  Energy Calories Required: meeting needs  Protein Required: meeting needs  Fluid Required: meeting needs  Tolerance: tolerating    Current Medical Diagnosis and Past Medical History     Past Medical History:   Diagnosis Date    Afib     Asthma     Cancer     right breast    COPD (chronic obstructive pulmonary disease)     2L NC @ home    hx DVT in BLE     Hypercholesterolemia     Hypertension     Mass of lower outer quadrant of right breast 03/12/2024    Pacemaker 03/01/2022    Dr. Ramirez @ [facility]    Unspecified chronic bronchitis        Nutrition/Diet History  Spiritual, Cultural Beliefs, Hoahaoism Practices, Values that Affect Care: no  Food Allergies: NKFA  Factors Affecting Nutritional Intake: None identified at this time    Lab/Procedures/Meds  Recent Labs   Lab 05/07/24  1455   *   K 4.5   BUN 21*   CREATININE 0.64   CALCIUM 8.8      Na low  Bun elevated encourage fluids dx UTI  Last A1c: No results found for: "HGBA1C"  Lab Results   Component Value Date    RBC 3.11 (L) 05/07/2024    HGB 9.2 (L) 05/07/2024    HCT 28.5 (L) 05/07/2024    MCV 91.6 " "05/07/2024    [facility] 29.6 05/07/2024    [facility] 32.3 05/07/2024     Pertinent Labs Reviewed: reviewed  Pertinent Labs Comments: Na 134 BUN 21  Pertinent Medications Reviewed: reviewed  Scheduled Meds:   albuterol sulfate  2.5 mg Nebulization Q6H    apixaban  5 mg Oral BID    atorvastatin  10 mg Oral QHS    budesonide  0.5 mg Nebulization Q12H    docusate sodium  100 mg Oral BID    losartan  50 mg Oral Daily    meropenem IV (PEDS and ADULTS)  500 mg Intravenous Q8H    paroxetine  20 mg Oral Daily    polyethylene glycol  17 g Oral Daily    senna-docusate 8.6-50 mg  1 tablet Oral BID     Continuous Infusions:  PRN Meds:.  Current Facility-Administered Medications:     acetaminophen, 650 mg, Oral, Q6H PRN    albuterol-ipratropium, 3 mL, Nebulization, QID PRN    calcium carbonate, 500 mg, Oral, BID PRN    lactulose, 20 g, Oral, Q8H PRN    melatonin, 6 mg, Oral, Nightly PRN    Anthropometrics  Temp: 98.4 °F (36.9 °C)  Height Method: Stated  Height: 5' 8" (172.7 cm)  Height (inches): 68 in  Weight Method: Bed Scale  Weight: 85.8 kg (189 lb 2.5 oz)  Weight (lb): 189.16 lb  Ideal Body Weight (IBW), Female: 140 lb  % Ideal Body Weight, Female (lb): 135.11 %  BMI (Calculated): 28.8       Estimated/Assessed Needs      Temp: 98.4 °F (36.9 °C)Oral  Weight Used For Calorie Calculations: 85.8 kg (189 lb 2.5 oz)   Energy Need Method: Kcal/kg Energy Calorie Requirements (kcal): 1250-4962  Weight Used For Protein Calculations: 85.8 kg (189 lb 2.5 oz)  Protein Requirements: 69-86  Estimated Fluid Requirement Method: RDA Method    RDA Method (mL): 2145       Nutrition by Nursing  Diet/Nutrition Received: 2 gram sodium  Intake (%): 100%        Last Bowel Movement: 05/07/24                Nutrition Follow-Up  RD Follow-up?: Yes      Nutrition Discharge Planning: home with family/HH; recommend liberalized diet as tolerated        Available via Secure Chat  "

## 2024-05-08 NOTE — H&P
[facility] Surgical [facility]  [facility] Medicine  History & Physical    Patient Name: Verito Weston  MRN: [id number]  Patient Class: IP- Swing  Admission Date: 5/7/2024  Attending Physician: Catrachita Hyman,*   Primary Care Provider: Maricel Avila MD         Patient information was obtained from ER records.     Subjective:     Principal Problem:Muscular weakness    Chief Complaint: No chief complaint on file.       HPI: This 89 yr. Old WF has been admitted to [facility] for Swing Bed Services. 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 She was transfused with 4 units prior to this admission. She has recently been diagnosed with breast cancer again. She has SSS with a pacemaker. She has chronic AF. She is also hypertensive. She is here for therapy. She does have a UTI and is presently on merrem.    Past Medical History:   Diagnosis Date    Afib     Asthma     Cancer     right breast    COPD (chronic obstructive pulmonary disease)     2L NC @ home    hx DVT in BLE     Hypercholesterolemia     Hypertension     Mass of lower outer quadrant of right breast 03/12/2024    Pacemaker 03/01/2022    Dr. Ramirez @ [facility]    Unspecified chronic bronchitis        Past Surgical History:   Procedure Laterality Date    APPENDECTOMY      BREAST BIOPSY Right 3/12/2024    Procedure: BIOPSY, BREAST;  Surgeon: Ashleigh Treadwell MD;  Location: [facility];  Service: General;  Laterality: Right;    cataract surgery       CHOLECYSTECTOMY      HYSTERECTOMY      INSERTION OF PACEMAKER Left 03/01/2022    Dr. Ashley Acuna    JOINT REPLACEMENT      left knee    SHOULDER SURGERY Bilateral        Review of patient's allergies indicates:  No Known Allergies    No current facility-administered medications on file prior to encounter.     Current Outpatient Medications on File Prior to Encounter   Medication Sig    albuterol-ipratropium (DUO-NEB) 2.5 mg-0.5 mg/3  mL nebulizer solution Take 3 mLs by nebulization 4 (four) times daily as needed.    atorvastatin (LIPITOR) 10 MG tablet Take 10 mg by mouth every evening.    docusate sodium (COLACE) 100 MG capsule Take 100 mg by mouth 2 (two) times daily.    fluticasone furoate-vilanteroL (BREO) 100-25 mcg/dose diskus inhaler Inhale 1 puff into the lungs once daily.    losartan (COZAAR) 50 MG tablet Take 50 mg by mouth once daily.    paroxetine (PAXIL) 20 MG tablet Take 20 mg by mouth once daily.    apixaban (ELIQUIS) 5 mg Tab Take 5 mg by mouth 2 (two) times daily.    azithromycin (Z-JOHANA) 250 MG tablet Take 1 tablet (250 mg total) by mouth once daily. Take first 2 tablets together, then 1 every day until finished. (Patient not taking: Reported on 2/28/2024)    hydroCHLOROthiazide (MICROZIDE) 12.5 mg capsule 12.5 mg once daily.    HYDROcodone-acetaminophen (NORCO) 5-325 mg per tablet Take 1 tablet by mouth every 6 (six) hours as needed for Pain.    lactulose (CHRONULAC) 10 gram/15 mL solution Take 20 g by mouth every 8 (eight) hours as needed (constipation).    meclizine (ANTIVERT) 25 mg tablet 25 mg 3 (three) times daily as needed.    oxyCODONE-acetaminophen (PERCOCET)  mg per tablet Take 1 tablet by mouth every 12 (twelve) hours as needed for Pain.    polyethylene glycol (GLYCOLAX) 17 gram PwPk Take 17 g by mouth once daily.    tamoxifen (NOLVADEX) 20 MG Tab Take 20 mg by mouth once daily. (Patient not taking: Reported on 5/7/2024.)    XIIDRA 5 % Dpet Apply 1 drop to eye 2 (two) times a day.     Family History       Problem Relation (Age of Onset)    Stroke Grandchild          Tobacco Use    Smoking status: Never    Smokeless tobacco: Never   Substance and Sexual Activity    Alcohol use: Not Currently    Drug use: Not Currently    Sexual activity: Never     Review of Systems   Constitutional:  Negative for activity change.   HENT:  Negative for congestion.    Eyes:  Negative for visual disturbance.   Respiratory:  Negative  for cough.    Cardiovascular:  Negative for chest pain.   Gastrointestinal:  Negative for abdominal distention.   Genitourinary:  Negative for difficulty urinating.   Musculoskeletal:  Negative for arthralgias.   Skin:  Negative for color change.   Neurological:  Positive for weakness. Negative for dizziness.   Psychiatric/Behavioral:  Negative for behavioral problems.      Objective:     Vital Signs (Most Recent):  Temp: 98.4 °F (36.9 °C) (05/08/24 0727)  Pulse: 102 (05/08/24 0727)  Resp: 18 (05/08/24 0727)  BP: 129/77 (05/08/24 0727)  SpO2: 97 % (05/08/24 0727) Vital Signs (24h Range):  Temp:  [97.9 °F (36.6 °C)-98.5 °F (36.9 °C)] 98.4 °F (36.9 °C)  Pulse:  [] 102  Resp:  [17-20] 18  SpO2:  [95 %-98 %] 97 %  BP: (102-129)/(64-77) 129/77     Weight: 85.8 kg (189 lb 2.5 oz)  Body mass index is 28.76 kg/m².     Physical Exam  Vitals and nursing note reviewed.   Constitutional:       Appearance: Normal appearance. She is normal weight.   HENT:      Head: Normocephalic and atraumatic.      Right Ear: Tympanic membrane normal.      Left Ear: Tympanic membrane normal.      Nose: Nose normal.      Mouth/Throat:      Mouth: Mucous membranes are moist.   Eyes:      Extraocular Movements: Extraocular movements intact.      Conjunctiva/sclera: Conjunctivae normal.      Pupils: Pupils are equal, round, and reactive to light.   Cardiovascular:      Rate and Rhythm: Normal rate. Rhythm irregular.      Pulses: Normal pulses.   Pulmonary:      Effort: Pulmonary effort is normal.      Breath sounds: Normal breath sounds.   Abdominal:      General: Abdomen is flat. Bowel sounds are normal.      Palpations: Abdomen is soft.   Musculoskeletal:         General: Normal range of motion.      Cervical back: Normal range of motion and neck supple.   Skin:     General: Skin is warm and dry.   Neurological:      General: No focal deficit present.      Mental Status: She is alert and oriented to person, place, and time.   Psychiatric:          Mood and Affect: Mood normal.              CRANIAL NERVES     CN III, IV, VI   Pupils are equal, round, and reactive to light.       Significant Labs: All pertinent labs within the past 24 hours have been reviewed.    Significant Imaging: I have reviewed all pertinent imaging results/findings within the past 24 hours.  Assessment/Plan:     No notes have been filed under this hospital service.  Service: Hospital Medicine    VTE Risk Mitigation (From admission, onward)           Ordered     apixaban tablet 5 mg  2 times daily         05/07/24 2025     IP VTE HIGH RISK PATIENT  Once         05/07/24 1445     Place GHAZAL hose  Until discontinued         05/07/24 1446                                    Catrachita Hyman MD  Department of Hospital Medicine  [facility]

## 2024-05-08 NOTE — PLAN OF CARE
Problem: Adult Inpatient Plan of Care  Goal: Plan of Care Review  Outcome: Progressing  Flowsheets (Taken 5/8/2024 1513)  Plan of Care Reviewed With: patient     Problem: Fall Injury Risk  Goal: Absence of Fall and Fall-Related Injury  Outcome: Progressing     Problem: Wound  Goal: Optimal Functional Ability  Outcome: Progressing  Intervention: Optimize Functional Ability  Flowsheets (Taken 5/8/2024 1513)  Activity Management: Up in chair - L3  Goal: Skin Health and Integrity  Outcome: Progressing  Intervention: Optimize Skin Protection  Flowsheets (Taken 5/8/2024 1513)  Pressure Reduction Techniques:   frequent weight shift encouraged   pressure points protected   sit time limited to 2 hours   weight shift assistance provided  Activity Management: Up in chair - L3

## 2024-05-08 NOTE — PLAN OF CARE
Problem: Breathing Pattern Ineffective  Goal: Effective Breathing Pattern  Outcome: Progressing     Problem: Gas Exchange Impaired  Goal: Optimal Gas Exchange  Outcome: Progressing

## 2024-05-09 LAB
ANION GAP SERPL CALCULATED.3IONS-SCNC: 10 MMOL/L (ref 7–16)
BASOPHILS # BLD AUTO: 0.06 K/UL (ref 0–0.2)
BASOPHILS NFR BLD AUTO: 0.7 % (ref 0–1)
BUN SERPL-MCNC: 17 MG/DL (ref 7–18)
BUN/CREAT SERPL: 36 (ref 6–20)
CALCIUM SERPL-MCNC: 8.6 MG/DL (ref 8.5–10.1)
CHLORIDE SERPL-SCNC: 102 MMOL/L (ref 98–107)
CO2 SERPL-SCNC: 28 MMOL/L (ref 21–32)
CREAT SERPL-MCNC: 0.47 MG/DL (ref 0.55–1.02)
DIFFERENTIAL METHOD BLD: ABNORMAL
EGFR (NO RACE VARIABLE) (RUSH/TITUS): 89 ML/MIN/1.73M2
EOSINOPHIL # BLD AUTO: 0.72 K/UL (ref 0–0.5)
EOSINOPHIL NFR BLD AUTO: 8.8 % (ref 1–4)
ERYTHROCYTE [DISTWIDTH] IN BLOOD BY AUTOMATED COUNT: 14.5 % (ref 11.5–14.5)
GLUCOSE SERPL-MCNC: 104 MG/DL (ref 74–106)
HCT VFR BLD AUTO: 26.9 % (ref 38–47)
HGB BLD-MCNC: 8.7 G/DL (ref 12–16)
IMM GRANULOCYTES # BLD AUTO: 0.05 K/UL (ref 0–0.04)
IMM GRANULOCYTES NFR BLD: 0.6 % (ref 0–0.4)
LYMPHOCYTES # BLD AUTO: 1.72 K/UL (ref 1–4.8)
LYMPHOCYTES NFR BLD AUTO: 21 % (ref 27–41)
MCH RBC QN AUTO: 29.4 PG (ref 27–31)
MCHC RBC AUTO-ENTMCNC: 32.3 G/DL (ref 32–36)
MCV RBC AUTO: 90.9 FL (ref 80–96)
MONOCYTES # BLD AUTO: 1.18 K/UL (ref 0–0.8)
MONOCYTES NFR BLD AUTO: 14.4 % (ref 2–6)
MPC BLD CALC-MCNC: 9.7 FL (ref 9.4–12.4)
NEUTROPHILS # BLD AUTO: 4.48 K/UL (ref 1.8–7.7)
NEUTROPHILS NFR BLD AUTO: 54.5 % (ref 53–65)
NRBC # BLD AUTO: 0 X10E3/UL
NRBC, AUTO (.00): 0 %
OHS QRS DURATION: 86 MS
OHS QTC CALCULATION: 454 MS
PLATELET # BLD AUTO: 391 K/UL (ref 150–400)
POTASSIUM SERPL-SCNC: 4.1 MMOL/L (ref 3.5–5.1)
RBC # BLD AUTO: 2.96 M/UL (ref 4.2–5.4)
SODIUM SERPL-SCNC: 136 MMOL/L (ref 136–145)
WBC # BLD AUTO: 8.21 K/UL (ref 4.5–11)

## 2024-05-09 PROCEDURE — 97110 THERAPEUTIC EXERCISES: CPT | Mod: CQ

## 2024-05-09 PROCEDURE — 25000242 PHARM REV CODE 250 ALT 637 W/ HCPCS: Performed by: FAMILY MEDICINE

## 2024-05-09 PROCEDURE — 25000003 PHARM REV CODE 250: Performed by: EMERGENCY MEDICINE

## 2024-05-09 PROCEDURE — 36415 COLL VENOUS BLD VENIPUNCTURE: CPT | Performed by: FAMILY MEDICINE

## 2024-05-09 PROCEDURE — 97110 THERAPEUTIC EXERCISES: CPT

## 2024-05-09 PROCEDURE — 85025 COMPLETE CBC W/AUTO DIFF WBC: CPT | Performed by: FAMILY MEDICINE

## 2024-05-09 PROCEDURE — 94761 N-INVAS EAR/PLS OXIMETRY MLT: CPT

## 2024-05-09 PROCEDURE — 63600175 PHARM REV CODE 636 W HCPCS: Performed by: FAMILY MEDICINE

## 2024-05-09 PROCEDURE — 11000004 HC SNF PRIVATE

## 2024-05-09 PROCEDURE — 94640 AIRWAY INHALATION TREATMENT: CPT

## 2024-05-09 PROCEDURE — 80048 BASIC METABOLIC PNL TOTAL CA: CPT | Performed by: FAMILY MEDICINE

## 2024-05-09 PROCEDURE — 25000003 PHARM REV CODE 250: Performed by: FAMILY MEDICINE

## 2024-05-09 PROCEDURE — 97116 GAIT TRAINING THERAPY: CPT | Mod: CQ

## 2024-05-09 PROCEDURE — 27000221 HC OXYGEN, UP TO 24 HOURS

## 2024-05-09 RX ADMIN — ALBUTEROL SULFATE 2.5 MG: 2.5 SOLUTION RESPIRATORY (INHALATION) at 12:05

## 2024-05-09 RX ADMIN — SENNOSIDES AND DOCUSATE SODIUM 1 TABLET: 8.6; 5 TABLET ORAL at 08:05

## 2024-05-09 RX ADMIN — Medication 6 MG: at 11:05

## 2024-05-09 RX ADMIN — MEROPENEM 500 MG: 500 INJECTION, POWDER, FOR SOLUTION INTRAVENOUS at 01:05

## 2024-05-09 RX ADMIN — APIXABAN 5 MG: 2.5 TABLET, FILM COATED ORAL at 08:05

## 2024-05-09 RX ADMIN — MEROPENEM 500 MG: 500 INJECTION, POWDER, FOR SOLUTION INTRAVENOUS at 08:05

## 2024-05-09 RX ADMIN — DOCUSATE SODIUM 100 MG: 100 CAPSULE, LIQUID FILLED ORAL at 08:05

## 2024-05-09 RX ADMIN — BUDESONIDE INHALATION 0.5 MG: 0.5 SUSPENSION RESPIRATORY (INHALATION) at 07:05

## 2024-05-09 RX ADMIN — LOSARTAN POTASSIUM 50 MG: 25 TABLET, FILM COATED ORAL at 08:05

## 2024-05-09 RX ADMIN — PAROXETINE HYDROCHLORIDE 20 MG: 10 TABLET, FILM COATED ORAL at 08:05

## 2024-05-09 RX ADMIN — ATORVASTATIN CALCIUM 10 MG: 10 TABLET, FILM COATED ORAL at 08:05

## 2024-05-09 RX ADMIN — MEROPENEM 500 MG: 500 INJECTION, POWDER, FOR SOLUTION INTRAVENOUS at 05:05

## 2024-05-09 RX ADMIN — ALBUTEROL SULFATE 2.5 MG: 2.5 SOLUTION RESPIRATORY (INHALATION) at 07:05

## 2024-05-09 RX ADMIN — ACETAMINOPHEN 650 MG: 325 TABLET ORAL at 11:05

## 2024-05-09 NOTE — PLAN OF CARE
[facility] - Swing Bed   Interdisciplinary Team Meeting    Patient: Verito Weston   Today's Date: 5/9/2024   Estimated D/C Date: 5/27/2024        Physician: Catrachita Hyman MD Unit Director: Sera Ramirez RN   Pharmacy: Leah Mejía, PharmD Nursing: RADHA Trinidad   : Leesa Mar RN Physical/Occupational Therapy: Leann Rowley OT, SUDARSHAN Gomes PT   Speech Therapy: ST Lam Respiratory: See respiratory notes   Dietary: See dietary notes   Other: n/a     Nursing  New Symptoms/Problems: none      Urine: incontinent  Anderson: No   Bowel: continent  Last Bowel Movement: 05/07/24   Constipated: No  Diarrhea: No   Isolation: yes  Cognition: WNL  Aspiration Precautions: No  Wound Care: No  Wound Location/Tx: n/a  Comment(s): n/a     Respiratory  O2 Device: Nasal Cannula  O2 Flow: 2l  SpO2: 100%  Neb Tx: Yes  Comment(s): n/a     Dietary  Nutrition: Regular  Comment(s): n/a    Speech Therapy  Speech/Swallowing: No current speech or swallowing issues  Comment(s): n/a    Physical Therapy  Gait/Assistive Device: 15 feet with rolling walker and min A  ELOS: Plan to DC 5/27/2024    Transfers: Moderate Assistance  Bed Mobility: Activity did not occur Range of Motion/Restrictions: as tolerated  Comment(s): n/a      Occupational Therapy  Eating/Grooming: Supervision or Set-up Assistance Toileting: Moderate Assistance   Bathing: Moderate Assistance Dressing (Upper Body): Minimal Assistance   Dressing (Lower Body): Moderate Assistance   Comment(s): n/a   Activity Therapy  Level of participation: Active participation  Comment(s): n/a    Pharmacy  Medication Changes: No  Labs Reviewed: Yes  New Lab Orders: No  Comment(s): n/a      Tx Plan/Recommendations reviewed with family and/or patient on 5/8/24.  Additional family Conference/Training: not at this time  D/C Plan/Recommendations: Home with HH and Home with family  TREASURE: 5/27/2024  Comment(s): n/a

## 2024-05-09 NOTE — PLAN OF CARE
Problem: Adult Inpatient Plan of Care  Goal: Plan of Care Review  Outcome: Progressing  Flowsheets (Taken 5/9/2024 3811)  Plan of Care Reviewed With: patient     Problem: Fall Injury Risk  Goal: Absence of Fall and Fall-Related Injury  Outcome: Progressing  Intervention: Promote Injury-Free Environment  Flowsheets (Taken 5/9/2024 9177)  Safety Promotion/Fall Prevention:   assistive device/personal item within reach   muscle strengthening facilitated   nonskid shoes/socks when out of bed   instructed to call staff for mobility

## 2024-05-09 NOTE — PT/OT/SLP PROGRESS
Occupational Therapy   Treatment    Name: Verito Weston  MRN: [id number]  Admitting Diagnosis:  Muscular weakness       Recommendations:     Discharge Recommendations:    Discharge Equipment Recommendations:  none  Barriers to discharge:       Assessment:     Verito Weston is a 89 y.o. female with a medical diagnosis of Muscular weakness.   Performance deficits affecting function are weakness, impaired endurance, impaired self care skills, impaired functional mobility, impaired balance, decreased lower extremity function, decreased safety awareness.     Rehab Prognosis:  Good; patient would benefit from acute skilled OT services to address these deficits and reach maximum level of function.       Plan:     Patient to be seen 5 x/week to address the above listed problems via therapeutic exercises, therapeutic activities, self-care/home management  Plan of Care Expires:    Plan of Care Reviewed with: patient    Subjective     Chief Complaint: Shoulder pain  Patient/Family Comments/goals: Go home  Pain/Comfort:  Pain Rating 1: 0/10    Objective:     Communicated with: RN prior to session.  Patient found up in chair with   upon OT entry to room.    General Precautions: Standard, fall    Orthopedic Precautions:   Braces:    Respiratory Status: Nasal cannula, flow 2 L/min     Occupational Performance:     Bed Mobility:    Patient was up in chair already    Functional Mobility/Transfers:  Patient completed Sit <> Stand Transfer with minimum assistance  with  rolling walker   Functional Mobility: N/A    Activities of Daily Living:  Feeding S/u   Grooming S/u   Bathing Mod a   UB dsg S/u   LB dsg Mod a   Toileting Min a   Toilet t/f Min a           AMPAC 6 Click ADL: 19    Treatment & Education:  Pt educated on OT role/POC.   Importance of OOB activity with staff assistance.  Importance of sitting up in the chair throughout the day as tolerated, especially for meals   Safety during functional t/f and mobility  Importance  of assisting with self-care activities     Patient completed the following for increased strength to increase I with ADLs: UBE 8 min x 1x, 2# dowel- chest press, bicep curls x 30, yellow theraflex x 40 both ways, yellow theraband- scapular retraction x 40      Patient left up in chair with call button in reach    GOALS:   Multidisciplinary Problems       Occupational Therapy Goals          Problem: Occupational Therapy    Goal Priority Disciplines Outcome Interventions   Occupational Therapy Goal     OT, PT/OT     Description: Description: Grooming Status:   Short Term Goal: Pt will perform grooming with s/u sitting EOB.   Long Term Goal: Pt will perform grooming/oral hygiene standing at sink with Mod I      LE dressing Status:   Short Term Goal: Pt will perform LE dressing with min a.   Long Term Goal: Pt will perform LE dressing with s/u.    Toileting Status:   Short Term Goal: Pt will perform toilet hygiene on BSC with min a.  Long Term Goal: Pt will perform toilet hygiene on toilet with no AE with s/u.    Commode Transfer:   Short Term Goal: Pt will perform BSC t/f with s/u.  Long Term Goal:  Pt will perform toilet t/f in bathroom with mod I.     Bathing Status:   Long Term Goal: Pt will perform sponge bath with s/u with no unsafe fatigue.     Strength Status:   Long Term Goal: Pt to perform BUE strengthening with weights and/or body weight to increase ADL independence and safety    Endurance Status:   Short Term Goal:pt to perform 15 min OT treatment with 5 or greater rest breaks  Long Term Goal: pt to perform 30 min OT treat with 3 or less rest breaks                       Time Tracking:     OT Date of Treatment: 05/09/24  OT Start Time: 1033  OT Stop Time: 1104  OT Total Time (min): 31 min    Billable Minutes:Therapeutic Exercise 31 min  Leann Rowley OTR/L      5/9/2024

## 2024-05-09 NOTE — PT/OT/SLP PROGRESS
Physical Therapy Treatment    Patient Name:  Verito Weston   MRN:  [id number]    Recommendations:     Discharge Recommendations: Low Intensity Therapy  Discharge Equipment Recommendations: none  Barriers to discharge: Decreased caregiver support    Assessment:     Verito Weston is a 89 y.o. female admitted with a medical diagnosis of Muscular weakness.  She presents with the following impairments/functional limitations: weakness, impaired endurance, impaired self care skills, impaired functional mobility, gait instability, impaired balance, decreased upper extremity function, decreased lower extremity function, pain .  Patient's impairments have resulted in decrease safety and idependence with functional mobility and ADLs resulting in decreased ability to return home at this time.  Patient reports pain in Left knee which limits activity tolerance of Therapeutic exercises and distance during gait training.      Rehab Prognosis: Good; patient would benefit from acute skilled PT services to address these deficits and reach maximum level of function.    Recent Surgery: * No surgery found *      Plan:     During this hospitalization, patient to be seen 5 x/week to address the identified rehab impairments via gait training, therapeutic activities, therapeutic exercises and progress toward the following goals:    Plan of Care Expires:  05/28/24    Subjective     Chief Complaint: weakness and decreased mobility   Patient/Family Comments/goals:  improve safety and independence with mobility for safe return home.   Pain/Comfort:  Pain Rating 1: 4/10  Location - Side 1: Left  Location - Orientation 1: generalized  Location 1: knee  Pain Addressed 1: Cessation of Activity, Reposition      Objective:     Communicated with supervising physical therapist John Gomes DPT and skilled nursing prior to session.  Patient found up in chair with peripheral IV, oxygen upon PT entry to room.     General Precautions: Standard,  "fall  Orthopedic Precautions: N/A  Braces: N/A  Respiratory Status: Nasal cannula, flow 2 L/min     Functional Mobility:  Transfers:     Sit to Stand:  moderate assistance with rolling walker  Gait: Approx. 30' with RW and mod assist x 1 on level indoor surface       AM-PAC 6 CLICK MOBILITY  Turning over in bed (including adjusting bedclothes, sheets and blankets)?: 3  Sitting down on and standing up from a chair with arms (e.g., wheelchair, bedside commode, etc.): 3  Moving from lying on back to sitting on the side of the bed?: 2  Moving to and from a bed to a chair (including a wheelchair)?: 3  Need to walk in hospital room?: 3  Climbing 3-5 steps with a railing?: 1 (Not attempted)  Basic Mobility Total Score: 15       Treatment & Education:  Therapeutic exercises to bilateral LE's    Ther-Ex Reps       Ankle pumps 30 x    Quad sets 30 x 3"   Horizontal Hip Abduction/Hip ADD    Hip ADD 2 x 10   Heelslides    LAQ's 2 x 10   Hamstring curls    Seated Marching 2 x 10    Hip ABD    Quad sets                  Patient left up in chair with all lines intact..    GOALS:   Multidisciplinary Problems       Physical Therapy Goals          Problem: Physical Therapy    Goal Priority Disciplines Outcome Goal Variances Interventions   Physical Therapy Goal     PT, PT/OT      Description: Short Term Goals  1. Patient will complete 30 reps of B LE exercises with correct form.   2. Patient will complete sit<>stand transfers with SBA.  3. Patient will ambulate 100 feet with RW on level surfaces with CGA.     Long Term Goals   1. Patient will ambulate 150 feet with RW on level and unlevel surfaces with SBA.  2. Patient will complete all functional transfers with MOD I.                        Time Tracking:     PT Received On: 05/09/24  PT Start Time: 1355     PT Stop Time: 1425  PT Total Time (min): 30 min     Billable Minutes: Gait Training 8 and Therapeutic Exercise 20    Treatment Type: Treatment  PT/PTA: PTA     Number of PTA " visits since last PT visit: 1     Continue Plan of Care per PT order to progress patient toward rehab goals as tolerated by patient.   KATRINA Jolley   05/09/2024

## 2024-05-10 PROBLEM — I48.91 ATRIAL FIBRILLATION: Status: ACTIVE | Noted: 2023-09-20

## 2024-05-10 PROBLEM — E78.5 HYPERLIPIDEMIA: Status: ACTIVE | Noted: 2023-09-20

## 2024-05-10 PROBLEM — N18.2 STAGE 2 CHRONIC KIDNEY DISEASE: Status: ACTIVE | Noted: 2023-09-20

## 2024-05-10 PROBLEM — I80.9 DEEP THROMBOPHLEBITIS: Status: ACTIVE | Noted: 2023-09-20

## 2024-05-10 PROCEDURE — 94640 AIRWAY INHALATION TREATMENT: CPT

## 2024-05-10 PROCEDURE — 94761 N-INVAS EAR/PLS OXIMETRY MLT: CPT

## 2024-05-10 PROCEDURE — 25000003 PHARM REV CODE 250: Performed by: FAMILY MEDICINE

## 2024-05-10 PROCEDURE — 97110 THERAPEUTIC EXERCISES: CPT

## 2024-05-10 PROCEDURE — 11000004 HC SNF PRIVATE

## 2024-05-10 PROCEDURE — 99308 SBSQ NF CARE LOW MDM 20: CPT | Mod: ,,, | Performed by: FAMILY MEDICINE

## 2024-05-10 PROCEDURE — 97110 THERAPEUTIC EXERCISES: CPT | Mod: CQ

## 2024-05-10 PROCEDURE — 25000242 PHARM REV CODE 250 ALT 637 W/ HCPCS: Performed by: EMERGENCY MEDICINE

## 2024-05-10 PROCEDURE — 25000003 PHARM REV CODE 250: Performed by: EMERGENCY MEDICINE

## 2024-05-10 PROCEDURE — 99900035 HC TECH TIME PER 15 MIN (STAT)

## 2024-05-10 PROCEDURE — 27000221 HC OXYGEN, UP TO 24 HOURS

## 2024-05-10 PROCEDURE — 63600175 PHARM REV CODE 636 W HCPCS: Performed by: FAMILY MEDICINE

## 2024-05-10 PROCEDURE — 25000242 PHARM REV CODE 250 ALT 637 W/ HCPCS: Performed by: FAMILY MEDICINE

## 2024-05-10 PROCEDURE — 97116 GAIT TRAINING THERAPY: CPT | Mod: CQ

## 2024-05-10 RX ADMIN — BUDESONIDE INHALATION 0.5 MG: 0.5 SUSPENSION RESPIRATORY (INHALATION) at 07:05

## 2024-05-10 RX ADMIN — SENNOSIDES AND DOCUSATE SODIUM 1 TABLET: 8.6; 5 TABLET ORAL at 08:05

## 2024-05-10 RX ADMIN — IPRATROPIUM BROMIDE AND ALBUTEROL SULFATE 3 ML: .5; 3 SOLUTION RESPIRATORY (INHALATION) at 07:05

## 2024-05-10 RX ADMIN — POLYETHYLENE GLYCOL 3350 17 G: 17 POWDER, FOR SOLUTION ORAL at 08:05

## 2024-05-10 RX ADMIN — APIXABAN 5 MG: 2.5 TABLET, FILM COATED ORAL at 08:05

## 2024-05-10 RX ADMIN — ALBUTEROL SULFATE 2.5 MG: 2.5 SOLUTION RESPIRATORY (INHALATION) at 01:05

## 2024-05-10 RX ADMIN — LOSARTAN POTASSIUM 50 MG: 25 TABLET, FILM COATED ORAL at 08:05

## 2024-05-10 RX ADMIN — DOCUSATE SODIUM 100 MG: 100 CAPSULE, LIQUID FILLED ORAL at 08:05

## 2024-05-10 RX ADMIN — PAROXETINE HYDROCHLORIDE 20 MG: 10 TABLET, FILM COATED ORAL at 08:05

## 2024-05-10 RX ADMIN — ALBUTEROL SULFATE 2.5 MG: 2.5 SOLUTION RESPIRATORY (INHALATION) at 07:05

## 2024-05-10 RX ADMIN — ACETAMINOPHEN 650 MG: 325 TABLET ORAL at 08:05

## 2024-05-10 RX ADMIN — MEROPENEM 500 MG: 500 INJECTION, POWDER, FOR SOLUTION INTRAVENOUS at 12:05

## 2024-05-10 RX ADMIN — MEROPENEM 500 MG: 500 INJECTION, POWDER, FOR SOLUTION INTRAVENOUS at 05:05

## 2024-05-10 RX ADMIN — ALBUTEROL SULFATE 2.5 MG: 2.5 SOLUTION RESPIRATORY (INHALATION) at 12:05

## 2024-05-10 RX ADMIN — ATORVASTATIN CALCIUM 10 MG: 10 TABLET, FILM COATED ORAL at 08:05

## 2024-05-10 RX ADMIN — MEROPENEM 500 MG: 500 INJECTION, POWDER, FOR SOLUTION INTRAVENOUS at 08:05

## 2024-05-10 NOTE — HOSPITAL COURSE
5/10/24 - doing well this AM. Voices no complaint. Will continue present treatment.    5/13/24 - pt. Is enjoying her Purwick. Wants one at home. She needs to get up more. Will continue present treatment.    5/17/24 - IV line is out. Pt. Doing well. No complaints voiced.    5/20/24 - pt. Having some vaginal itching. Orders revised.    5/24/24 - pt. Has done well. She wants a Purwick for home use. She has stress urinary incontinence. Home health will follow patient at home.

## 2024-05-10 NOTE — PLAN OF CARE
Problem: Adult Inpatient Plan of Care  Goal: Plan of Care Review  Outcome: Progressing  Goal: Patient-Specific Goal (Individualized)  Outcome: Progressing     Problem: Fall Injury Risk  Goal: Absence of Fall and Fall-Related Injury  Outcome: Progressing  Intervention: Identify and Manage Contributors  Flowsheets (Taken 5/9/2024 2241)  Self-Care Promotion:   independence encouraged   BADL personal objects within reach   BADL personal routines maintained   adaptive equipment use encouraged  Medication Review/Management: medications reviewed  Intervention: Promote Injury-Free Environment  Flowsheets (Taken 5/9/2024 2241)  Safety Promotion/Fall Prevention:   assistive device/personal item within reach   chair alarm set   in recliner, wheels locked   lighting adjusted   medications reviewed   muscle strengthening facilitated   nonskid shoes/socks when out of bed   instructed to call staff for mobility     Problem: Skin Injury Risk Increased  Goal: Skin Health and Integrity  Outcome: Progressing     Problem: Wound  Goal: Optimal Functional Ability  Outcome: Progressing  Goal: Skin Health and Integrity  Outcome: Progressing

## 2024-05-10 NOTE — PT/OT/SLP PROGRESS
Physical Therapy Treatment    Patient Name:  Verito Weston   MRN:  [id number]    Recommendations:     Discharge Recommendations: Low Intensity Therapy  Discharge Equipment Recommendations: none  Barriers to discharge: Decreased caregiver support    Assessment:     Verito Weston is a 89 y.o. female admitted with a medical diagnosis of Muscular weakness.  She presents with the following impairments/functional limitations: weakness, impaired endurance, impaired self care skills, impaired functional mobility, impaired balance, decreased upper extremity function, decreased lower extremity function, decreased safety awareness .  Patient's impairments have resulted in decrease safety and idependence with functional mobility and ADLs resulting in decreased ability to return home at this time. Pt displays high motivation to complete full tx session and ambulate farther than previous gait attempt. Pt progressed through tx with no c/ pain. No adverse effects noted.      Rehab Prognosis: Good; patient would benefit from acute skilled PT services to address these deficits and reach maximum level of function.    Recent Surgery: * No surgery found *      Plan:     During this hospitalization, patient to be seen 5 x/week to address the identified rehab impairments via gait training, therapeutic activities, therapeutic exercises and progress toward the following goals:    Plan of Care Expires:  05/28/24    Subjective     Chief Complaint: weakness and decreased mobility   Patient/Family Comments/goals:  improve safety and independence with mobility for safe return home.   Pain/Comfort:  Pain Rating 1: 0/10      Objective:     Communicated with supervising physical therapist John Gomes DPT and skilled nursing prior to session.  Patient found up in chair with peripheral IV, oxygen upon PT entry to room.     General Precautions: Standard, fall  Orthopedic Precautions: N/A  Braces: N/A  Respiratory Status: Nasal cannula, flow 2  "L/min     Functional Mobility:  Transfers:     Sit to Stand:  moderate assistance with rolling walker  Gait: Approx. 120' with RW and mod assist x 1 on level indoor surface       AM-PAC 6 CLICK MOBILITY  Turning over in bed (including adjusting bedclothes, sheets and blankets)?: 3  Sitting down on and standing up from a chair with arms (e.g., wheelchair, bedside commode, etc.): 3  Moving from lying on back to sitting on the side of the bed?: 2  Moving to and from a bed to a chair (including a wheelchair)?: 3  Need to walk in hospital room?: 3  Climbing 3-5 steps with a railing?: 1  Basic Mobility Total Score: 15       Treatment & Education:  Therapeutic exercises to bilateral LE's    Ther-Ex Reps       Ankle pumps 30 x    Quad sets 30 x 3"   Horizontal Hip Abduction/Hip ADD 3 x 10 each   Hip ADD 2 x 10   Heelslides    LAQ's 2 x 10   Hamstring curls 2 x 10 yellow TB   Seated Marching 2 x 10    Hip ABD 2 x 10 yellow TB   Quad sets                  Patient left up in chair with all lines intact..    GOALS:   Multidisciplinary Problems       Physical Therapy Goals          Problem: Physical Therapy    Goal Priority Disciplines Outcome Goal Variances Interventions   Physical Therapy Goal     PT, PT/OT      Description: Short Term Goals  1. Patient will complete 30 reps of B LE exercises with correct form.   2. Patient will complete sit<>stand transfers with SBA.  3. Patient will ambulate 100 feet with RW on level surfaces with CGA.     Long Term Goals   1. Patient will ambulate 150 feet with RW on level and unlevel surfaces with SBA.  2. Patient will complete all functional transfers with MOD I.                        Time Tracking:     PT Received On: 05/10/24  PT Start Time: 1245     PT Stop Time: 1317  PT Total Time (min): 32 min     Billable Minutes: Gait Training 10 and Therapeutic Exercise 32    Treatment Type: Treatment  PT/PTA: PTA     Number of PTA visits since last PT visit: 2     Continue Plan of Care per PT " order to progress patient toward rehab goals as tolerated by patient.   KATRINA Gerardo   05/10/2024

## 2024-05-10 NOTE — PT/OT/SLP PROGRESS
Occupational Therapy   Treatment    Name: Verito Weston  MRN: [id number]  Admitting Diagnosis:  Muscular weakness       Recommendations:     Discharge Recommendations:    Discharge Equipment Recommendations:  none  Barriers to discharge:       Assessment:     Verito Weston is a 89 y.o. female with a medical diagnosis of Muscular weakness.   Performance deficits affecting function are weakness, impaired endurance, impaired self care skills, impaired functional mobility, impaired balance, decreased safety awareness, decreased lower extremity function, decreased upper extremity function.     Rehab Prognosis:  Good; patient would benefit from acute skilled OT services to address these deficits and reach maximum level of function.       Plan:     Patient to be seen 5 x/week to address the above listed problems via self-care/home management, therapeutic activities, therapeutic exercises  Plan of Care Expires:    Plan of Care Reviewed with: patient    Subjective     Chief Complaint: Shoulder pain  Patient/Family Comments/goals: Go home  Pain/Comfort:  Pain Rating 1: 4/10    Objective:     Communicated with: RN prior to session.  Patient found up in chair with   upon OT entry to room.    General Precautions: Standard, fall    Orthopedic Precautions:   Braces:    Respiratory Status: Nasal cannula, flow 2 L/min     Occupational Performance:     Bed Mobility:    Patient was up in chair already    Functional Mobility/Transfers:  Patient completed Sit <> Stand Transfer with minimum assistance  with  rolling walker   Functional Mobility: N/A    Activities of Daily Living:  Feeding S/u   Grooming S/u   Bathing Mod a   UB dsg S/u   LB dsg Mod a   Toileting Min a   Toilet t/f Min a           AMPAC 6 Click ADL: 19    Treatment & Education:  Pt educated on OT role/POC.   Importance of OOB activity with staff assistance.  Importance of sitting up in the chair throughout the day as tolerated, especially for meals   Safety during  functional t/f and mobility  Importance of assisting with self-care activities     Patient completed the following for increased strength to increase I with ADLs: UBE 8 min x 1x, 2# dowel- chest press, bicep curls x 30, yellow theraflex x 40 both ways, yellow theraband- scapular retraction x 40      Patient left up in chair with call button in reach    GOALS:   Multidisciplinary Problems       Occupational Therapy Goals          Problem: Occupational Therapy    Goal Priority Disciplines Outcome Interventions   Occupational Therapy Goal     OT, PT/OT     Description: Description: Grooming Status:   Short Term Goal: Pt will perform grooming with s/u sitting EOB.   Long Term Goal: Pt will perform grooming/oral hygiene standing at sink with Mod I      LE dressing Status:   Short Term Goal: Pt will perform LE dressing with min a.   Long Term Goal: Pt will perform LE dressing with s/u.    Toileting Status:   Short Term Goal: Pt will perform toilet hygiene on BSC with min a.  Long Term Goal: Pt will perform toilet hygiene on toilet with no AE with s/u.    Commode Transfer:   Short Term Goal: Pt will perform BSC t/f with s/u.  Long Term Goal:  Pt will perform toilet t/f in bathroom with mod I.     Bathing Status:   Long Term Goal: Pt will perform sponge bath with s/u with no unsafe fatigue.     Strength Status:   Long Term Goal: Pt to perform BUE strengthening with weights and/or body weight to increase ADL independence and safety    Endurance Status:   Short Term Goal:pt to perform 15 min OT treatment with 5 or greater rest breaks  Long Term Goal: pt to perform 30 min OT treat with 3 or less rest breaks                       Time Tracking:     OT Date of Treatment: 05/10/24  OT Start Time: 1025  OT Stop Time: 1100  OT Total Time (min): 35 min    Billable Minutes:Therapeutic Exercise 35 min  Leann Rowley OTR/L      5/10/2024

## 2024-05-10 NOTE — PROGRESS NOTES
[facility]  Hospital Medicine  Progress Note    Patient Name: Verito Weston  MRN: [id number]  Patient Class: IP- Swing   Admission Date: 5/7/2024  Length of Stay: 3 days  Attending Physician: Catrachita Hyman,*  Primary Care Provider: Maricel Avila MD        Subjective:     Principal Problem:Muscular weakness        HPI:  This 89 yr. Old WF has been admitted to [facility] for Swing Bed Services. 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 She was transfused with 4 units prior to this admission. She has recently been diagnosed with breast cancer again. She has SSS with a pacemaker. She has chronic AF. She is also hypertensive. She is here for therapy. She does have a UTI and is presently on merrem.    Overview/Hospital Course:  5/10/24 - doing well this AM. Voices no complaint. Will continue present treatment.    Interval History: doing well. Will continue present treatment.    Review of Systems  Objective:     Vital Signs (Most Recent):  Temp: 97.9 °F (36.6 °C) (05/10/24 0729)  Pulse: 76 (05/10/24 0729)  Resp: 18 (05/10/24 0729)  BP: 122/66 (05/10/24 0729)  SpO2: 97 % (05/10/24 0729) Vital Signs (24h Range):  Temp:  [97.9 °F (36.6 °C)-98 °F (36.7 °C)] 97.9 °F (36.6 °C)  Pulse:  [67-88] 76  Resp:  [18-20] 18  SpO2:  [96 %-98 %] 97 %  BP: ()/(61-66) 122/66     Weight: 85.8 kg (189 lb 2.5 oz)  Body mass index is 28.76 kg/m².  No intake or output data in the 24 hours ending 05/10/24 0807      Physical Exam        Significant Labs: All pertinent labs within the past 24 hours have been reviewed.    Significant Imaging: I have reviewed all pertinent imaging results/findings within the past 24 hours.    Assessment/Plan:      No notes have been filed under this hospital service.  Service: Hospital Medicine    VTE Risk Mitigation (From admission, onward)           Ordered     apixaban tablet 5 mg  2 times daily         05/07/24  2025     IP VTE HIGH RISK PATIENT  Once         05/07/24 1445     Place GHAZAL hose  Until discontinued         05/07/24 1446                    Discharge Planning   TREASURE: 5/27/2024     Code Status: Full Code   Is the patient medically ready for discharge?:     Reason for patient still in hospital (select all that apply): Patient trending condition  Discharge Plan A: Home Health, Home                  Catrachita Hyman MD  Department of Hospital Medicine   [facility] - Medical Surgical Unit

## 2024-05-10 NOTE — PLAN OF CARE
Problem: Adult Inpatient Plan of Care  Goal: Plan of Care Review  Outcome: Progressing  Goal: Patient-Specific Goal (Individualized)  Outcome: Progressing  Goal: Absence of Hospital-Acquired Illness or Injury  Outcome: Progressing  Goal: Optimal Comfort and Wellbeing  Outcome: Progressing  Goal: Readiness for Transition of Care  Outcome: Progressing     Problem: Fall Injury Risk  Goal: Absence of Fall and Fall-Related Injury  Outcome: Progressing     Problem: Skin Injury Risk Increased  Goal: Skin Health and Integrity  Outcome: Progressing     Problem: Wound  Goal: Optimal Coping  Outcome: Progressing  Goal: Optimal Functional Ability  Outcome: Progressing  Goal: Absence of Infection Signs and Symptoms  Outcome: Progressing  Goal: Improved Oral Intake  Outcome: Progressing  Goal: Optimal Pain Control and Function  Outcome: Progressing  Goal: Skin Health and Integrity  Outcome: Progressing  Goal: Optimal Wound Healing  Outcome: Progressing     Problem: Infection  Goal: Absence of Infection Signs and Symptoms  Outcome: Progressing     Problem: Breathing Pattern Ineffective  Goal: Effective Breathing Pattern  Outcome: Progressing     Problem: Gas Exchange Impaired  Goal: Optimal Gas Exchange  Outcome: Progressing

## 2024-05-10 NOTE — SUBJECTIVE & OBJECTIVE
Interval History: doing well. Will continue present treatment.    Review of Systems  Objective:     Vital Signs (Most Recent):  Temp: 97.9 °F (36.6 °C) (05/10/24 0729)  Pulse: 76 (05/10/24 0729)  Resp: 18 (05/10/24 0729)  BP: 122/66 (05/10/24 0729)  SpO2: 97 % (05/10/24 0729) Vital Signs (24h Range):  Temp:  [97.9 °F (36.6 °C)-98 °F (36.7 °C)] 97.9 °F (36.6 °C)  Pulse:  [67-88] 76  Resp:  [18-20] 18  SpO2:  [96 %-98 %] 97 %  BP: ()/(61-66) 122/66     Weight: 85.8 kg (189 lb 2.5 oz)  Body mass index is 28.76 kg/m².  No intake or output data in the 24 hours ending 05/10/24 0807      Physical Exam        Significant Labs: All pertinent labs within the past 24 hours have been reviewed.    Significant Imaging: I have reviewed all pertinent imaging results/findings within the past 24 hours.

## 2024-05-11 PROCEDURE — 11000004 HC SNF PRIVATE

## 2024-05-11 PROCEDURE — 99900035 HC TECH TIME PER 15 MIN (STAT)

## 2024-05-11 PROCEDURE — 25000003 PHARM REV CODE 250: Performed by: FAMILY MEDICINE

## 2024-05-11 PROCEDURE — 94761 N-INVAS EAR/PLS OXIMETRY MLT: CPT

## 2024-05-11 PROCEDURE — 63600175 PHARM REV CODE 636 W HCPCS: Performed by: FAMILY MEDICINE

## 2024-05-11 PROCEDURE — 25000003 PHARM REV CODE 250: Performed by: EMERGENCY MEDICINE

## 2024-05-11 PROCEDURE — 27000221 HC OXYGEN, UP TO 24 HOURS

## 2024-05-11 PROCEDURE — 25000242 PHARM REV CODE 250 ALT 637 W/ HCPCS: Performed by: FAMILY MEDICINE

## 2024-05-11 PROCEDURE — 94640 AIRWAY INHALATION TREATMENT: CPT

## 2024-05-11 RX ADMIN — PAROXETINE HYDROCHLORIDE 20 MG: 10 TABLET, FILM COATED ORAL at 08:05

## 2024-05-11 RX ADMIN — APIXABAN 5 MG: 2.5 TABLET, FILM COATED ORAL at 08:05

## 2024-05-11 RX ADMIN — BUDESONIDE INHALATION 0.5 MG: 0.5 SUSPENSION RESPIRATORY (INHALATION) at 07:05

## 2024-05-11 RX ADMIN — ATORVASTATIN CALCIUM 10 MG: 10 TABLET, FILM COATED ORAL at 08:05

## 2024-05-11 RX ADMIN — ACETAMINOPHEN 650 MG: 325 TABLET ORAL at 08:05

## 2024-05-11 RX ADMIN — SENNOSIDES AND DOCUSATE SODIUM 1 TABLET: 8.6; 5 TABLET ORAL at 08:05

## 2024-05-11 RX ADMIN — POLYETHYLENE GLYCOL 3350 17 G: 17 POWDER, FOR SOLUTION ORAL at 08:05

## 2024-05-11 RX ADMIN — DOCUSATE SODIUM 100 MG: 100 CAPSULE, LIQUID FILLED ORAL at 08:05

## 2024-05-11 RX ADMIN — ALBUTEROL SULFATE 2.5 MG: 2.5 SOLUTION RESPIRATORY (INHALATION) at 12:05

## 2024-05-11 RX ADMIN — ALBUTEROL SULFATE 2.5 MG: 2.5 SOLUTION RESPIRATORY (INHALATION) at 01:05

## 2024-05-11 RX ADMIN — MEROPENEM 500 MG: 500 INJECTION, POWDER, FOR SOLUTION INTRAVENOUS at 09:05

## 2024-05-11 RX ADMIN — MEROPENEM 500 MG: 500 INJECTION, POWDER, FOR SOLUTION INTRAVENOUS at 12:05

## 2024-05-11 RX ADMIN — ALBUTEROL SULFATE 2.5 MG: 2.5 SOLUTION RESPIRATORY (INHALATION) at 07:05

## 2024-05-11 NOTE — PLAN OF CARE
Problem: Adult Inpatient Plan of Care  Goal: Plan of Care Review  Outcome: Progressing  Flowsheets (Taken 5/10/2024 2020)  Plan of Care Reviewed With: patient  Goal: Patient-Specific Goal (Individualized)  Outcome: Progressing  Goal: Absence of Hospital-Acquired Illness or Injury  Outcome: Progressing  Intervention: Prevent Infection  Flowsheets (Taken 5/10/2024 2020)  Infection Prevention:   environmental surveillance performed   equipment surfaces disinfected   hand hygiene promoted   personal protective equipment utilized   rest/sleep promoted   single patient room provided  Goal: Optimal Comfort and Wellbeing  Outcome: Progressing  Intervention: Provide Person-Centered Care  Flowsheets (Taken 5/10/2024 2020)  Trust Relationship/Rapport:   care explained   choices provided   emotional support provided   empathic listening provided   questions encouraged   questions answered   reassurance provided   thoughts/feelings acknowledged  Goal: Readiness for Transition of Care  Outcome: Progressing  Intervention: Mutually Develop Transition Plan  Flowsheets (Taken 5/10/2024 2020)  Equipment Currently Used at Home: walker, rolling  Transportation Anticipated: family or friend will provide  Communicated TREASURE with patient/caregiver: Date not available/Unable to determine  Do you expect to return to your current living situation?: Yes  Do you have help at home or someone to help you manage your care at home?: Yes  Readmission within 30 days?: No  Do you currently have service(s) that help you manage your care at home?: No

## 2024-05-12 PROCEDURE — 27000221 HC OXYGEN, UP TO 24 HOURS

## 2024-05-12 PROCEDURE — 25000003 PHARM REV CODE 250: Performed by: EMERGENCY MEDICINE

## 2024-05-12 PROCEDURE — 11000004 HC SNF PRIVATE

## 2024-05-12 PROCEDURE — 25000003 PHARM REV CODE 250: Performed by: FAMILY MEDICINE

## 2024-05-12 PROCEDURE — 63600175 PHARM REV CODE 636 W HCPCS: Performed by: FAMILY MEDICINE

## 2024-05-12 PROCEDURE — 94640 AIRWAY INHALATION TREATMENT: CPT

## 2024-05-12 PROCEDURE — 25000242 PHARM REV CODE 250 ALT 637 W/ HCPCS: Performed by: FAMILY MEDICINE

## 2024-05-12 PROCEDURE — 94761 N-INVAS EAR/PLS OXIMETRY MLT: CPT

## 2024-05-12 PROCEDURE — 99900035 HC TECH TIME PER 15 MIN (STAT)

## 2024-05-12 RX ORDER — ALBUTEROL SULFATE 0.83 MG/ML
2.5 SOLUTION RESPIRATORY (INHALATION)
Status: DISCONTINUED | OUTPATIENT
Start: 2024-05-12 | End: 2024-05-24 | Stop reason: HOSPADM

## 2024-05-12 RX ADMIN — ALBUTEROL SULFATE 2.5 MG: 2.5 SOLUTION RESPIRATORY (INHALATION) at 07:05

## 2024-05-12 RX ADMIN — PAROXETINE HYDROCHLORIDE 20 MG: 10 TABLET, FILM COATED ORAL at 08:05

## 2024-05-12 RX ADMIN — MEROPENEM 500 MG: 500 INJECTION, POWDER, FOR SOLUTION INTRAVENOUS at 01:05

## 2024-05-12 RX ADMIN — ACETAMINOPHEN 650 MG: 325 TABLET ORAL at 08:05

## 2024-05-12 RX ADMIN — APIXABAN 5 MG: 2.5 TABLET, FILM COATED ORAL at 08:05

## 2024-05-12 RX ADMIN — ATORVASTATIN CALCIUM 10 MG: 10 TABLET, FILM COATED ORAL at 08:05

## 2024-05-12 RX ADMIN — MEROPENEM 500 MG: 500 INJECTION, POWDER, FOR SOLUTION INTRAVENOUS at 05:05

## 2024-05-12 RX ADMIN — BUDESONIDE INHALATION 0.5 MG: 0.5 SUSPENSION RESPIRATORY (INHALATION) at 07:05

## 2024-05-12 RX ADMIN — MEROPENEM 500 MG: 500 INJECTION, POWDER, FOR SOLUTION INTRAVENOUS at 08:05

## 2024-05-12 RX ADMIN — ALBUTEROL SULFATE 2.5 MG: 2.5 SOLUTION RESPIRATORY (INHALATION) at 01:05

## 2024-05-12 RX ADMIN — LOSARTAN POTASSIUM 50 MG: 25 TABLET, FILM COATED ORAL at 08:05

## 2024-05-12 NOTE — PLAN OF CARE
Problem: Adult Inpatient Plan of Care  Goal: Plan of Care Review  Outcome: Progressing  Flowsheets (Taken 5/11/2024 1954)  Plan of Care Reviewed With: patient  Goal: Patient-Specific Goal (Individualized)  Outcome: Progressing  Goal: Absence of Hospital-Acquired Illness or Injury  Outcome: Progressing  Intervention: Prevent Infection  Flowsheets (Taken 5/11/2024 1954)  Infection Prevention:   environmental surveillance performed   equipment surfaces disinfected   hand hygiene promoted   personal protective equipment utilized   rest/sleep promoted   single patient room provided  Goal: Optimal Comfort and Wellbeing  Outcome: Progressing  Intervention: Provide Person-Centered Care  Flowsheets (Taken 5/11/2024 1954)  Trust Relationship/Rapport:   care explained   choices provided   emotional support provided   reassurance provided   questions answered   thoughts/feelings acknowledged   questions encouraged   empathic listening provided  Goal: Readiness for Transition of Care  Outcome: Progressing  Intervention: Mutually Develop Transition Plan  Flowsheets (Taken 5/11/2024 1954)  Equipment Currently Used at Home: walker, rolling  Transportation Anticipated: family or friend will provide  Communicated TREASURE with patient/caregiver: Date not available/Unable to determine  Do you expect to return to your current living situation?: Yes  Do you have help at home or someone to help you manage your care at home?: Yes  Readmission within 30 days?: No  Do you currently have service(s) that help you manage your care at home?: No

## 2024-05-13 LAB
ANION GAP SERPL CALCULATED.3IONS-SCNC: 9 MMOL/L (ref 7–16)
BASOPHILS # BLD AUTO: 0.07 K/UL (ref 0–0.2)
BASOPHILS NFR BLD AUTO: 1 % (ref 0–1)
BUN SERPL-MCNC: 14 MG/DL (ref 7–18)
BUN/CREAT SERPL: 30 (ref 6–20)
CALCIUM SERPL-MCNC: 8.7 MG/DL (ref 8.5–10.1)
CHLORIDE SERPL-SCNC: 106 MMOL/L (ref 98–107)
CO2 SERPL-SCNC: 30 MMOL/L (ref 21–32)
CREAT SERPL-MCNC: 0.47 MG/DL (ref 0.55–1.02)
DIFFERENTIAL METHOD BLD: ABNORMAL
EGFR (NO RACE VARIABLE) (RUSH/TITUS): 89 ML/MIN/1.73M2
EOSINOPHIL # BLD AUTO: 0.7 K/UL (ref 0–0.5)
EOSINOPHIL NFR BLD AUTO: 10.4 % (ref 1–4)
ERYTHROCYTE [DISTWIDTH] IN BLOOD BY AUTOMATED COUNT: 14.9 % (ref 11.5–14.5)
GLUCOSE SERPL-MCNC: 91 MG/DL (ref 74–106)
HCT VFR BLD AUTO: 28.7 % (ref 38–47)
HGB BLD-MCNC: 9 G/DL (ref 12–16)
IMM GRANULOCYTES # BLD AUTO: 0.08 K/UL (ref 0–0.04)
IMM GRANULOCYTES NFR BLD: 1.2 % (ref 0–0.4)
LYMPHOCYTES # BLD AUTO: 1.98 K/UL (ref 1–4.8)
LYMPHOCYTES NFR BLD AUTO: 29.3 % (ref 27–41)
MCH RBC QN AUTO: 29.1 PG (ref 27–31)
MCHC RBC AUTO-ENTMCNC: 31.4 G/DL (ref 32–36)
MCV RBC AUTO: 92.9 FL (ref 80–96)
MONOCYTES # BLD AUTO: 0.83 K/UL (ref 0–0.8)
MONOCYTES NFR BLD AUTO: 12.3 % (ref 2–6)
MPC BLD CALC-MCNC: 9.6 FL (ref 9.4–12.4)
NEUTROPHILS # BLD AUTO: 3.09 K/UL (ref 1.8–7.7)
NEUTROPHILS NFR BLD AUTO: 45.8 % (ref 53–65)
NRBC # BLD AUTO: 0 X10E3/UL
NRBC, AUTO (.00): 0 %
PLATELET # BLD AUTO: 406 K/UL (ref 150–400)
POTASSIUM SERPL-SCNC: 4.1 MMOL/L (ref 3.5–5.1)
RBC # BLD AUTO: 3.09 M/UL (ref 4.2–5.4)
SODIUM SERPL-SCNC: 141 MMOL/L (ref 136–145)
WBC # BLD AUTO: 6.75 K/UL (ref 4.5–11)

## 2024-05-13 PROCEDURE — 11000004 HC SNF PRIVATE

## 2024-05-13 PROCEDURE — 25000003 PHARM REV CODE 250: Performed by: EMERGENCY MEDICINE

## 2024-05-13 PROCEDURE — 94640 AIRWAY INHALATION TREATMENT: CPT

## 2024-05-13 PROCEDURE — 97110 THERAPEUTIC EXERCISES: CPT | Mod: CQ

## 2024-05-13 PROCEDURE — 97116 GAIT TRAINING THERAPY: CPT | Mod: CQ

## 2024-05-13 PROCEDURE — 27000221 HC OXYGEN, UP TO 24 HOURS

## 2024-05-13 PROCEDURE — 94761 N-INVAS EAR/PLS OXIMETRY MLT: CPT

## 2024-05-13 PROCEDURE — 25000242 PHARM REV CODE 250 ALT 637 W/ HCPCS: Performed by: FAMILY MEDICINE

## 2024-05-13 PROCEDURE — 80048 BASIC METABOLIC PNL TOTAL CA: CPT | Performed by: FAMILY MEDICINE

## 2024-05-13 PROCEDURE — 36415 COLL VENOUS BLD VENIPUNCTURE: CPT | Performed by: FAMILY MEDICINE

## 2024-05-13 PROCEDURE — 99308 SBSQ NF CARE LOW MDM 20: CPT | Mod: ,,, | Performed by: FAMILY MEDICINE

## 2024-05-13 PROCEDURE — 97110 THERAPEUTIC EXERCISES: CPT

## 2024-05-13 PROCEDURE — 25000003 PHARM REV CODE 250: Performed by: FAMILY MEDICINE

## 2024-05-13 PROCEDURE — 99900035 HC TECH TIME PER 15 MIN (STAT)

## 2024-05-13 PROCEDURE — 63600175 PHARM REV CODE 636 W HCPCS: Performed by: FAMILY MEDICINE

## 2024-05-13 PROCEDURE — 85025 COMPLETE CBC W/AUTO DIFF WBC: CPT | Performed by: FAMILY MEDICINE

## 2024-05-13 RX ADMIN — BUDESONIDE INHALATION 0.5 MG: 0.5 SUSPENSION RESPIRATORY (INHALATION) at 07:05

## 2024-05-13 RX ADMIN — APIXABAN 5 MG: 2.5 TABLET, FILM COATED ORAL at 08:05

## 2024-05-13 RX ADMIN — LOSARTAN POTASSIUM 50 MG: 25 TABLET, FILM COATED ORAL at 08:05

## 2024-05-13 RX ADMIN — DOCUSATE SODIUM 100 MG: 100 CAPSULE, LIQUID FILLED ORAL at 08:05

## 2024-05-13 RX ADMIN — ALBUTEROL SULFATE 2.5 MG: 2.5 SOLUTION RESPIRATORY (INHALATION) at 01:05

## 2024-05-13 RX ADMIN — MEROPENEM 500 MG: 500 INJECTION, POWDER, FOR SOLUTION INTRAVENOUS at 08:05

## 2024-05-13 RX ADMIN — MEROPENEM 500 MG: 500 INJECTION, POWDER, FOR SOLUTION INTRAVENOUS at 01:05

## 2024-05-13 RX ADMIN — PAROXETINE HYDROCHLORIDE 20 MG: 10 TABLET, FILM COATED ORAL at 08:05

## 2024-05-13 RX ADMIN — MEROPENEM 500 MG: 500 INJECTION, POWDER, FOR SOLUTION INTRAVENOUS at 05:05

## 2024-05-13 RX ADMIN — ALBUTEROL SULFATE 2.5 MG: 2.5 SOLUTION RESPIRATORY (INHALATION) at 07:05

## 2024-05-13 RX ADMIN — SENNOSIDES AND DOCUSATE SODIUM 1 TABLET: 8.6; 5 TABLET ORAL at 08:05

## 2024-05-13 RX ADMIN — ATORVASTATIN CALCIUM 10 MG: 10 TABLET, FILM COATED ORAL at 08:05

## 2024-05-13 RX ADMIN — ACETAMINOPHEN 650 MG: 325 TABLET ORAL at 08:05

## 2024-05-13 NOTE — PLAN OF CARE
Problem: Occupational Therapy  Goal: Occupational Therapy Goal  Description: Description: Grooming Status:   Short Term Goal: Pt will perform grooming with s/u sitting EOB.   Long Term Goal: Pt will perform grooming/oral hygiene standing at sink with Mod I      LE dressing Status:   Short Term Goal: Pt will perform LE dressing with min a.   Long Term Goal: Pt will perform LE dressing with s/u.    Toileting Status:   Short Term Goal: Pt will perform toilet hygiene on BSC with min a.  Long Term Goal: Pt will perform toilet hygiene on toilet with no AE with s/u.    Commode Transfer:   Short Term Goal: Pt will perform BSC t/f with s/u.  Long Term Goal:  Pt will perform toilet t/f in bathroom with mod I.     Bathing Status:   Long Term Goal: Pt will perform sponge bath with s/u with no unsafe fatigue.     Strength Status:   Long Term Goal: Pt to perform BUE strengthening with weights and/or body weight to increase ADL independence and safety    Endurance Status:   Short Term Goal:pt to perform 15 min OT treatment with 5 or greater rest breaks  Long Term Goal: pt to perform 30 min OT treat with 3 or less rest breaks  Outcome: Progressing

## 2024-05-13 NOTE — PT/OT/SLP PROGRESS
Physical Therapy Treatment    Patient Name:  Verito Weston   MRN:  [id number]    Recommendations:     Discharge Recommendations: Low Intensity Therapy  Discharge Equipment Recommendations: none  Barriers to discharge: Decreased caregiver support    Assessment:     Verito Weston is a 89 y.o. female admitted with a medical diagnosis of Muscular weakness.  She presents with the following impairments/functional limitations: weakness, impaired endurance, impaired self care skills, impaired functional mobility, impaired balance, decreased upper extremity function, decreased lower extremity function, decreased safety awareness .  Patient's impairments have resulted in decrease safety and idependence with functional mobility and ADLs resulting in decreased ability to return home at this time. Pt progressed through tx while displaying improved strength and range of motion throughout tx. Pt ambulates with smooth isaias but gait trial ended secondary to fatigue. Pt progressed through tx with no c/o pain. No adverse effects noted.      Rehab Prognosis: Good; patient would benefit from acute skilled PT services to address these deficits and reach maximum level of function.    Recent Surgery: * No surgery found *      Plan:     During this hospitalization, patient to be seen 5 x/week to address the identified rehab impairments via gait training, therapeutic activities, therapeutic exercises and progress toward the following goals:    Plan of Care Expires:  05/28/24    Subjective     Chief Complaint: weakness and decreased mobility   Patient/Family Comments/goals:  improve safety and independence with mobility for safe return home.   Pain/Comfort:  Pain Rating 1: 0/10      Objective:     Communicated with OT and skilled nursing prior to session.  Patient found up in chair with peripheral IV, oxygen upon PT entry to room.     General Precautions: Standard, fall  Orthopedic Precautions: N/A  Braces: N/A  Respiratory Status: Nasal  "cannula, flow 2 L/min     Functional Mobility:  Transfers:     Sit to Stand:  moderate assistance with rolling walker  Gait: Approx. 100' with RW and mod assist x 1 on level indoor surface       AM-PAC 6 CLICK MOBILITY  Turning over in bed (including adjusting bedclothes, sheets and blankets)?: 3  Sitting down on and standing up from a chair with arms (e.g., wheelchair, bedside commode, etc.): 3  Moving from lying on back to sitting on the side of the bed?: 2  Moving to and from a bed to a chair (including a wheelchair)?: 3  Need to walk in hospital room?: 3  Climbing 3-5 steps with a railing?: 1  Basic Mobility Total Score: 15       Treatment & Education:  Therapeutic exercises to bilateral LE's    Ther-Ex Reps       Ankle pumps 30 x    Quad sets 30 x 3"   Horizontal Hip Abduction/Hip ADD 3 x 10 each   Hip ADD 2 x 10   Heelslides    LAQ's 2 x 10   Hamstring curls 2 x 10 yellow TB   Seated Marching 2 x 10    Hip ABD 2 x 10 yellow TB   Quad sets                  Patient left up in chair with all lines intact..    GOALS:   Multidisciplinary Problems       Physical Therapy Goals          Problem: Physical Therapy    Goal Priority Disciplines Outcome Goal Variances Interventions   Physical Therapy Goal     PT, PT/OT      Description: Short Term Goals  1. Patient will complete 30 reps of B LE exercises with correct form.   2. Patient will complete sit<>stand transfers with SBA.  3. Patient will ambulate 100 feet with RW on level surfaces with CGA.     Long Term Goals   1. Patient will ambulate 150 feet with RW on level and unlevel surfaces with SBA.  2. Patient will complete all functional transfers with MOD I.                        Time Tracking:     PT Received On: 05/13/24  PT Start Time: 0950     PT Stop Time: 1022  PT Total Time (min): 32 min     Billable Minutes: Gait Training 10 and Therapeutic Exercise 22    Treatment Type: Treatment  PT/PTA: PTA     Number of PTA visits since last PT visit: 3     Continue Plan " of Care per PT order to progress patient toward rehab goals as tolerated by patient.   KATRINA Gerardo   05/13/2024

## 2024-05-13 NOTE — PLAN OF CARE
Problem: Adult Inpatient Plan of Care  Goal: Plan of Care Review  Outcome: Progressing  Flowsheets (Taken 5/12/2024 1944)  Plan of Care Reviewed With: patient  Goal: Patient-Specific Goal (Individualized)  Outcome: Progressing  Goal: Absence of Hospital-Acquired Illness or Injury  Outcome: Progressing  Intervention: Prevent Infection  Flowsheets (Taken 5/12/2024 1944)  Infection Prevention:   environmental surveillance performed   equipment surfaces disinfected   hand hygiene promoted   personal protective equipment utilized   rest/sleep promoted   single patient room provided  Goal: Optimal Comfort and Wellbeing  Outcome: Progressing  Intervention: Provide Person-Centered Care  Flowsheets (Taken 5/12/2024 1944)  Trust Relationship/Rapport:   choices provided   care explained   emotional support provided   empathic listening provided   questions encouraged   thoughts/feelings acknowledged   reassurance provided   questions answered  Goal: Readiness for Transition of Care  Outcome: Progressing  Intervention: Mutually Develop Transition Plan  Flowsheets (Taken 5/12/2024 1944)  Equipment Currently Used at Home: walker, rolling  Transportation Anticipated: family or friend will provide  Communicated TREASURE with patient/caregiver: Date not available/Unable to determine  Do you expect to return to your current living situation?: Yes  Do you have help at home or someone to help you manage your care at home?: Yes  Readmission within 30 days?: No  Do you currently have service(s) that help you manage your care at home?: No

## 2024-05-13 NOTE — PT/OT/SLP PROGRESS
Occupational Therapy   Treatment    Name: Verito Weston  MRN: [id number]  Admitting Diagnosis:  Muscular weakness       Recommendations:     Discharge Recommendations:    Discharge Equipment Recommendations:  none  Barriers to discharge:       Assessment:     Verito Weston is a 89 y.o. female with a medical diagnosis of Muscular weakness.   Performance deficits affecting function are weakness, impaired endurance, impaired self care skills, impaired functional mobility, impaired balance, decreased lower extremity function, decreased safety awareness.     Rehab Prognosis:  Good; patient would benefit from acute skilled OT services to address these deficits and reach maximum level of function.       Plan:     Patient to be seen 5 x/week to address the above listed problems via self-care/home management, therapeutic activities, therapeutic exercises  Plan of Care Expires:    Plan of Care Reviewed with: patient    Subjective     Chief Complaint: Shoulder pain  Patient/Family Comments/goals: Go home  Pain/Comfort:  Pain Rating 1: 0/10    Objective:     Communicated with: RN prior to session.  Patient found up in chair with   upon OT entry to room.    General Precautions: Standard, fall    Orthopedic Precautions:   Braces:    Respiratory Status: Nasal cannula, flow 2 L/min     Occupational Performance:     Bed Mobility:    Patient was up in chair already    Functional Mobility/Transfers:  Patient completed Sit <> Stand Transfer with minimum assistance  with  rolling walker   Functional Mobility: N/A    Activities of Daily Living:  Feeding S/u   Grooming S/u   Bathing Mod a   UB dsg S/u   LB dsg Mod a   Toileting Min a   Toilet t/f Min a           AMPAC 6 Click ADL: 19    Treatment & Education:  Pt educated on OT role/POC.   Importance of OOB activity with staff assistance.  Importance of sitting up in the chair throughout the day as tolerated, especially for meals   Safety during functional t/f and mobility  Importance  of assisting with self-care activities     Patient completed the following for increased strength to increase I with ADLs:  2# dowel- chest press, bicep curls x 30, yellow theraflex x 40 both ways, yellow theraband- scapular retraction x 40      Patient left up in chair with call button in reach    GOALS:   Multidisciplinary Problems       Occupational Therapy Goals          Problem: Occupational Therapy    Goal Priority Disciplines Outcome Interventions   Occupational Therapy Goal     OT, PT/OT Progressing    Description: Description: Grooming Status:   Short Term Goal: Pt will perform grooming with s/u sitting EOB.   Long Term Goal: Pt will perform grooming/oral hygiene standing at sink with Mod I      LE dressing Status:   Short Term Goal: Pt will perform LE dressing with min a.   Long Term Goal: Pt will perform LE dressing with s/u.    Toileting Status:   Short Term Goal: Pt will perform toilet hygiene on BSC with min a.  Long Term Goal: Pt will perform toilet hygiene on toilet with no AE with s/u.    Commode Transfer:   Short Term Goal: Pt will perform BSC t/f with s/u.  Long Term Goal:  Pt will perform toilet t/f in bathroom with mod I.     Bathing Status:   Long Term Goal: Pt will perform sponge bath with s/u with no unsafe fatigue.     Strength Status:   Long Term Goal: Pt to perform BUE strengthening with weights and/or body weight to increase ADL independence and safety    Endurance Status:   Short Term Goal:pt to perform 15 min OT treatment with 5 or greater rest breaks  Long Term Goal: pt to perform 30 min OT treat with 3 or less rest breaks                       Time Tracking:     OT Date of Treatment: 05/13/24  OT Start Time: 1238  OT Stop Time: 1302  OT Total Time (min): 24 min    Billable Minutes:Therapeutic Exercise 24 min  KRYSTAL Sawant/ZULEYMA      5/13/2024

## 2024-05-13 NOTE — PROGRESS NOTES
[facility]  Hospital Medicine  Progress Note    Patient Name: Verito Weston  MRN: [id number]  Patient Class: IP- Swing   Admission Date: 5/7/2024  Length of Stay: 6 days  Attending Physician: Catrachita Hyman,*  Primary Care Provider: Maricel Avila MD        Subjective:     Principal Problem:Muscular weakness        HPI:  This 89 yr. Old WF has been admitted to [facility] for Swing Bed Services. 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 She was transfused with 4 units prior to this admission. She has recently been diagnosed with breast cancer again. She has SSS with a pacemaker. She has chronic AF. She is also hypertensive. She is here for therapy. She does have a UTI and is presently on merrem.    Overview/Hospital Course:  5/10/24 - doing well this AM. Voices no complaint. Will continue present treatment.    5/13/24 - pt. Is enjoying her [place]. Wants one at home. She needs to get up more. Will continue present treatment.    Interval History: will continue present treatment.    Review of Systems  Objective:     Vital Signs (Most Recent):  Temp: 98.1 °F (36.7 °C) (05/13/24 0726)  Pulse: 75 (05/13/24 0726)  Resp: 18 (05/13/24 0726)  BP: 124/72 (05/13/24 0726)  SpO2: 96 % (05/13/24 0726) Vital Signs (24h Range):  Temp:  [98.1 °F (36.7 °C)] 98.1 °F (36.7 °C)  Pulse:  [66-76] 75  Resp:  [18-20] 18  SpO2:  [96 %-98 %] 96 %  BP: (124-125)/(68-72) 124/72     Weight: 85.8 kg (189 lb 2.5 oz)  Body mass index is 28.76 kg/m².    Intake/Output Summary (Last 24 hours) at 5/13/2024 0824  Last data filed at 5/13/2024 0810  Gross per 24 hour   Intake 720 ml   Output 1800 ml   Net -1080 ml         Physical Exam        Significant Labs: All pertinent labs within the past 24 hours have been reviewed.    Significant Imaging: I have reviewed all pertinent imaging results/findings within the past 24 hours.    Assessment/Plan:      No notes have  been filed under this hospital service.  Service: Hospital Medicine    VTE Risk Mitigation (From admission, onward)           Ordered     apixaban tablet 5 mg  2 times daily         05/07/24 2025     IP VTE HIGH RISK PATIENT  Once         05/07/24 1445     Place GHAZAL hose  Until discontinued         05/07/24 1446                    Discharge Planning   TREASURE: 5/27/2024     Code Status: Full Code   Is the patient medically ready for discharge?:     Reason for patient still in hospital (select all that apply): Patient trending condition  Discharge Plan A: Home Health, Home                  Catrachita Hyman MD  Department of Hospital Medicine   [facility] - Medical Surgical Unit

## 2024-05-13 NOTE — SUBJECTIVE & OBJECTIVE
Interval History: will continue present treatment.    Review of Systems  Objective:     Vital Signs (Most Recent):  Temp: 98.1 °F (36.7 °C) (05/13/24 0726)  Pulse: 75 (05/13/24 0726)  Resp: 18 (05/13/24 0726)  BP: 124/72 (05/13/24 0726)  SpO2: 96 % (05/13/24 0726) Vital Signs (24h Range):  Temp:  [98.1 °F (36.7 °C)] 98.1 °F (36.7 °C)  Pulse:  [66-76] 75  Resp:  [18-20] 18  SpO2:  [96 %-98 %] 96 %  BP: (124-125)/(68-72) 124/72     Weight: 85.8 kg (189 lb 2.5 oz)  Body mass index is 28.76 kg/m².    Intake/Output Summary (Last 24 hours) at 5/13/2024 0824  Last data filed at 5/13/2024 0810  Gross per 24 hour   Intake 720 ml   Output 1800 ml   Net -1080 ml         Physical Exam        Significant Labs: All pertinent labs within the past 24 hours have been reviewed.    Significant Imaging: I have reviewed all pertinent imaging results/findings within the past 24 hours.

## 2024-05-13 NOTE — PLAN OF CARE
Problem: Adult Inpatient Plan of Care  Goal: Plan of Care Review  Outcome: Progressing  Flowsheets (Taken 5/13/2024 1325)  Plan of Care Reviewed With: patient  Goal: Optimal Comfort and Wellbeing  Outcome: Progressing  Intervention: Provide Person-Centered Care  Flowsheets (Taken 5/13/2024 1325)  Trust Relationship/Rapport:   care explained   reassurance provided   thoughts/feelings acknowledged   emotional support provided   choices provided     Problem: Fall Injury Risk  Goal: Absence of Fall and Fall-Related Injury  Outcome: Progressing  Intervention: Promote Injury-Free Environment  Flowsheets (Taken 5/13/2024 1325)  Safety Promotion/Fall Prevention:   assistive device/personal item within reach   in recliner, wheels locked   instructed to call staff for mobility   nonskid shoes/socks when out of bed

## 2024-05-13 NOTE — NURSING
Mepilex removed from L wrist. Site cleansed with sterile NS, patted dry. Site with dry intact scan. Steri-strips applied to site and left CORTNEY.

## 2024-05-14 PROCEDURE — 94640 AIRWAY INHALATION TREATMENT: CPT

## 2024-05-14 PROCEDURE — 97110 THERAPEUTIC EXERCISES: CPT

## 2024-05-14 PROCEDURE — 27000221 HC OXYGEN, UP TO 24 HOURS

## 2024-05-14 PROCEDURE — 94761 N-INVAS EAR/PLS OXIMETRY MLT: CPT

## 2024-05-14 PROCEDURE — 11000004 HC SNF PRIVATE

## 2024-05-14 PROCEDURE — 99900035 HC TECH TIME PER 15 MIN (STAT)

## 2024-05-14 PROCEDURE — 63600175 PHARM REV CODE 636 W HCPCS: Performed by: FAMILY MEDICINE

## 2024-05-14 PROCEDURE — 97116 GAIT TRAINING THERAPY: CPT | Mod: CQ

## 2024-05-14 PROCEDURE — 25000242 PHARM REV CODE 250 ALT 637 W/ HCPCS: Performed by: FAMILY MEDICINE

## 2024-05-14 PROCEDURE — 25000003 PHARM REV CODE 250: Performed by: FAMILY MEDICINE

## 2024-05-14 PROCEDURE — 97110 THERAPEUTIC EXERCISES: CPT | Mod: CQ

## 2024-05-14 PROCEDURE — 25000003 PHARM REV CODE 250: Performed by: EMERGENCY MEDICINE

## 2024-05-14 RX ORDER — SODIUM CHLORIDE 9 MG/ML
INJECTION, SOLUTION INTRAVENOUS
Status: DISCONTINUED | OUTPATIENT
Start: 2024-05-14 | End: 2024-05-20

## 2024-05-14 RX ORDER — KETOROLAC TROMETHAMINE 30 MG/ML
60 INJECTION, SOLUTION INTRAMUSCULAR; INTRAVENOUS ONCE
Status: COMPLETED | OUTPATIENT
Start: 2024-05-14 | End: 2024-05-14

## 2024-05-14 RX ADMIN — MEROPENEM 500 MG: 500 INJECTION, POWDER, FOR SOLUTION INTRAVENOUS at 06:05

## 2024-05-14 RX ADMIN — LOSARTAN POTASSIUM 50 MG: 25 TABLET, FILM COATED ORAL at 08:05

## 2024-05-14 RX ADMIN — MEROPENEM 500 MG: 500 INJECTION, POWDER, FOR SOLUTION INTRAVENOUS at 12:05

## 2024-05-14 RX ADMIN — ALBUTEROL SULFATE 2.5 MG: 2.5 SOLUTION RESPIRATORY (INHALATION) at 07:05

## 2024-05-14 RX ADMIN — BUDESONIDE INHALATION 0.5 MG: 0.5 SUSPENSION RESPIRATORY (INHALATION) at 07:05

## 2024-05-14 RX ADMIN — APIXABAN 5 MG: 2.5 TABLET, FILM COATED ORAL at 08:05

## 2024-05-14 RX ADMIN — ALBUTEROL SULFATE 2.5 MG: 2.5 SOLUTION RESPIRATORY (INHALATION) at 01:05

## 2024-05-14 RX ADMIN — KETOROLAC TROMETHAMINE 60 MG: 30 INJECTION, SOLUTION INTRAMUSCULAR at 08:05

## 2024-05-14 RX ADMIN — MEROPENEM 500 MG: 500 INJECTION, POWDER, FOR SOLUTION INTRAVENOUS at 08:05

## 2024-05-14 RX ADMIN — DOCUSATE SODIUM 100 MG: 100 CAPSULE, LIQUID FILLED ORAL at 08:05

## 2024-05-14 RX ADMIN — PAROXETINE HYDROCHLORIDE 20 MG: 10 TABLET, FILM COATED ORAL at 08:05

## 2024-05-14 RX ADMIN — ATORVASTATIN CALCIUM 10 MG: 10 TABLET, FILM COATED ORAL at 08:05

## 2024-05-14 RX ADMIN — SENNOSIDES AND DOCUSATE SODIUM 1 TABLET: 8.6; 5 TABLET ORAL at 08:05

## 2024-05-14 NOTE — PT/OT/SLP PROGRESS
Physical Therapy Treatment    Patient Name:  Verito Weston   MRN:  [id number]    Recommendations:     Discharge Recommendations: Low Intensity Therapy  Discharge Equipment Recommendations: none  Barriers to discharge: Decreased caregiver support    Assessment:     Verito Weston is a 89 y.o. female admitted with a medical diagnosis of Muscular weakness.  She presents with the following impairments/functional limitations: weakness, impaired endurance, impaired self care skills, impaired functional mobility, impaired balance, decreased upper extremity function, decreased lower extremity function, decreased safety awareness .  Patient's impairments have resulted in decrease safety and idependence with functional mobility and ADLs resulting in decreased ability to return home at this time. Pt progressed through tx while displaying improved strength and range of motion throughout tx.  Patient reports muscle fatigue at end of physical therapy treatment session.  No adverse effects noted or reported.       Rehab Prognosis: Good; patient would benefit from acute skilled PT services to address these deficits and reach maximum level of function.    Recent Surgery: * No surgery found *      Plan:     During this hospitalization, patient to be seen 5 x/week to address the identified rehab impairments via gait training, therapeutic activities, therapeutic exercises and progress toward the following goals:    Plan of Care Expires:  05/28/24    Subjective     Chief Complaint: weakness and decreased mobility   Patient/Family Comments/goals:  improve safety and independence with mobility for safe return home.   Pain/Comfort:         Objective:     Communicated with OT and skilled nursing prior to session.  Patient found up in chair with oxygen in rehab gym following occupational therapy treatment session.       General Precautions: Standard, fall  Orthopedic Precautions: N/A  Braces: N/A  Respiratory Status: Nasal cannula, flow 2  "L/min     Functional Mobility:  Transfers:     Sit to Stand:  moderate assistance with rolling walker  Gait: Approx. 100' with RW and mod assist x 1 on level indoor surface       AM-PAC 6 CLICK MOBILITY  Turning over in bed (including adjusting bedclothes, sheets and blankets)?: 3  Sitting down on and standing up from a chair with arms (e.g., wheelchair, bedside commode, etc.): 3  Moving from lying on back to sitting on the side of the bed?: 2  Moving to and from a bed to a chair (including a wheelchair)?: 3  Need to walk in hospital room?: 3  Climbing 3-5 steps with a railing?: 1 (Not attempted)  Basic Mobility Total Score: 15       Treatment & Education:  Therapeutic exercises to bilateral LE's    Ther-Ex Reps       Ankle pumps 30 x    Quad sets 30 x 3"   Horizontal Hip Abduction/Hip ADD 3 x 10 each   Hip ADD 2 x 10   Heelslides    LAQ's 2 x 10   Hamstring curls 2 x 10 yellow TB   Seated Marching 2 x 10    Hip ABD 2 x 10 yellow TB   Quad sets                  Patient left up in chair with all lines intact..    GOALS:   Multidisciplinary Problems       Physical Therapy Goals          Problem: Physical Therapy    Goal Priority Disciplines Outcome Goal Variances Interventions   Physical Therapy Goal     PT, PT/OT      Description: Short Term Goals  1. Patient will complete 30 reps of B LE exercises with correct form.   2. Patient will complete sit<>stand transfers with SBA.  3. Patient will ambulate 100 feet with RW on level surfaces with CGA.     Long Term Goals   1. Patient will ambulate 150 feet with RW on level and unlevel surfaces with SBA.  2. Patient will complete all functional transfers with MOD I.                        Time Tracking:     PT Received On: 05/14/24  PT Start Time: 1515     PT Stop Time: 1545  PT Total Time (min): 30 min     Billable Minutes: Gait Training 10 and Therapeutic Exercise 20    Treatment Type: Treatment  PT/PTA: PTA     Number of PTA visits since last PT visit: 4     Continue Plan " of Care per PT order to progress patient toward rehab goals as tolerated by patient.   KATRINA Jolley   05/14/2024

## 2024-05-14 NOTE — CONSULTS
[facility]  Adult Nutrition  Consult Note         Reason for Assessment  Reason For Assessment: RD follow-up SWB admit  Nutrition Risk Screen: no indicators present    Assessment and Plan  5/14/2024 RD Follow up: Patient continues on a regular diet. PO intake good with % per flowsheets. Continue diet as tolerated. RD Following.     Consult received and appreciated. Patient admitted 5/7 with a dx of muscular weakness, UTI, and COPD. Patient is ordered a cardiac on admission. Recommend to change to regular with MD agreement. PO intake good since admit with 100% documented. No issues noted with diet tolerance at this time.     Patient is 85.8 kg with a BMI of 28.76 which is WNL per geriatric standards. Recommend liberalize diet to regular. Encourage po intakes. RD Following.           Learning Needs/Social Determinants of Health    Learning Assessment       05/07/2024 1730 [facility] (5/7/2024 - Present)   Created by Reyna Danielle RN - RN (Nurse) Status: Complete                 PRIMARY LEARNER     Primary Learner Name:  Jovanna Weston TS - 05/07/2024 1730    Relationship:  Patient TS - 05/07/2024 1730    Does the primary learner have any barriers to learning?:  Visual TS - 05/07/2024 1730    What is the preferred language of the primary learner?:  English TS - 05/07/2024 1730    Is an  required?:  No TS - 05/07/2024 1730    How does the primary learner prefer to learn new concepts?:  Listening, Demonstration TS - 05/07/2024 1730    How often do you need to have someone help you read instructions, pamphlets, or written material from your doctor or pharmacy?:  Never TS - 05/07/2024 1730        CO-LEARNER #1     No question answered        CO-LEARNER #2     No question answered        SPECIAL TOPICS     No question answered        ANSWERED BY:     -:  Patient TS - 05/07/2024 1730        Comments         Edit History       Shashi  Reyna RN - RN (Nurse)   05/07/2024 1730                           Social Determinants of Health     Tobacco Use: Low Risk  (5/8/2024)    Patient History     Smoking Tobacco Use: Never     Smokeless Tobacco Use: Never     Passive Exposure: Not on file   Alcohol Use: Not At Risk (5/7/2024)    AUDIT-C     Frequency of Alcohol Consumption: Never     Average Number of Drinks: Patient does not drink     Frequency of Binge Drinking: Never   Financial Resource Strain: Low Risk  (5/7/2024)    Overall Financial Resource Strain (CARDIA)     Difficulty of Paying Living Expenses: Not hard at all   Food Insecurity: No Food Insecurity (5/7/2024)    Hunger Vital Sign     Worried About Running Out of Food in the Last Year: Never true     Ran Out of Food in the Last Year: Never true   Transportation Needs: Unknown (11/1/2022)    PRAPARE - Transportation     Lack of Transportation (Medical): No     Lack of Transportation (Non-Medical): Not on file   Physical Activity: Inactive (5/7/2024)    Exercise Vital Sign     Days of Exercise per Week: 0 days     Minutes of Exercise per Session: 0 min   Stress: No Stress Concern Present (5/7/2024)    [employer] - Occupational Stress Questionnaire     Feeling of Stress : Only a little   Housing Stability: Low Risk  (5/7/2024)    Housing Stability Vital Sign     Unable to Pay for Housing in the Last Year: No     Homeless in the Last Year: No   Depression: Not on file   Utilities: Not At Risk (5/7/2024)    [facility] Utilities     Threatened with loss of utilities: No   Health Literacy: Adequate Health Literacy (5/7/2024)     Health Literacy     Frequency of need for help with medical instructions: Never   Social Isolation: Not on file (5/7/2024)          Malnutrition  Is Patient Malnourished: No    Nutrition Diagnosis  No Nutritional Diagnosis at this time   Recent Labs   Lab 05/13/24  0425   GLU 91     Comments on Glucose: elevated    Nutrition Prescription /  Recommendations  Recommendation/Intervention: Recommend to liberalize diet to regular. encourage po intakes  Goals: po intake % with weight maintenance during admission  Nutrition Goal Status: goal met  Current Diet Order: Regular  Nutrition Order Comments: diet changed from cardiac  Chewing or Swallowing Difficulty?: No Chewing or swallowing difficulty  Recommended Diet: Regular  Recommended Oral Supplement: No Oral Supplements  Is Nutrition Support Recommended: [facility] Nutrition Support: No  Is Nutrition Education Recommended: No    Monitor and Evaluation  % current Intake: P.O. intake of 75 - 100 %  % intake to meet estimated needs: 75 - 100 %  Food and Nutrient Intake: energy intake, food and beverage intake  Food and Nutrient Adminstration: diet order  Anthropometric Measurements: height/length, weight, weight change, body mass index  Biochemical Data, Medical Tests and Procedures: electrolyte and renal panel, gastrointestinal profile, glucose/endocrine profile, inflammatory profile, lipid profile  Energy Calories Required: meeting needs  Protein Required: meeting needs  Fluid Required: meeting needs  Tolerance: tolerating    Current Medical Diagnosis and Past Medical History     Past Medical History:   Diagnosis Date    Afib     Asthma     Cancer     right breast    COPD (chronic obstructive pulmonary disease)     2L NC @ home    hx DVT in BLE     Hypercholesterolemia     Hypertension     Mass of lower outer quadrant of right breast 03/12/2024    Pacemaker 03/01/2022    Dr. Ramirez @ [facility]    Unspecified chronic bronchitis        Nutrition/Diet History  Spiritual, Cultural Beliefs, Confucianism Practices, Values that Affect Care: no  Food Allergies: NKFA  Factors Affecting Nutritional Intake: None identified at this time    Lab/Procedures/Meds  Recent Labs   Lab 05/13/24  0425      K 4.1   BUN 14   CREATININE 0.47*   CALCIUM 8.7        Last A1c: No results found for:  ""HGBA1C"  Lab Results   Component Value Date    RBC 3.09 (L) 05/13/2024    HGB 9.0 (L) 05/13/2024    HCT 28.7 (L) 05/13/2024    MCV 92.9 05/13/2024    MCH 29.1 05/13/2024    MCHC 31.4 (L) 05/13/2024     Pertinent Labs Reviewed: reviewed  Pertinent Labs Comments: Na 134 BUN 21  Pertinent Medications Reviewed: reviewed  Scheduled Meds:   albuterol sulfate  2.5 mg Nebulization Q6H WAKE    apixaban  5 mg Oral BID    atorvastatin  10 mg Oral QHS    budesonide  0.5 mg Nebulization Q12H    docusate sodium  100 mg Oral BID    losartan  50 mg Oral Daily    meropenem IV (PEDS and ADULTS)  500 mg Intravenous Q8H    paroxetine  20 mg Oral Daily    polyethylene glycol  17 g Oral Daily    senna-docusate 8.6-50 mg  1 tablet Oral BID     Continuous Infusions:  PRN Meds:.  Current Facility-Administered Medications:     sodium chloride 0.9%, , Intravenous, PRN    acetaminophen, 650 mg, Oral, Q6H PRN    albuterol-ipratropium, 3 mL, Nebulization, QID PRN    calcium carbonate, 500 mg, Oral, BID PRN    lactulose, 20 g, Oral, Q8H PRN    melatonin, 6 mg, Oral, Nightly PRN    Anthropometrics  Temp: 97.8 °F (36.6 °C)  Height Method: Stated  Height: 5' 8" (172.7 cm)  Height (inches): 68 in  Weight Method: Bed Scale  Weight: 85.8 kg (189 lb 2.5 oz)  Weight (lb): 189.16 lb  Ideal Body Weight (IBW), Female: 140 lb  % Ideal Body Weight, Female (lb): 135.11 %  BMI (Calculated): 28.8       Estimated/Assessed Needs      Temp: 97.8 °F (36.6 °C)Oral  Weight Used For Calorie Calculations: 85.8 kg (189 lb 2.5 oz)   Energy Need Method: Kcal/kg Energy Calorie Requirements (kcal): 5118-7211  Weight Used For Protein Calculations: 85.8 kg (189 lb 2.5 oz)  Protein Requirements: 69-86  Estimated Fluid Requirement Method: RDA Method    RDA Method (mL): 2145       Nutrition by Nursing  Diet/Nutrition Received: regular  Intake (%): 75%        Last Bowel Movement: 05/14/24                Nutrition Follow-Up  RD Follow-up?: Yes      Nutrition Discharge Planning: " home with family/HH; recommend liberalized diet as tolerated        Available via Secure Chat

## 2024-05-14 NOTE — PLAN OF CARE
Problem: Breathing Pattern Ineffective  Goal: Effective Breathing Pattern  5/13/2024 1905 by Lianna Vázquez, RRT  Outcome: Progressing  5/13/2024 0715 by Lianna Vázquez, RRT  Outcome: Progressing     Problem: Gas Exchange Impaired  Goal: Optimal Gas Exchange  5/13/2024 1905 by Lianna Vázquez, RRT  Outcome: Progressing  5/13/2024 0715 by Lianna Vázquez, RRT  Outcome: Progressing

## 2024-05-14 NOTE — PLAN OF CARE
Problem: Adult Inpatient Plan of Care  Goal: Plan of Care Review  Outcome: Progressing  Goal: Patient-Specific Goal (Individualized)  Outcome: Progressing     Problem: Fall Injury Risk  Goal: Absence of Fall and Fall-Related Injury  Outcome: Progressing  Intervention: Identify and Manage Contributors  Flowsheets (Taken 5/13/2024 2143)  Self-Care Promotion:   independence encouraged   BADL personal objects within reach   BADL personal routines maintained   adaptive equipment use encouraged  Medication Review/Management: medications reviewed  Intervention: Promote Injury-Free Environment  Flowsheets (Taken 5/13/2024 2143)  Safety Promotion/Fall Prevention:   assistive device/personal item within reach   chair alarm set   diversional activities provided   in recliner, wheels locked   medications reviewed   muscle strengthening facilitated   nonskid shoes/socks when out of bed   room near unit station   side rails raised x 3   instructed to call staff for mobility     Problem: Skin Injury Risk Increased  Goal: Skin Health and Integrity  Outcome: Progressing     Problem: Wound  Goal: Optimal Coping  Outcome: Progressing  Goal: Optimal Functional Ability  Outcome: Progressing  Goal: Absence of Infection Signs and Symptoms  Outcome: Progressing  Goal: Skin Health and Integrity  Outcome: Progressing  Goal: Optimal Wound Healing  Outcome: Progressing

## 2024-05-14 NOTE — PT/OT/SLP PROGRESS
Occupational Therapy   Treatment    Name: Verito Weston  MRN: [id number]  Admitting Diagnosis:  Muscular weakness       Recommendations:     Discharge Recommendations:    Discharge Equipment Recommendations:  none  Barriers to discharge:       Assessment:     Verito Weston is a 89 y.o. female with a medical diagnosis of Muscular weakness.   Performance deficits affecting function are weakness, impaired endurance, impaired self care skills, impaired functional mobility, impaired balance, decreased lower extremity function, decreased safety awareness.     Rehab Prognosis:  Good; patient would benefit from acute skilled OT services to address these deficits and reach maximum level of function.       Plan:     Patient to be seen 5 x/week to address the above listed problems via self-care/home management, therapeutic exercises  Plan of Care Expires:    Plan of Care Reviewed with: patient    Subjective     Chief Complaint: Shoulder pain  Patient/Family Comments/goals: Go home  Pain/Comfort:  Pain Rating 1: 0/10    Objective:     Communicated with: RN prior to session.  Patient found up in chair with   upon OT entry to room.    General Precautions: Standard, fall    Orthopedic Precautions:   Braces:    Respiratory Status: Nasal cannula, flow 2 L/min     Occupational Performance:     Bed Mobility:    Patient was up in chair already    Functional Mobility/Transfers:  Patient completed Sit <> Stand Transfer with minimum assistance  with  rolling walker   Functional Mobility: N/A    Activities of Daily Living:  Feeding S/u   Grooming S/u   Bathing Mod a   UB dsg S/u   LB dsg Mod a   Toileting Min a   Toilet t/f Min a           AMPAC 6 Click ADL: 20    Treatment & Education:  Pt educated on OT role/POC.   Importance of OOB activity with staff assistance.  Importance of sitting up in the chair throughout the day as tolerated, especially for meals   Safety during functional t/f and mobility  Importance of assisting with  self-care activities     Patient completed the following for increased strength to increase I with ADLs:  2# dowel- chest press, bicep curls x 30, yellow theraflex x 40 both ways, yellow theraband- scapular retraction x 40;   Ube 8 min      Patient left up in chair with call button in reach    GOALS:   Multidisciplinary Problems       Occupational Therapy Goals          Problem: Occupational Therapy    Goal Priority Disciplines Outcome Interventions   Occupational Therapy Goal     OT, PT/OT Progressing    Description: Description: Grooming Status:   Short Term Goal: Pt will perform grooming with s/u sitting EOB.   Long Term Goal: Pt will perform grooming/oral hygiene standing at sink with Mod I      LE dressing Status:   Short Term Goal: Pt will perform LE dressing with min a.   Long Term Goal: Pt will perform LE dressing with s/u.    Toileting Status:   Short Term Goal: Pt will perform toilet hygiene on BSC with min a.  Long Term Goal: Pt will perform toilet hygiene on toilet with no AE with s/u.    Commode Transfer:   Short Term Goal: Pt will perform BSC t/f with s/u.  Long Term Goal:  Pt will perform toilet t/f in bathroom with mod I.     Bathing Status:   Long Term Goal: Pt will perform sponge bath with s/u with no unsafe fatigue.     Strength Status:   Long Term Goal: Pt to perform BUE strengthening with weights and/or body weight to increase ADL independence and safety    Endurance Status:   Short Term Goal:pt to perform 15 min OT treatment with 5 or greater rest breaks  Long Term Goal: pt to perform 30 min OT treat with 3 or less rest breaks                       Time Tracking:     OT Date of Treatment: 05/14/24  OT Start Time: 1435  OT Stop Time: 1512  OT Total Time (min): 37 min    Billable Minutes:Therapeutic Exercise 37 min  Leann Rowley OTR/L      5/14/2024

## 2024-05-14 NOTE — PLAN OF CARE
Problem: Adult Inpatient Plan of Care  Goal: Plan of Care Review  Outcome: Progressing  Flowsheets (Taken 5/14/2024 1623)  Plan of Care Reviewed With: patient  Goal: Readiness for Transition of Care  Outcome: Progressing     Problem: Fall Injury Risk  Goal: Absence of Fall and Fall-Related Injury  Outcome: Progressing  Intervention: Promote Injury-Free Environment  Flowsheets (Taken 5/14/2024 162)  Safety Promotion/Fall Prevention:   assistive device/personal item within reach   muscle strengthening facilitated   medications reviewed   instructed to call staff for mobility   in recliner, wheels locked   nonskid shoes/socks when out of bed

## 2024-05-15 PROCEDURE — 63600175 PHARM REV CODE 636 W HCPCS: Performed by: FAMILY MEDICINE

## 2024-05-15 PROCEDURE — 94640 AIRWAY INHALATION TREATMENT: CPT

## 2024-05-15 PROCEDURE — 27000221 HC OXYGEN, UP TO 24 HOURS

## 2024-05-15 PROCEDURE — 25000003 PHARM REV CODE 250: Performed by: EMERGENCY MEDICINE

## 2024-05-15 PROCEDURE — 94761 N-INVAS EAR/PLS OXIMETRY MLT: CPT

## 2024-05-15 PROCEDURE — 97110 THERAPEUTIC EXERCISES: CPT

## 2024-05-15 PROCEDURE — 99900035 HC TECH TIME PER 15 MIN (STAT)

## 2024-05-15 PROCEDURE — 97110 THERAPEUTIC EXERCISES: CPT | Mod: CQ

## 2024-05-15 PROCEDURE — 25000003 PHARM REV CODE 250: Performed by: FAMILY MEDICINE

## 2024-05-15 PROCEDURE — 11000004 HC SNF PRIVATE

## 2024-05-15 PROCEDURE — 25000242 PHARM REV CODE 250 ALT 637 W/ HCPCS: Performed by: FAMILY MEDICINE

## 2024-05-15 PROCEDURE — 97116 GAIT TRAINING THERAPY: CPT | Mod: CQ

## 2024-05-15 RX ADMIN — POLYETHYLENE GLYCOL 3350 17 G: 17 POWDER, FOR SOLUTION ORAL at 08:05

## 2024-05-15 RX ADMIN — APIXABAN 5 MG: 2.5 TABLET, FILM COATED ORAL at 08:05

## 2024-05-15 RX ADMIN — ALBUTEROL SULFATE 2.5 MG: 2.5 SOLUTION RESPIRATORY (INHALATION) at 07:05

## 2024-05-15 RX ADMIN — DOCUSATE SODIUM 100 MG: 100 CAPSULE, LIQUID FILLED ORAL at 08:05

## 2024-05-15 RX ADMIN — ATORVASTATIN CALCIUM 10 MG: 10 TABLET, FILM COATED ORAL at 08:05

## 2024-05-15 RX ADMIN — BUDESONIDE INHALATION 0.5 MG: 0.5 SUSPENSION RESPIRATORY (INHALATION) at 07:05

## 2024-05-15 RX ADMIN — LOSARTAN POTASSIUM 50 MG: 25 TABLET, FILM COATED ORAL at 08:05

## 2024-05-15 RX ADMIN — SODIUM CHLORIDE: 9 INJECTION, SOLUTION INTRAVENOUS at 08:05

## 2024-05-15 RX ADMIN — SENNOSIDES AND DOCUSATE SODIUM 1 TABLET: 8.6; 5 TABLET ORAL at 08:05

## 2024-05-15 RX ADMIN — PAROXETINE HYDROCHLORIDE 20 MG: 10 TABLET, FILM COATED ORAL at 08:05

## 2024-05-15 RX ADMIN — MEROPENEM 500 MG: 500 INJECTION, POWDER, FOR SOLUTION INTRAVENOUS at 01:05

## 2024-05-15 RX ADMIN — SODIUM CHLORIDE: 9 INJECTION, SOLUTION INTRAVENOUS at 04:05

## 2024-05-15 RX ADMIN — MEROPENEM 500 MG: 500 INJECTION, POWDER, FOR SOLUTION INTRAVENOUS at 08:05

## 2024-05-15 RX ADMIN — ALBUTEROL SULFATE 2.5 MG: 2.5 SOLUTION RESPIRATORY (INHALATION) at 01:05

## 2024-05-15 RX ADMIN — MEROPENEM 500 MG: 500 INJECTION, POWDER, FOR SOLUTION INTRAVENOUS at 04:05

## 2024-05-15 NOTE — PLAN OF CARE
Problem: Adult Inpatient Plan of Care  Goal: Plan of Care Review  Outcome: Progressing  Goal: Patient-Specific Goal (Individualized)  Outcome: Progressing     Problem: Fall Injury Risk  Goal: Absence of Fall and Fall-Related Injury  Outcome: Progressing  Intervention: Identify and Manage Contributors  Flowsheets (Taken 5/14/2024 2136)  Self-Care Promotion:   independence encouraged   BADL personal objects within reach   BADL personal routines maintained   adaptive equipment use encouraged  Medication Review/Management: medications reviewed  Intervention: Promote Injury-Free Environment  Flowsheets (Taken 5/14/2024 2136)  Safety Promotion/Fall Prevention:   assistive device/personal item within reach   bed alarm set   diversional activities provided   medications reviewed   nonskid shoes/socks when out of bed   side rails raised x 3   instructed to call staff for mobility     Problem: Skin Injury Risk Increased  Goal: Skin Health and Integrity  Outcome: Progressing     Problem: Wound  Goal: Optimal Coping  Outcome: Progressing  Goal: Absence of Infection Signs and Symptoms  Outcome: Progressing  Goal: Improved Oral Intake  Outcome: Progressing

## 2024-05-15 NOTE — PT/OT/SLP PROGRESS
Occupational Therapy   Treatment    Name: Verito Weston  MRN: [id number]  Admitting Diagnosis:  Muscular weakness       Recommendations:     Discharge Recommendations:    Discharge Equipment Recommendations:  none  Barriers to discharge:       Assessment:     Verito Weston is a 89 y.o. female with a medical diagnosis of Muscular weakness.   Performance deficits affecting function are impaired endurance, weakness, impaired self care skills, impaired functional mobility, impaired balance, decreased lower extremity function, decreased safety awareness.     Rehab Prognosis:  Good; patient would benefit from acute skilled OT services to address these deficits and reach maximum level of function.       Plan:     Patient to be seen 5 x/week to address the above listed problems via therapeutic exercises, therapeutic activities, self-care/home management  Plan of Care Expires:    Plan of Care Reviewed with: patient    Subjective     Chief Complaint: Shoulder pain  Patient/Family Comments/goals: Go home  Pain/Comfort:  Pain Rating 1: 0/10    Objective:     Communicated with: RN prior to session.  Patient found up in chair with oxygen upon OT entry to room.    General Precautions: Standard, fall    Orthopedic Precautions:   Braces:    Respiratory Status: Nasal cannula, flow 2 L/min     Occupational Performance:     Bed Mobility:    Patient was up in chair already    Functional Mobility/Transfers:  Patient completed Sit <> Stand Transfer with minimum assistance  with  rolling walker   Functional Mobility: N/A    Activities of Daily Living:  Feeding S/u   Grooming S/u   Bathing Mod a   UB dsg S/u   LB dsg Mod a   Toileting Min a   Toilet t/f Min a           AMPAC 6 Click ADL: 20    Treatment & Education:  Pt educated on OT role/POC.   Importance of OOB activity with staff assistance.  Importance of sitting up in the chair throughout the day as tolerated, especially for meals   Safety during functional t/f and  mobility  Importance of assisting with self-care activities     Patient completed the following for increased strength to increase I with ADLs:  2# dowel- chest press, bicep curls x 30, yellow theraflex x 40 both ways, yellow theraband- scapular retraction x 40; UBE 8 min        Patient left up in chair with call button in reach    GOALS:   Multidisciplinary Problems       Occupational Therapy Goals          Problem: Occupational Therapy    Goal Priority Disciplines Outcome Interventions   Occupational Therapy Goal     OT, PT/OT Progressing    Description: Description: Grooming Status:   Short Term Goal: Pt will perform grooming with s/u sitting EOB.   Long Term Goal: Pt will perform grooming/oral hygiene standing at sink with Mod I      LE dressing Status:   Short Term Goal: Pt will perform LE dressing with min a.   Long Term Goal: Pt will perform LE dressing with s/u.    Toileting Status:   Short Term Goal: Pt will perform toilet hygiene on BSC with min a.  Long Term Goal: Pt will perform toilet hygiene on toilet with no AE with s/u.    Commode Transfer:   Short Term Goal: Pt will perform BSC t/f with s/u.  Long Term Goal:  Pt will perform toilet t/f in bathroom with mod I.     Bathing Status:   Long Term Goal: Pt will perform sponge bath with s/u with no unsafe fatigue.     Strength Status:   Long Term Goal: Pt to perform BUE strengthening with weights and/or body weight to increase ADL independence and safety    Endurance Status:   Short Term Goal:pt to perform 15 min OT treatment with 5 or greater rest breaks  Long Term Goal: pt to perform 30 min OT treat with 3 or less rest breaks                       Time Tracking:     OT Date of Treatment: 05/15/24  OT Start Time: 1340  OT Stop Time: 1412  OT Total Time (min): 32 min    Billable Minutes:Therapeutic Exercise 32 min  Leann Rowley OTR/L      5/15/2024

## 2024-05-15 NOTE — PT/OT/SLP PROGRESS
"Physical Therapy Treatment    Patient Name:  Verito Weston   MRN:  [id number]    Recommendations:     Discharge Recommendations: Low Intensity Therapy  Discharge Equipment Recommendations: none  Barriers to discharge: Decreased caregiver support    Assessment:     Verito Weston is a 89 y.o. female admitted with a medical diagnosis of Muscular weakness.  She presents with the following impairments/functional limitations: weakness, impaired endurance, impaired self care skills, impaired functional mobility, gait instability, decreased upper extremity function, decreased lower extremity function .  Patient's impairments have resulted in decrease safety and idependence with functional mobility and ADLs resulting in decreased ability to return home at this time. Pt progressed through tx with good carryover and form.  Patient is deliberate with isaias and states "I can't walk all the way to my room today.  The last time I did that it was too much ".  No adverse effects noted during PT treatment session.       Rehab Prognosis: Good; patient would benefit from acute skilled PT services to address these deficits and reach maximum level of function.    Recent Surgery: * No surgery found *      Plan:     During this hospitalization, patient to be seen 5 x/week to address the identified rehab impairments via gait training, therapeutic activities, therapeutic exercises and progress toward the following goals:    Plan of Care Expires:  05/28/24    Subjective     Chief Complaint: weakness and decreased mobility   Patient/Family Comments/goals:  improve safety and independence with mobility for safe return home.   Pain/Comfort:  Pain Rating 1: 0/10      Objective:     Communicated with OT and skilled nursing prior to session.  Patient found up in chair with oxygen in rehab gym following occupational therapy treatment session.       General Precautions: Standard, fall  Orthopedic Precautions: N/A  Braces: N/A  Respiratory Status: " "Nasal cannula, flow 2 L/min     Functional Mobility:  Transfers:     Sit to Stand:  moderate assistance with rolling walker  Gait: Approx. 80' with RW and mod assist x 1 on level indoor surface       AM-PAC 6 CLICK MOBILITY  Turning over in bed (including adjusting bedclothes, sheets and blankets)?: 3  Sitting down on and standing up from a chair with arms (e.g., wheelchair, bedside commode, etc.): 3  Moving from lying on back to sitting on the side of the bed?: 2  Moving to and from a bed to a chair (including a wheelchair)?: 3  Need to walk in hospital room?: 3  Climbing 3-5 steps with a railing?: 1  Basic Mobility Total Score: 15       Treatment & Education:  Therapeutic exercises to bilateral LE's    Ther-Ex Reps       Ankle pumps 30 x    Quad sets 30 x 3"   Horizontal Hip Abduction/Hip ADD 3 x 10 each   Hip ADD 2 x 10   Heelslides 2 x 10    LAQ's 2 x 10   Hamstring curls 2 x 10 yellow TB   Seated Marching 2 x 10    Hip ABD 2 x 10 yellow TB   Quad sets                  Patient left up in chair with all lines intact..    GOALS:   Multidisciplinary Problems       Physical Therapy Goals          Problem: Physical Therapy    Goal Priority Disciplines Outcome Goal Variances Interventions   Physical Therapy Goal     PT, PT/OT      Description: Short Term Goals  1. Patient will complete 30 reps of B LE exercises with correct form.   2. Patient will complete sit<>stand transfers with SBA.  3. Patient will ambulate 100 feet with RW on level surfaces with CGA.     Long Term Goals   1. Patient will ambulate 150 feet with RW on level and unlevel surfaces with SBA.  2. Patient will complete all functional transfers with MOD I.                        Time Tracking:     PT Received On: 05/15/24  PT Start Time: 1415     PT Stop Time: 1450  PT Total Time (min): 35 min     Billable Minutes: Gait Training 8 and Therapeutic Exercise 25    Treatment Type: Treatment  PT/PTA: PTA     Number of PTA visits since last PT visit: 5 "     Continue Plan of Care per PT order to progress patient toward rehab goals as tolerated by patient.   KATRINA Jolley   05/15/2024

## 2024-05-16 LAB
ANION GAP SERPL CALCULATED.3IONS-SCNC: 9 MMOL/L (ref 7–16)
BASOPHILS # BLD AUTO: 0.13 K/UL (ref 0–0.2)
BASOPHILS NFR BLD AUTO: 1.7 % (ref 0–1)
BUN SERPL-MCNC: 15 MG/DL (ref 7–18)
BUN/CREAT SERPL: 31 (ref 6–20)
CALCIUM SERPL-MCNC: 8.8 MG/DL (ref 8.5–10.1)
CHLORIDE SERPL-SCNC: 106 MMOL/L (ref 98–107)
CO2 SERPL-SCNC: 30 MMOL/L (ref 21–32)
CREAT SERPL-MCNC: 0.48 MG/DL (ref 0.55–1.02)
DIFFERENTIAL METHOD BLD: ABNORMAL
EGFR (NO RACE VARIABLE) (RUSH/TITUS): 88 ML/MIN/1.73M2
EOSINOPHIL # BLD AUTO: 0.63 K/UL (ref 0–0.5)
EOSINOPHIL NFR BLD AUTO: 8.2 % (ref 1–4)
ERYTHROCYTE [DISTWIDTH] IN BLOOD BY AUTOMATED COUNT: 15 % (ref 11.5–14.5)
GLUCOSE SERPL-MCNC: 88 MG/DL (ref 74–106)
HCT VFR BLD AUTO: 30.9 % (ref 38–47)
HGB BLD-MCNC: 9.8 G/DL (ref 12–16)
IMM GRANULOCYTES # BLD AUTO: 0.06 K/UL (ref 0–0.04)
IMM GRANULOCYTES NFR BLD: 0.8 % (ref 0–0.4)
LYMPHOCYTES # BLD AUTO: 2.07 K/UL (ref 1–4.8)
LYMPHOCYTES NFR BLD AUTO: 26.9 % (ref 27–41)
MCH RBC QN AUTO: 29.6 PG (ref 27–31)
MCHC RBC AUTO-ENTMCNC: 31.7 G/DL (ref 32–36)
MCV RBC AUTO: 93.4 FL (ref 80–96)
MONOCYTES # BLD AUTO: 0.7 K/UL (ref 0–0.8)
MONOCYTES NFR BLD AUTO: 9.1 % (ref 2–6)
MPC BLD CALC-MCNC: 9.7 FL (ref 9.4–12.4)
NEUTROPHILS # BLD AUTO: 4.1 K/UL (ref 1.8–7.7)
NEUTROPHILS NFR BLD AUTO: 53.3 % (ref 53–65)
NRBC # BLD AUTO: 0 X10E3/UL
NRBC, AUTO (.00): 0 %
PLATELET # BLD AUTO: 400 K/UL (ref 150–400)
POTASSIUM SERPL-SCNC: 4.3 MMOL/L (ref 3.5–5.1)
RBC # BLD AUTO: 3.31 M/UL (ref 4.2–5.4)
SODIUM SERPL-SCNC: 141 MMOL/L (ref 136–145)
WBC # BLD AUTO: 7.69 K/UL (ref 4.5–11)

## 2024-05-16 PROCEDURE — 25000242 PHARM REV CODE 250 ALT 637 W/ HCPCS: Performed by: FAMILY MEDICINE

## 2024-05-16 PROCEDURE — 80048 BASIC METABOLIC PNL TOTAL CA: CPT | Performed by: FAMILY MEDICINE

## 2024-05-16 PROCEDURE — 94640 AIRWAY INHALATION TREATMENT: CPT

## 2024-05-16 PROCEDURE — 25000003 PHARM REV CODE 250: Performed by: EMERGENCY MEDICINE

## 2024-05-16 PROCEDURE — 11000004 HC SNF PRIVATE

## 2024-05-16 PROCEDURE — 85025 COMPLETE CBC W/AUTO DIFF WBC: CPT | Performed by: FAMILY MEDICINE

## 2024-05-16 PROCEDURE — 97110 THERAPEUTIC EXERCISES: CPT

## 2024-05-16 PROCEDURE — 63600175 PHARM REV CODE 636 W HCPCS: Performed by: FAMILY MEDICINE

## 2024-05-16 PROCEDURE — 27000221 HC OXYGEN, UP TO 24 HOURS

## 2024-05-16 PROCEDURE — 36415 COLL VENOUS BLD VENIPUNCTURE: CPT | Performed by: FAMILY MEDICINE

## 2024-05-16 PROCEDURE — 25000003 PHARM REV CODE 250: Performed by: FAMILY MEDICINE

## 2024-05-16 PROCEDURE — 94761 N-INVAS EAR/PLS OXIMETRY MLT: CPT

## 2024-05-16 PROCEDURE — 99900035 HC TECH TIME PER 15 MIN (STAT)

## 2024-05-16 PROCEDURE — 97116 GAIT TRAINING THERAPY: CPT

## 2024-05-16 RX ADMIN — APIXABAN 5 MG: 2.5 TABLET, FILM COATED ORAL at 08:05

## 2024-05-16 RX ADMIN — MEROPENEM 500 MG: 500 INJECTION, POWDER, FOR SOLUTION INTRAVENOUS at 01:05

## 2024-05-16 RX ADMIN — ALBUTEROL SULFATE 2.5 MG: 2.5 SOLUTION RESPIRATORY (INHALATION) at 07:05

## 2024-05-16 RX ADMIN — BUDESONIDE INHALATION 0.5 MG: 0.5 SUSPENSION RESPIRATORY (INHALATION) at 09:05

## 2024-05-16 RX ADMIN — ALBUTEROL SULFATE 2.5 MG: 2.5 SOLUTION RESPIRATORY (INHALATION) at 03:05

## 2024-05-16 RX ADMIN — ATORVASTATIN CALCIUM 10 MG: 10 TABLET, FILM COATED ORAL at 08:05

## 2024-05-16 RX ADMIN — LOSARTAN POTASSIUM 50 MG: 25 TABLET, FILM COATED ORAL at 08:05

## 2024-05-16 RX ADMIN — BUDESONIDE INHALATION 0.5 MG: 0.5 SUSPENSION RESPIRATORY (INHALATION) at 07:05

## 2024-05-16 RX ADMIN — ALBUTEROL SULFATE 2.5 MG: 2.5 SOLUTION RESPIRATORY (INHALATION) at 09:05

## 2024-05-16 RX ADMIN — PAROXETINE HYDROCHLORIDE 20 MG: 10 TABLET, FILM COATED ORAL at 08:05

## 2024-05-16 NOTE — PT/OT/SLP PROGRESS
Occupational Therapy   Treatment    Name: Verito Weston  MRN: [id number]  Admitting Diagnosis:  Muscular weakness       Recommendations:     Discharge Recommendations:    Discharge Equipment Recommendations:  none  Barriers to discharge:       Assessment:     Verito Weston is a 89 y.o. female with a medical diagnosis of Muscular weakness.   Performance deficits affecting function are weakness, impaired endurance, impaired self care skills, impaired functional mobility, impaired balance, decreased lower extremity function, decreased safety awareness.     Rehab Prognosis:  Good; patient would benefit from acute skilled OT services to address these deficits and reach maximum level of function.       Plan:     Patient to be seen 5 x/week to address the above listed problems via therapeutic exercises, therapeutic activities, self-care/home management  Plan of Care Expires:    Plan of Care Reviewed with: patient    Subjective     Chief Complaint: Shoulder pain  Patient/Family Comments/goals: Go home  Pain/Comfort:  Pain Rating 1: 0/10    Objective:     Communicated with: RN prior to session.  Patient found up in chair with oxygen upon OT entry to room.    General Precautions: Standard, fall    Orthopedic Precautions:   Braces:    Respiratory Status: Nasal cannula, flow 2 L/min     Occupational Performance:     Bed Mobility:    Patient was up in chair already    Functional Mobility/Transfers:  Patient completed Sit <> Stand Transfer with minimum assistance  with  rolling walker   Functional Mobility: N/A    Activities of Daily Living:  Feeding S/u   Grooming S/u   Bathing Mod a   UB dsg S/u   LB dsg Mod a   Toileting Min a   Toilet t/f Min a           AMPAC 6 Click ADL: 20    Treatment & Education:  Pt educated on OT role/POC.   Importance of OOB activity with staff assistance.  Importance of sitting up in the chair throughout the day as tolerated, especially for meals   Safety during functional t/f and  mobility  Importance of assisting with self-care activities     Patient completed the following for increased strength to increase I with ADLs:  2# dowel- chest press, bicep curls x 30, yellow theraflex x 40 both ways, yellow theraband- scapular retraction x 40; UBE 8 min        Patient left up in chair with call button in reach    GOALS:   Multidisciplinary Problems       Occupational Therapy Goals          Problem: Occupational Therapy    Goal Priority Disciplines Outcome Interventions   Occupational Therapy Goal     OT, PT/OT Progressing    Description: Description: Grooming Status:   Short Term Goal: Pt will perform grooming with s/u sitting EOB.   Long Term Goal: Pt will perform grooming/oral hygiene standing at sink with Mod I      LE dressing Status:   Short Term Goal: Pt will perform LE dressing with min a.   Long Term Goal: Pt will perform LE dressing with s/u.    Toileting Status:   Short Term Goal: Pt will perform toilet hygiene on BSC with min a.  Long Term Goal: Pt will perform toilet hygiene on toilet with no AE with s/u.    Commode Transfer:   Short Term Goal: Pt will perform BSC t/f with s/u.  Long Term Goal:  Pt will perform toilet t/f in bathroom with mod I.     Bathing Status:   Long Term Goal: Pt will perform sponge bath with s/u with no unsafe fatigue.     Strength Status:   Long Term Goal: Pt to perform BUE strengthening with weights and/or body weight to increase ADL independence and safety    Endurance Status:   Short Term Goal:pt to perform 15 min OT treatment with 5 or greater rest breaks  Long Term Goal: pt to perform 30 min OT treat with 3 or less rest breaks                       Time Tracking:     OT Date of Treatment: 05/16/24  OT Start Time: 1346  OT Stop Time: 1422  OT Total Time (min): 36 min    Billable Minutes:Therapeutic Exercise 36 min  Leann Rowley OTR/L      5/16/2024

## 2024-05-16 NOTE — NURSING
Mid line removed per Dr. Singletary at this time. Cath intact. Pressure applied to site and pressure dressing applied. Tolerated well.

## 2024-05-16 NOTE — PLAN OF CARE
Problem: Adult Inpatient Plan of Care  Goal: Plan of Care Review  Outcome: Progressing  Flowsheets (Taken 5/15/2024 2007)  Plan of Care Reviewed With: patient  Goal: Patient-Specific Goal (Individualized)  Outcome: Progressing  Goal: Absence of Hospital-Acquired Illness or Injury  Outcome: Progressing  Intervention: Prevent Infection  Flowsheets (Taken 5/15/2024 2007)  Infection Prevention:   environmental surveillance performed   equipment surfaces disinfected   hand hygiene promoted   personal protective equipment utilized   rest/sleep promoted   single patient room provided  Goal: Optimal Comfort and Wellbeing  Outcome: Progressing  Intervention: Provide Person-Centered Care  Flowsheets (Taken 5/15/2024 2007)  Trust Relationship/Rapport:   care explained   choices provided   emotional support provided   empathic listening provided   questions answered   questions encouraged   reassurance provided   thoughts/feelings acknowledged  Goal: Readiness for Transition of Care  Outcome: Progressing  Intervention: Mutually Develop Transition Plan  Flowsheets (Taken 5/15/2024 2007)  Equipment Currently Used at Home: walker, rolling  Transportation Anticipated: family or friend will provide  Communicated TREASURE with patient/caregiver: Date not available/Unable to determine  Do you expect to return to your current living situation?: Yes  Do you have help at home or someone to help you manage your care at home?: Yes  Readmission within 30 days?: No  Do you currently have service(s) that help you manage your care at home?: No

## 2024-05-16 NOTE — PT/OT/SLP PROGRESS
Physical Therapy Treatment    Patient Name:  Verito Weston   MRN:  [id number]    Recommendations:     Discharge Recommendations: Low Intensity Therapy  Discharge Equipment Recommendations: none  Barriers to discharge: Decreased caregiver support    Assessment:     Verito Weston is a 89 y.o. female admitted with a medical diagnosis of Muscular weakness.  She presents with the following impairments/functional limitations: weakness, impaired endurance, impaired functional mobility, gait instability, impaired balance, decreased safety awareness, pain, decreased lower extremity function .  Patient's impairments have resulted in decrease safety and idependence with functional mobility and ADLs resulting in decreased ability to return home at this time. PT gave verbal and visual cues for proper form to increase effectiveness of exercise for improving muscular strength and endurance. Patient required breaks during exercise due to fatigue. PT stopped gait training due to patient fatigue. No adverse effects of exercise noted.       Rehab Prognosis: Good; patient would benefit from acute skilled PT services to address these deficits and reach maximum level of function.    Recent Surgery: * No surgery found *      Plan:     During this hospitalization, patient to be seen 5 x/week to address the identified rehab impairments via gait training, therapeutic activities, therapeutic exercises and progress toward the following goals:    Plan of Care Expires:  05/28/24    Subjective     Chief Complaint: weakness and decreased mobility   Patient/Family Comments/goals:  improve safety and independence with mobility for safe return home.   Pain/Comfort:  Pain Rating 1: 0/10      Objective:     Communicated with patient prior to initiation of treatment. She denies pain and is agreeable to treatment.     General Precautions: Standard, fall  Orthopedic Precautions: N/A  Braces: N/A  Respiratory Status: Nasal cannula, flow 2 L/min    "  Functional Mobility:  Transfers:     Sit to Stand:  moderate assistance with rolling walker  Gait: Approx. 90' with RW and min assist x 1 on level indoor surface       AM-PAC 6 CLICK MOBILITY  Turning over in bed (including adjusting bedclothes, sheets and blankets)?: 3  Sitting down on and standing up from a chair with arms (e.g., wheelchair, bedside commode, etc.): 3  Moving from lying on back to sitting on the side of the bed?: 2  Moving to and from a bed to a chair (including a wheelchair)?: 3  Need to walk in hospital room?: 3  Climbing 3-5 steps with a railing?: 1  Basic Mobility Total Score: 15       Treatment & Education:  Therapeutic exercises to bilateral LE's    Ther-Ex Reps       Ankle pumps 30 x    Quad sets 30 x 3"   Horizontal Hip Abduction/Hip ADD 3 x 10 each   Hip ADD 2 x 10   Heelslides 2 x 10    LAQ's 2 x 10   Hamstring curls 2 x 10 yellow TB   Seated Marching 2 x 10    Hip ABD 2 x 10 yellow TB                Patient left up in chair with call button in reach       GOALS:   Multidisciplinary Problems       Physical Therapy Goals          Problem: Physical Therapy    Goal Priority Disciplines Outcome Goal Variances Interventions   Physical Therapy Goal     PT, PT/OT      Description: Short Term Goals  1. Patient will complete 30 reps of B LE exercises with correct form.   2. Patient will complete sit<>stand transfers with SBA.  3. Patient will ambulate 100 feet with RW on level surfaces with CGA.     Long Term Goals   1. Patient will ambulate 150 feet with RW on level and unlevel surfaces with SBA.  2. Patient will complete all functional transfers with MOD I.                        Time Tracking:     PT Received On: 05/16/24  PT Start Time: 1020     PT Stop Time: 1048  PT Total Time (min): 28 min     Billable Minutes: Gait Training 8 and Therapeutic Exercise 20    Treatment Type: Treatment  PT/PTA: PT     Number of PTA visits since last PT visit: 0     Continue Plan of Care per PT order to " progress patient toward rehab goals as tolerated by patient.   John Gomes, PT, DPT     05/16/2024

## 2024-05-16 NOTE — PLAN OF CARE
[facility] - Medical Surgical Unit - Swing Bed   Interdisciplinary Team Meeting    Patient: Verito Weston   Today's Date: 5/16/2024   Estimated D/C Date: 5/27/2024        Physician: Catrachita Hyman MD Unit Director: Sera Ramirez RN   Pharmacy: Leah Mejía, PharmD Nursing: MUNA Georges   : Leesa Mar RN Physical/Occupational Therapy: Leann Rowley OT, SUDARSHAN Gomes PT   Speech Therapy: ST [facility] Respiratory: See respiratory notes   Dietary: See dietary notes   Other: RADHA Melendrez RD     Nursing  New Symptoms/Problems: none at this time      Urine: incontinent  Anderson: No   Bowel: continent  Last Bowel Movement: 05/16/24   Constipated: No  Diarrhea: No   Isolation: No  Cognition: WNL  Aspiration Precautions: No  Wound Care: No  Wound Location/Tx: n/a  Comment(s): n/a     Respiratory  O2 Device: Nasal Cannula  O2 Flow: 2l  SpO2: 98%  Neb Tx: Yes  Comment(s): n/a     Dietary  Nutrition: Regular  Comment(s): n/a    Speech Therapy  Speech/Swallowing: No current speech or swallowing issues  Comment(s): n/a    Physical Therapy  Gait/Assistive Device: Approx. 90' with RW and min assist x 1 on level indoor surface  ELOS: Plan to DC 5/27/2024    Transfers: Moderate Assistance  Bed Mobility: Activity did not occur Range of Motion/Restrictions: as tolerated  Comment(s): n/a      Occupational Therapy  Eating/Grooming: Supervision or Set-up Assistance Toileting: Moderate Assistance   Bathing: Moderate Assistance Dressing (Upper Body): Supervision or Set-up Assistance   Dressing (Lower Body): Moderate Assistance   Comment(s): n/a   Activity Therapy  Level of participation: Active participation  Comment(s): n/a    Pharmacy  Medication Changes: No  Labs Reviewed: Yes  New Lab Orders: No  Comment(s): lab q mon/thurs      Tx Plan/Recommendations reviewed with family and/or patient on 5/15/24.  Additional family Conference/Training: not at this time  D/C Plan/Recommendations:  Home with HH and Home with family  TREASURE: 5/27/2024  Comment(s): pt going home with [employer] BARDALES

## 2024-05-17 PROCEDURE — 11000004 HC SNF PRIVATE

## 2024-05-17 PROCEDURE — 25000242 PHARM REV CODE 250 ALT 637 W/ HCPCS: Performed by: FAMILY MEDICINE

## 2024-05-17 PROCEDURE — 97530 THERAPEUTIC ACTIVITIES: CPT | Mod: CQ

## 2024-05-17 PROCEDURE — 97116 GAIT TRAINING THERAPY: CPT | Mod: CQ

## 2024-05-17 PROCEDURE — 97110 THERAPEUTIC EXERCISES: CPT

## 2024-05-17 PROCEDURE — 25000003 PHARM REV CODE 250: Performed by: EMERGENCY MEDICINE

## 2024-05-17 PROCEDURE — 94761 N-INVAS EAR/PLS OXIMETRY MLT: CPT

## 2024-05-17 PROCEDURE — 99308 SBSQ NF CARE LOW MDM 20: CPT | Mod: ,,, | Performed by: FAMILY MEDICINE

## 2024-05-17 PROCEDURE — 25000003 PHARM REV CODE 250: Performed by: FAMILY MEDICINE

## 2024-05-17 PROCEDURE — 94640 AIRWAY INHALATION TREATMENT: CPT

## 2024-05-17 PROCEDURE — 27000221 HC OXYGEN, UP TO 24 HOURS

## 2024-05-17 RX ADMIN — ALBUTEROL SULFATE 2.5 MG: 2.5 SOLUTION RESPIRATORY (INHALATION) at 07:05

## 2024-05-17 RX ADMIN — APIXABAN 5 MG: 2.5 TABLET, FILM COATED ORAL at 08:05

## 2024-05-17 RX ADMIN — LOSARTAN POTASSIUM 50 MG: 25 TABLET, FILM COATED ORAL at 08:05

## 2024-05-17 RX ADMIN — SENNOSIDES AND DOCUSATE SODIUM 1 TABLET: 8.6; 5 TABLET ORAL at 08:05

## 2024-05-17 RX ADMIN — ATORVASTATIN CALCIUM 10 MG: 10 TABLET, FILM COATED ORAL at 08:05

## 2024-05-17 RX ADMIN — ALBUTEROL SULFATE 2.5 MG: 2.5 SOLUTION RESPIRATORY (INHALATION) at 02:05

## 2024-05-17 RX ADMIN — PAROXETINE HYDROCHLORIDE 20 MG: 10 TABLET, FILM COATED ORAL at 08:05

## 2024-05-17 RX ADMIN — BUDESONIDE INHALATION 0.5 MG: 0.5 SUSPENSION RESPIRATORY (INHALATION) at 07:05

## 2024-05-17 RX ADMIN — DOCUSATE SODIUM 100 MG: 100 CAPSULE, LIQUID FILLED ORAL at 08:05

## 2024-05-17 NOTE — PT/OT/SLP PROGRESS
Physical Therapy Treatment    Patient Name:  Verito Weston   MRN:  [id number]    Recommendations:     Discharge Recommendations: Low Intensity Therapy  Discharge Equipment Recommendations: none  Barriers to discharge: Decreased caregiver support    Assessment:     Verito Weston is a 89 y.o. female admitted with a medical diagnosis of Muscular weakness.  She presents with the following impairments/functional limitations: weakness, impaired endurance, impaired functional mobility, gait instability, impaired balance, decreased safety awareness, pain, decreased lower extremity function .  Patient's impairments have resulted in decrease safety and idependence with functional mobility and ADLs resulting in decreased ability to return home at this time.  Patient requires rest breaks between sets of Therapeutic activities.  Patient is able to increase distance during gait training without adverse effects noted. Patient overall demonstrates improved activity tolerance.     Rehab Prognosis: Good; patient would benefit from acute skilled PT services to address these deficits and reach maximum level of function.    Recent Surgery: * No surgery found *      Plan:     During this hospitalization, patient to be seen 5 x/week to address the identified rehab impairments via gait training, therapeutic activities, therapeutic exercises and progress toward the following goals:    Plan of Care Expires:  05/28/24    Subjective     Chief Complaint: weakness and decreased mobility   Patient/Family Comments/goals:  improve safety and independence with mobility for safe return home.   Pain/Comfort:         Objective:     Communicated with patient prior to initiation of treatment. She denies pain and is agreeable to treatment.     General Precautions: Standard, fall  Orthopedic Precautions: N/A  Braces: N/A  Respiratory Status: Nasal cannula, flow 2 L/min     Functional Mobility:  Transfers:     Sit to Stand:  moderate assistance with  "rolling walker  Gait: Approx. 110' with RW and CGA assist x 1 on level indoor surface       AM-PAC 6 CLICK MOBILITY  Turning over in bed (including adjusting bedclothes, sheets and blankets)?: 3  Sitting down on and standing up from a chair with arms (e.g., wheelchair, bedside commode, etc.): 3  Moving from lying on back to sitting on the side of the bed?: 2  Moving to and from a bed to a chair (including a wheelchair)?: 3  Need to walk in hospital room?: 3  Climbing 3-5 steps with a railing?: 1  Basic Mobility Total Score: 15       Treatment & Education:  Therapeutic exercises to bilateral LE's    Ther-Ex Reps       Ankle pumps    Quad sets 30 x 3"   Horizontal Hip Abduction/Hip ADD 3 x 10 each   Hip ADD 2 x 10   Heelslides    LAQ's    Hamstring curls 2 x 10 yellow tb    Seated Marching    Hip ABD 2 x 10 yellow TB                Therapuetic activities to improve static and dynamic functional mobility:  2 x 10 each: hip and knee flexion, hip abduction, mini squats; sit to stands; heel raises     Patient left up in chair with call button in reach       GOALS:   Multidisciplinary Problems       Physical Therapy Goals          Problem: Physical Therapy    Goal Priority Disciplines Outcome Goal Variances Interventions   Physical Therapy Goal     PT, PT/OT      Description: Short Term Goals  1. Patient will complete 30 reps of B LE exercises with correct form.   2. Patient will complete sit<>stand transfers with SBA.  3. Patient will ambulate 100 feet with RW on level surfaces with CGA.     Long Term Goals   1. Patient will ambulate 150 feet with RW on level and unlevel surfaces with SBA.  2. Patient will complete all functional transfers with MOD I.                        Time Tracking:     PT Received On: 05/17/24  PT Start Time: 1325     PT Stop Time: 1355  PT Total Time (min): 30 min     Billable Minutes: Gait Training 8 and Therapeutic Exercise 5  Therapeutic activities 17 minutes     Treatment Type: " Treatment  PT/PTA: PTA     Number of PTA visits since last PT visit: 1     Continue Plan of Care per PT order to progress patient toward rehab goals as tolerated by patient.   KATRINA Jolley     05/17/2024

## 2024-05-17 NOTE — PLAN OF CARE
Problem: Adult Inpatient Plan of Care  Goal: Plan of Care Review  Outcome: Progressing  Flowsheets (Taken 5/16/2024 1923)  Plan of Care Reviewed With: patient  Goal: Patient-Specific Goal (Individualized)  Outcome: Progressing  Goal: Absence of Hospital-Acquired Illness or Injury  Outcome: Progressing  Intervention: Prevent Infection  Flowsheets (Taken 5/16/2024 1923)  Infection Prevention:   environmental surveillance performed   equipment surfaces disinfected   hand hygiene promoted   personal protective equipment utilized   single patient room provided   rest/sleep promoted  Goal: Optimal Comfort and Wellbeing  Outcome: Progressing  Intervention: Provide Person-Centered Care  Flowsheets (Taken 5/16/2024 1923)  Trust Relationship/Rapport:   care explained   choices provided   emotional support provided   empathic listening provided   questions encouraged   questions answered   reassurance provided   thoughts/feelings acknowledged  Goal: Readiness for Transition of Care  Outcome: Progressing  Intervention: Mutually Develop Transition Plan  Flowsheets (Taken 5/16/2024 1923)  Equipment Currently Used at Home: walker, rolling  Transportation Anticipated: family or friend will provide  Communicated TREASURE with patient/caregiver: Date not available/Unable to determine  Do you expect to return to your current living situation?: Yes  Do you have help at home or someone to help you manage your care at home?: Yes  Readmission within 30 days?: No  Do you currently have service(s) that help you manage your care at home?: No

## 2024-05-17 NOTE — SUBJECTIVE & OBJECTIVE
Interval History: will continue present treatment.    Review of Systems  Objective:     Vital Signs (Most Recent):  Temp: 97.9 °F (36.6 °C) (05/17/24 0727)  Pulse: 79 (05/17/24 0727)  Resp: 18 (05/17/24 0727)  BP: (!) 121/59 (05/17/24 0727)  SpO2: 97 % (05/17/24 0727) Vital Signs (24h Range):  Temp:  [97.9 °F (36.6 °C)-98.1 °F (36.7 °C)] 97.9 °F (36.6 °C)  Pulse:  [68-80] 79  Resp:  [17-20] 18  SpO2:  [96 %-100 %] 97 %  BP: (117-121)/(59-71) 121/59     Weight: 85.8 kg (189 lb 2.5 oz)  Body mass index is 28.76 kg/m².    Intake/Output Summary (Last 24 hours) at 5/17/2024 0806  Last data filed at 5/16/2024 1140  Gross per 24 hour   Intake 240 ml   Output --   Net 240 ml         Physical Exam        Significant Labs: All pertinent labs within the past 24 hours have been reviewed.    Significant Imaging: I have reviewed all pertinent imaging results/findings within the past 24 hours.

## 2024-05-17 NOTE — PT/OT/SLP PROGRESS
Occupational Therapy   Treatment    Name: Verito Weston  MRN: [id number]  Admitting Diagnosis:  Muscular weakness       Recommendations:     Discharge Recommendations:    Discharge Equipment Recommendations:  none  Barriers to discharge:       Assessment:     Verito Weston is a 89 y.o. female with a medical diagnosis of Muscular weakness.   Performance deficits affecting function are weakness, impaired endurance, impaired self care skills, impaired functional mobility, impaired balance, decreased lower extremity function, decreased safety awareness.     Rehab Prognosis:  Good; patient would benefit from acute skilled OT services to address these deficits and reach maximum level of function.       Plan:     Patient to be seen 5 x/week to address the above listed problems via self-care/home management, therapeutic activities, therapeutic exercises  Plan of Care Expires:    Plan of Care Reviewed with: patient    Subjective     Chief Complaint: Shoulder pain  Patient/Family Comments/goals: Go home  Pain/Comfort:  Pain Rating 1: 0/10    Objective:     Communicated with: RN prior to session.  Patient found up in chair with oxygen upon OT entry to room.    General Precautions: Standard, fall    Orthopedic Precautions:   Braces:    Respiratory Status: Nasal cannula, flow 2 L/min     Occupational Performance:     Bed Mobility:    Patient was up in chair already    Functional Mobility/Transfers:  Patient completed Sit <> Stand Transfer with minimum assistance  with  rolling walker   Functional Mobility: N/A    Activities of Daily Living:  Feeding S/u   Grooming S/u   Bathing Mod a   UB dsg S/u   LB dsg Mod a   Toileting Min a   Toilet t/f Min a           AMPAC 6 Click ADL: 20    Treatment & Education:  Pt educated on OT role/POC.   Importance of OOB activity with staff assistance.  Importance of sitting up in the chair throughout the day as tolerated, especially for meals   Safety during functional t/f and  mobility  Importance of assisting with self-care activities     Patient completed the following for increased strength to increase I with ADLs:  2# dowel- chest press, bicep curls x 30, yellow theraflex x 40 both ways, yellow theraband- scapular retraction x 40; UBE 8 min        Patient left up in chair with call button in reach    GOALS:   Multidisciplinary Problems       Occupational Therapy Goals          Problem: Occupational Therapy    Goal Priority Disciplines Outcome Interventions   Occupational Therapy Goal     OT, PT/OT Progressing    Description: Description: Grooming Status:   Short Term Goal: Pt will perform grooming with s/u sitting EOB.   Long Term Goal: Pt will perform grooming/oral hygiene standing at sink with Mod I      LE dressing Status:   Short Term Goal: Pt will perform LE dressing with min a.   Long Term Goal: Pt will perform LE dressing with s/u.    Toileting Status:   Short Term Goal: Pt will perform toilet hygiene on BSC with min a.  Long Term Goal: Pt will perform toilet hygiene on toilet with no AE with s/u.    Commode Transfer:   Short Term Goal: Pt will perform BSC t/f with s/u.  Long Term Goal:  Pt will perform toilet t/f in bathroom with mod I.     Bathing Status:   Long Term Goal: Pt will perform sponge bath with s/u with no unsafe fatigue.     Strength Status:   Long Term Goal: Pt to perform BUE strengthening with weights and/or body weight to increase ADL independence and safety    Endurance Status:   Short Term Goal:pt to perform 15 min OT treatment with 5 or greater rest breaks  Long Term Goal: pt to perform 30 min OT treat with 3 or less rest breaks                       Time Tracking:     OT Date of Treatment: 05/17/24  OT Start Time: 1040  OT Stop Time: 1114  OT Total Time (min): 34 min    Billable Minutes:Therapeutic Exercise 34 min  Leann Rowley OTR/L      5/17/2024

## 2024-05-17 NOTE — PROGRESS NOTES
[facility]  Hospital Medicine  Progress Note    Patient Name: Verito Weston  MRN: [id number]  Patient Class: IP- Swing   Admission Date: 5/7/2024  Length of Stay: 10 days  Attending Physician: Catrachita Hyman,*  Primary Care Provider: Maricel Avila MD        Subjective:     Principal Problem:Muscular weakness        HPI:  This 89 yr. Old WF has been admitted to [facility] for Swing Bed Services. 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 She was transfused with 4 units prior to this admission. She has recently been diagnosed with breast cancer again. She has SSS with a pacemaker. She has chronic AF. She is also hypertensive. She is here for therapy. She does have a UTI and is presently on merrem.    Overview/Hospital Course:  5/10/24 - doing well this AM. Voices no complaint. Will continue present treatment.    5/13/24 - pt. Is enjoying her Purwick. Wants one at home. She needs to get up more. Will continue present treatment.    5/17/24 - IV line is out. Pt. Doing well. No complaints voiced.    Interval History: will continue present treatment.    Review of Systems  Objective:     Vital Signs (Most Recent):  Temp: 97.9 °F (36.6 °C) (05/17/24 0727)  Pulse: 79 (05/17/24 0727)  Resp: 18 (05/17/24 0727)  BP: (!) 121/59 (05/17/24 0727)  SpO2: 97 % (05/17/24 0727) Vital Signs (24h Range):  Temp:  [97.9 °F (36.6 °C)-98.1 °F (36.7 °C)] 97.9 °F (36.6 °C)  Pulse:  [68-80] 79  Resp:  [17-20] 18  SpO2:  [96 %-100 %] 97 %  BP: (117-121)/(59-71) 121/59     Weight: 85.8 kg (189 lb 2.5 oz)  Body mass index is 28.76 kg/m².    Intake/Output Summary (Last 24 hours) at 5/17/2024 0806  Last data filed at 5/16/2024 1140  Gross per 24 hour   Intake 240 ml   Output --   Net 240 ml         Physical Exam        Significant Labs: All pertinent labs within the past 24 hours have been reviewed.    Significant Imaging: I have reviewed all pertinent  imaging results/findings within the past 24 hours.    Assessment/Plan:      No notes have been filed under this hospital service.  Service: Hospital Medicine    VTE Risk Mitigation (From admission, onward)           Ordered     apixaban tablet 5 mg  2 times daily         05/07/24 2025     IP VTE HIGH RISK PATIENT  Once         05/07/24 1445     Place GHAZAL hose  Until discontinued         05/07/24 1446                    Discharge Planning   TREASURE: 5/27/2024     Code Status: Full Code   Is the patient medically ready for discharge?:     Reason for patient still in hospital (select all that apply): Patient trending condition  Discharge Plan A: Home Health, Home                  Catrachita Hyman MD  Department of Hospital Medicine   [facility] - Medical Surgical Unit

## 2024-05-18 PROCEDURE — 94640 AIRWAY INHALATION TREATMENT: CPT

## 2024-05-18 PROCEDURE — 25000003 PHARM REV CODE 250: Performed by: FAMILY MEDICINE

## 2024-05-18 PROCEDURE — 94761 N-INVAS EAR/PLS OXIMETRY MLT: CPT

## 2024-05-18 PROCEDURE — 11000004 HC SNF PRIVATE

## 2024-05-18 PROCEDURE — 27000221 HC OXYGEN, UP TO 24 HOURS

## 2024-05-18 PROCEDURE — 25000242 PHARM REV CODE 250 ALT 637 W/ HCPCS: Performed by: FAMILY MEDICINE

## 2024-05-18 PROCEDURE — 25000003 PHARM REV CODE 250: Performed by: EMERGENCY MEDICINE

## 2024-05-18 RX ADMIN — ATORVASTATIN CALCIUM 10 MG: 10 TABLET, FILM COATED ORAL at 08:05

## 2024-05-18 RX ADMIN — ALBUTEROL SULFATE 2.5 MG: 2.5 SOLUTION RESPIRATORY (INHALATION) at 01:05

## 2024-05-18 RX ADMIN — ALBUTEROL SULFATE 2.5 MG: 2.5 SOLUTION RESPIRATORY (INHALATION) at 07:05

## 2024-05-18 RX ADMIN — BUDESONIDE INHALATION 0.5 MG: 0.5 SUSPENSION RESPIRATORY (INHALATION) at 07:05

## 2024-05-18 RX ADMIN — DOCUSATE SODIUM 100 MG: 100 CAPSULE, LIQUID FILLED ORAL at 08:05

## 2024-05-18 RX ADMIN — APIXABAN 5 MG: 2.5 TABLET, FILM COATED ORAL at 08:05

## 2024-05-18 RX ADMIN — ACETAMINOPHEN 650 MG: 325 TABLET ORAL at 08:05

## 2024-05-18 RX ADMIN — SENNOSIDES AND DOCUSATE SODIUM 1 TABLET: 8.6; 5 TABLET ORAL at 08:05

## 2024-05-18 RX ADMIN — PAROXETINE HYDROCHLORIDE 20 MG: 10 TABLET, FILM COATED ORAL at 08:05

## 2024-05-18 RX ADMIN — LOSARTAN POTASSIUM 50 MG: 25 TABLET, FILM COATED ORAL at 08:05

## 2024-05-18 NOTE — PLAN OF CARE
Problem: Adult Inpatient Plan of Care  Goal: Plan of Care Review  Outcome: Progressing  Flowsheets (Taken 5/18/2024 1450)  Plan of Care Reviewed With: patient  Goal: Readiness for Transition of Care  Outcome: Progressing     Problem: Fall Injury Risk  Goal: Absence of Fall and Fall-Related Injury  Outcome: Progressing  Intervention: Promote Injury-Free Environment  Flowsheets (Taken 5/18/2024 1450)  Safety Promotion/Fall Prevention:   assistive device/personal item within reach   instructed to call staff for mobility   muscle strengthening facilitated   medications reviewed   in recliner, wheels locked

## 2024-05-19 PROCEDURE — 11000004 HC SNF PRIVATE

## 2024-05-19 PROCEDURE — 94761 N-INVAS EAR/PLS OXIMETRY MLT: CPT

## 2024-05-19 PROCEDURE — 25000242 PHARM REV CODE 250 ALT 637 W/ HCPCS: Performed by: FAMILY MEDICINE

## 2024-05-19 PROCEDURE — 25000003 PHARM REV CODE 250: Performed by: EMERGENCY MEDICINE

## 2024-05-19 PROCEDURE — 27000221 HC OXYGEN, UP TO 24 HOURS

## 2024-05-19 PROCEDURE — 99900035 HC TECH TIME PER 15 MIN (STAT)

## 2024-05-19 PROCEDURE — 25000003 PHARM REV CODE 250: Performed by: FAMILY MEDICINE

## 2024-05-19 PROCEDURE — 94640 AIRWAY INHALATION TREATMENT: CPT

## 2024-05-19 RX ADMIN — ALBUTEROL SULFATE 2.5 MG: 2.5 SOLUTION RESPIRATORY (INHALATION) at 07:05

## 2024-05-19 RX ADMIN — BUDESONIDE INHALATION 0.5 MG: 0.5 SUSPENSION RESPIRATORY (INHALATION) at 07:05

## 2024-05-19 RX ADMIN — ACETAMINOPHEN 650 MG: 325 TABLET ORAL at 08:05

## 2024-05-19 RX ADMIN — PAROXETINE HYDROCHLORIDE 20 MG: 10 TABLET, FILM COATED ORAL at 09:05

## 2024-05-19 RX ADMIN — DOCUSATE SODIUM 100 MG: 100 CAPSULE, LIQUID FILLED ORAL at 08:05

## 2024-05-19 RX ADMIN — APIXABAN 5 MG: 2.5 TABLET, FILM COATED ORAL at 08:05

## 2024-05-19 RX ADMIN — LOSARTAN POTASSIUM 50 MG: 25 TABLET, FILM COATED ORAL at 09:05

## 2024-05-19 RX ADMIN — ALBUTEROL SULFATE 2.5 MG: 2.5 SOLUTION RESPIRATORY (INHALATION) at 02:05

## 2024-05-19 RX ADMIN — ATORVASTATIN CALCIUM 10 MG: 10 TABLET, FILM COATED ORAL at 08:05

## 2024-05-19 RX ADMIN — APIXABAN 5 MG: 2.5 TABLET, FILM COATED ORAL at 09:05

## 2024-05-19 RX ADMIN — SENNOSIDES AND DOCUSATE SODIUM 1 TABLET: 8.6; 5 TABLET ORAL at 08:05

## 2024-05-19 RX ADMIN — DOCUSATE SODIUM 100 MG: 100 CAPSULE, LIQUID FILLED ORAL at 09:05

## 2024-05-19 NOTE — PLAN OF CARE
Problem: Breathing Pattern Ineffective  Goal: Effective Breathing Pattern  5/19/2024 0821 by Jacobin, Taymary, CRT  Outcome: Progressing  5/18/2024 1858 by Toshia Lynn, CRT  Outcome: Progressing     Problem: Gas Exchange Impaired  Goal: Optimal Gas Exchange  5/19/2024 0821 by Holin, Toshia, CRT  Outcome: Progressing  5/18/2024 1858 by Holin, Tayanta, CRT  Outcome: Progressing

## 2024-05-19 NOTE — PLAN OF CARE
Problem: Adult Inpatient Plan of Care  Goal: Plan of Care Review  Outcome: Progressing  Flowsheets (Taken 5/19/2024 9887)  Plan of Care Reviewed With: patient  Goal: Optimal Comfort and Wellbeing  Outcome: Progressing  Goal: Readiness for Transition of Care  Outcome: Progressing     Problem: Fall Injury Risk  Goal: Absence of Fall and Fall-Related Injury  Outcome: Progressing  Intervention: Promote Injury-Free Environment  Flowsheets (Taken 5/19/2024 5272)  Safety Promotion/Fall Prevention:   assistive device/personal item within reach   muscle strengthening facilitated   lighting adjusted   medications reviewed   nonskid shoes/socks when out of bed   instructed to call staff for mobility

## 2024-05-19 NOTE — PLAN OF CARE
Problem: Adult Inpatient Plan of Care  Goal: Plan of Care Review  Outcome: Progressing  Goal: Patient-Specific Goal (Individualized)  Outcome: Progressing     Problem: Fall Injury Risk  Goal: Absence of Fall and Fall-Related Injury  Outcome: Progressing  Intervention: Identify and Manage Contributors  Flowsheets (Taken 5/18/2024 2131)  Self-Care Promotion:   independence encouraged   BADL personal objects within reach   adaptive equipment use encouraged   BADL personal routines maintained  Medication Review/Management: medications reviewed  Intervention: Promote Injury-Free Environment  Flowsheets (Taken 5/18/2024 2131)  Safety Promotion/Fall Prevention:   assistive device/personal item within reach   bed alarm set   diversional activities provided   nonskid shoes/socks when out of bed   side rails raised x 3   instructed to call staff for mobility   medications reviewed   muscle strengthening facilitated   in recliner, wheels locked     Problem: Skin Injury Risk Increased  Goal: Skin Health and Integrity  Outcome: Progressing     Problem: Wound  Goal: Optimal Coping  Outcome: Progressing  Goal: Optimal Functional Ability  Outcome: Progressing  Goal: Absence of Infection Signs and Symptoms  Outcome: Progressing  Goal: Improved Oral Intake  Outcome: Progressing  Goal: Skin Health and Integrity  Outcome: Progressing  Goal: Optimal Wound Healing  Outcome: Progressing

## 2024-05-20 LAB
ANION GAP SERPL CALCULATED.3IONS-SCNC: 9 MMOL/L (ref 7–16)
BASOPHILS # BLD AUTO: 0.11 K/UL (ref 0–0.2)
BASOPHILS NFR BLD AUTO: 1.6 % (ref 0–1)
BUN SERPL-MCNC: 16 MG/DL (ref 7–18)
BUN/CREAT SERPL: 30 (ref 6–20)
CALCIUM SERPL-MCNC: 8.6 MG/DL (ref 8.5–10.1)
CHLORIDE SERPL-SCNC: 107 MMOL/L (ref 98–107)
CO2 SERPL-SCNC: 29 MMOL/L (ref 21–32)
CREAT SERPL-MCNC: 0.54 MG/DL (ref 0.55–1.02)
DIFFERENTIAL METHOD BLD: ABNORMAL
EGFR (NO RACE VARIABLE) (RUSH/TITUS): 86 ML/MIN/1.73M2
EOSINOPHIL # BLD AUTO: 0.68 K/UL (ref 0–0.5)
EOSINOPHIL NFR BLD AUTO: 10.1 % (ref 1–4)
ERYTHROCYTE [DISTWIDTH] IN BLOOD BY AUTOMATED COUNT: 15.3 % (ref 11.5–14.5)
GLUCOSE SERPL-MCNC: 90 MG/DL (ref 74–106)
HCT VFR BLD AUTO: 28.9 % (ref 38–47)
HGB BLD-MCNC: 9.1 G/DL (ref 12–16)
IMM GRANULOCYTES # BLD AUTO: 0.02 K/UL (ref 0–0.04)
IMM GRANULOCYTES NFR BLD: 0.3 % (ref 0–0.4)
LYMPHOCYTES # BLD AUTO: 2.2 K/UL (ref 1–4.8)
LYMPHOCYTES NFR BLD AUTO: 32.8 % (ref 27–41)
MCH RBC QN AUTO: 29.6 PG (ref 27–31)
MCHC RBC AUTO-ENTMCNC: 31.5 G/DL (ref 32–36)
MCV RBC AUTO: 94.1 FL (ref 80–96)
MONOCYTES # BLD AUTO: 0.89 K/UL (ref 0–0.8)
MONOCYTES NFR BLD AUTO: 13.3 % (ref 2–6)
MPC BLD CALC-MCNC: 10.1 FL (ref 9.4–12.4)
NEUTROPHILS # BLD AUTO: 2.81 K/UL (ref 1.8–7.7)
NEUTROPHILS NFR BLD AUTO: 41.9 % (ref 53–65)
NRBC # BLD AUTO: 0 X10E3/UL
NRBC, AUTO (.00): 0 %
PLATELET # BLD AUTO: 288 K/UL (ref 150–400)
POTASSIUM SERPL-SCNC: 4 MMOL/L (ref 3.5–5.1)
RBC # BLD AUTO: 3.07 M/UL (ref 4.2–5.4)
SODIUM SERPL-SCNC: 141 MMOL/L (ref 136–145)
WBC # BLD AUTO: 6.71 K/UL (ref 4.5–11)

## 2024-05-20 PROCEDURE — 25000003 PHARM REV CODE 250: Performed by: EMERGENCY MEDICINE

## 2024-05-20 PROCEDURE — 99900035 HC TECH TIME PER 15 MIN (STAT)

## 2024-05-20 PROCEDURE — 36415 COLL VENOUS BLD VENIPUNCTURE: CPT | Performed by: FAMILY MEDICINE

## 2024-05-20 PROCEDURE — 99308 SBSQ NF CARE LOW MDM 20: CPT | Mod: ,,, | Performed by: FAMILY MEDICINE

## 2024-05-20 PROCEDURE — 97110 THERAPEUTIC EXERCISES: CPT | Mod: CQ

## 2024-05-20 PROCEDURE — 94761 N-INVAS EAR/PLS OXIMETRY MLT: CPT

## 2024-05-20 PROCEDURE — 80048 BASIC METABOLIC PNL TOTAL CA: CPT | Performed by: FAMILY MEDICINE

## 2024-05-20 PROCEDURE — 97116 GAIT TRAINING THERAPY: CPT | Mod: CQ

## 2024-05-20 PROCEDURE — 11000004 HC SNF PRIVATE

## 2024-05-20 PROCEDURE — 97110 THERAPEUTIC EXERCISES: CPT

## 2024-05-20 PROCEDURE — 25000242 PHARM REV CODE 250 ALT 637 W/ HCPCS: Performed by: FAMILY MEDICINE

## 2024-05-20 PROCEDURE — 27000221 HC OXYGEN, UP TO 24 HOURS

## 2024-05-20 PROCEDURE — 94640 AIRWAY INHALATION TREATMENT: CPT

## 2024-05-20 PROCEDURE — 85025 COMPLETE CBC W/AUTO DIFF WBC: CPT | Performed by: FAMILY MEDICINE

## 2024-05-20 PROCEDURE — 25000003 PHARM REV CODE 250: Performed by: FAMILY MEDICINE

## 2024-05-20 RX ORDER — FLUCONAZOLE 150 MG/1
150 TABLET ORAL DAILY
Status: COMPLETED | OUTPATIENT
Start: 2024-05-20 | End: 2024-05-22

## 2024-05-20 RX ADMIN — APIXABAN 5 MG: 2.5 TABLET, FILM COATED ORAL at 08:05

## 2024-05-20 RX ADMIN — DOCUSATE SODIUM 100 MG: 100 CAPSULE, LIQUID FILLED ORAL at 08:05

## 2024-05-20 RX ADMIN — BUDESONIDE INHALATION 0.5 MG: 0.5 SUSPENSION RESPIRATORY (INHALATION) at 07:05

## 2024-05-20 RX ADMIN — PAROXETINE HYDROCHLORIDE 20 MG: 10 TABLET, FILM COATED ORAL at 08:05

## 2024-05-20 RX ADMIN — LOSARTAN POTASSIUM 50 MG: 25 TABLET, FILM COATED ORAL at 08:05

## 2024-05-20 RX ADMIN — SENNOSIDES AND DOCUSATE SODIUM 1 TABLET: 8.6; 5 TABLET ORAL at 08:05

## 2024-05-20 RX ADMIN — ALBUTEROL SULFATE 2.5 MG: 2.5 SOLUTION RESPIRATORY (INHALATION) at 04:05

## 2024-05-20 RX ADMIN — ATORVASTATIN CALCIUM 10 MG: 10 TABLET, FILM COATED ORAL at 08:05

## 2024-05-20 RX ADMIN — ALBUTEROL SULFATE 2.5 MG: 2.5 SOLUTION RESPIRATORY (INHALATION) at 07:05

## 2024-05-20 RX ADMIN — FLUCONAZOLE 150 MG: 150 TABLET ORAL at 08:05

## 2024-05-20 NOTE — PROGRESS NOTES
[facility]  Hospital Medicine  Progress Note    Patient Name: Verito Weston  MRN: [id number]  Patient Class: IP- Swing   Admission Date: 5/7/2024  Length of Stay: 13 days  Attending Physician: Catrachita Hyman,*  Primary Care Provider: Maricel Avila MD        Subjective:     Principal Problem:Muscular weakness        HPI:  This 89 yr. Old WF has been admitted to [facility] for Swing Bed Services. 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 She was transfused with 4 units prior to this admission. She has recently been diagnosed with breast cancer again. She has SSS with a pacemaker. She has chronic AF. She is also hypertensive. She is here for therapy. She does have a UTI and is presently on merrem.    Overview/Hospital Course:  5/10/24 - doing well this AM. Voices no complaint. Will continue present treatment.    5/13/24 - pt. Is enjoying her Purwick. Wants one at home. She needs to get up more. Will continue present treatment.    5/17/24 - IV line is out. Pt. Doing well. No complaints voiced.    5/20/24 - pt. Having some vaginal itching. Orders revised.    Interval History: having some vaginal itching. Orders revised.    Review of Systems  Objective:     Vital Signs (Most Recent):  Temp: 97.9 °F (36.6 °C) (05/20/24 0706)  Pulse: 71 (05/20/24 0806)  Resp: 20 (05/20/24 0806)  BP: (!) 143/76 (05/20/24 0706)  SpO2: 100 % (05/20/24 0806) Vital Signs (24h Range):  Temp:  [97.9 °F (36.6 °C)-98 °F (36.7 °C)] 97.9 °F (36.6 °C)  Pulse:  [64-80] 71  Resp:  [17-20] 20  SpO2:  [95 %-100 %] 100 %  BP: (103-143)/(67-76) 143/76     Weight: 85.3 kg (188 lb 1.6 oz)  Body mass index is 28.6 kg/m².    Intake/Output Summary (Last 24 hours) at 5/20/2024 0815  Last data filed at 5/19/2024 1217  Gross per 24 hour   Intake 480 ml   Output --   Net 480 ml         Physical Exam        Significant Labs: All pertinent labs within the past 24 hours have  been reviewed.    Significant Imaging: I have reviewed all pertinent imaging results/findings within the past 24 hours.    Assessment/Plan:      No notes have been filed under this hospital service.  Service: Hospital Medicine    VTE Risk Mitigation (From admission, onward)           Ordered     apixaban tablet 5 mg  2 times daily         05/07/24 2025     IP VTE HIGH RISK PATIENT  Once         05/07/24 1445     Place GHAZAL hose  Until discontinued         05/07/24 1446                    Discharge Planning   TREASURE: 5/27/2024     Code Status: Full Code   Is the patient medically ready for discharge?:     Reason for patient still in hospital (select all that apply): Patient trending condition  Discharge Plan A: Home Health, Home                  Catrachita Hyman MD  Department of Hospital Medicine   [facility] - Medical Surgical Unit

## 2024-05-20 NOTE — PLAN OF CARE
Spoke with pt about plans for discharge. Pt wants to discharge home Friday 5/24/24 with Lexx BARDALES.

## 2024-05-20 NOTE — PLAN OF CARE
Problem: Adult Inpatient Plan of Care  Goal: Plan of Care Review  Outcome: Progressing     Problem: Skin Injury Risk Increased  Goal: Skin Health and Integrity  Outcome: Progressing     Problem: Breathing Pattern Ineffective  Goal: Effective Breathing Pattern  Outcome: Progressing     Problem: Gas Exchange Impaired  Goal: Optimal Gas Exchange  Outcome: Progressing

## 2024-05-20 NOTE — PT/OT/SLP PROGRESS
Physical Therapy Treatment    Patient Name:  Verito Weston   MRN:  [id number]    Recommendations:     Discharge Recommendations: Low Intensity Therapy  Discharge Equipment Recommendations: none  Barriers to discharge: Decreased caregiver support    Assessment:     Verito Weston is a 89 y.o. female admitted with a medical diagnosis of Muscular weakness.  She presents with the following impairments/functional limitations: weakness, impaired self care skills, impaired functional mobility, gait instability, decreased upper extremity function, decreased lower extremity function, orthopedic precautions .  Patient's impairments have resulted in decrease safety and idependence with functional mobility and ADLs resulting in decreased ability to return home at this time.  No LOB and minimal deviations noted during gait training.  Patient is progressing toward rehab goals.     Rehab Prognosis: Good; patient would benefit from acute skilled PT services to address these deficits and reach maximum level of function.    Recent Surgery: * No surgery found *      Plan:     During this hospitalization, patient to be seen 5 x/week to address the identified rehab impairments via gait training, therapeutic activities, therapeutic exercises and progress toward the following goals:    Plan of Care Expires:  05/28/24    Subjective     Chief Complaint: weakness and decreased mobility   Patient/Family Comments/goals:  improve safety and independence with mobility for safe return home.   Pain/Comfort:  Pain Rating 1: 0/10      Objective:     Communicated with patient prior to initiation of treatment. She denies pain and is agreeable to treatment.     General Precautions: Standard, fall  Orthopedic Precautions: N/A  Braces: N/A  Respiratory Status: Nasal cannula, flow 2 L/min     Functional Mobility:  Transfers:     Sit to Stand:  moderate assistance with rolling walker  Gait: Approx. 110' with RW and CGA assist x 1 on level indoor surface  "      AM-PAC 6 CLICK MOBILITY  Turning over in bed (including adjusting bedclothes, sheets and blankets)?: 3  Sitting down on and standing up from a chair with arms (e.g., wheelchair, bedside commode, etc.): 3  Moving from lying on back to sitting on the side of the bed?: 2  Moving to and from a bed to a chair (including a wheelchair)?: 3  Need to walk in hospital room?: 3  Climbing 3-5 steps with a railing?: 1  Basic Mobility Total Score: 15       Treatment & Education:  Therapeutic exercises to bilateral LE's    Ther-Ex Reps       Ankle pumps    Quad sets 30 x 3"   Short Arc Quads'  2 x 10, 1.5# each    Horizontal Hip Abduction/Hip ADD 3 x 10 each   Hip ADD 3 x 10   Seated hip rotation  2 x 10, 1.5#    LAQ's 2 x 10, 1.5# *    Hamstring curls 3 x 10 yellow tb    Seated Marching 3 x 10 1.5#    Hip ABD 3 x 10 yellow TB                  Patient left up in chair with call button in reach       GOALS:   Multidisciplinary Problems       Physical Therapy Goals          Problem: Physical Therapy    Goal Priority Disciplines Outcome Goal Variances Interventions   Physical Therapy Goal     PT, PT/OT      Description: Short Term Goals  1. Patient will complete 30 reps of B LE exercises with correct form.   2. Patient will complete sit<>stand transfers with SBA.  3. Patient will ambulate 100 feet with RW on level surfaces with CGA.     Long Term Goals   1. Patient will ambulate 150 feet with RW on level and unlevel surfaces with SBA.  2. Patient will complete all functional transfers with MOD I.                        Time Tracking:     PT Received On: 05/20/24  PT Start Time: 0910     PT Stop Time: 1020  PT Total Time (min): 70 min     Billable Minutes: Gait Training 8 and Therapeutic Exercise 25  Therapeutic activities 5 minutes     Treatment Type: Treatment  PT/PTA: PTA     Number of PTA visits since last PT visit: 2     Continue Plan of Care per PT order to progress patient toward rehab goals as tolerated by patient. "   KATRINA Jolley     05/20/2024

## 2024-05-20 NOTE — PLAN OF CARE
Problem: Adult Inpatient Plan of Care  Goal: Plan of Care Review  Outcome: Progressing  Goal: Patient-Specific Goal (Individualized)  Outcome: Progressing  Goal: Absence of Hospital-Acquired Illness or Injury  Outcome: Progressing  Goal: Optimal Comfort and Wellbeing  Outcome: Progressing     Problem: Fall Injury Risk  Goal: Absence of Fall and Fall-Related Injury  Outcome: Progressing     Problem: Skin Injury Risk Increased  Goal: Skin Health and Integrity  Outcome: Progressing     Problem: Breathing Pattern Ineffective  Goal: Effective Breathing Pattern  Outcome: Progressing     Problem: Gas Exchange Impaired  Goal: Optimal Gas Exchange  Outcome: Progressing

## 2024-05-20 NOTE — SUBJECTIVE & OBJECTIVE
Interval History: having some vaginal itching. Orders revised.    Review of Systems  Objective:     Vital Signs (Most Recent):  Temp: 97.9 °F (36.6 °C) (05/20/24 0706)  Pulse: 71 (05/20/24 0806)  Resp: 20 (05/20/24 0806)  BP: (!) 143/76 (05/20/24 0706)  SpO2: 100 % (05/20/24 0806) Vital Signs (24h Range):  Temp:  [97.9 °F (36.6 °C)-98 °F (36.7 °C)] 97.9 °F (36.6 °C)  Pulse:  [64-80] 71  Resp:  [17-20] 20  SpO2:  [95 %-100 %] 100 %  BP: (103-143)/(67-76) 143/76     Weight: 85.3 kg (188 lb 1.6 oz)  Body mass index is 28.6 kg/m².    Intake/Output Summary (Last 24 hours) at 5/20/2024 0815  Last data filed at 5/19/2024 1217  Gross per 24 hour   Intake 480 ml   Output --   Net 480 ml         Physical Exam        Significant Labs: All pertinent labs within the past 24 hours have been reviewed.    Significant Imaging: I have reviewed all pertinent imaging results/findings within the past 24 hours.

## 2024-05-20 NOTE — PT/OT/SLP PROGRESS
Occupational Therapy   Treatment    Name: Verito Weston  MRN: [id number]  Admitting Diagnosis:  Muscular weakness       Recommendations:     Discharge Recommendations:    Discharge Equipment Recommendations:  none  Barriers to discharge:       Assessment:     Verito Weston is a 89 y.o. female with a medical diagnosis of Muscular weakness.   Performance deficits affecting function are weakness, impaired endurance, impaired self care skills, impaired functional mobility, impaired balance, decreased lower extremity function, decreased safety awareness.     Weight was decreased due to complaints of arm pain    Rehab Prognosis:  Good; patient would benefit from acute skilled OT services to address these deficits and reach maximum level of function.       Plan:     Patient to be seen 5 x/week to address the above listed problems via therapeutic activities, therapeutic exercises, self-care/home management  Plan of Care Expires:    Plan of Care Reviewed with: patient    Subjective     Chief Complaint: Shoulder pain  Patient/Family Comments/goals: Go home  Pain/Comfort:  Pain Rating 1: 0/10    Objective:     Communicated with: RN prior to session.  Patient found up in chair with oxygen upon OT entry to room.    General Precautions: Standard, fall    Orthopedic Precautions:   Braces:    Respiratory Status: Nasal cannula, flow 2 L/min     Occupational Performance:     Bed Mobility:    Patient was up in chair already    Functional Mobility/Transfers:  Patient completed Sit <> Stand Transfer with minimum assistance  with  rolling walker   Functional Mobility: N/A    Activities of Daily Living:  Feeding S/u   Grooming S/u   Bathing Mod a   UB dsg S/u   LB dsg Mod a   Toileting Min a   Toilet t/f Min a           AMPAC 6 Click ADL: 20    Treatment & Education:  Pt educated on OT role/POC.   Importance of OOB activity with staff assistance.  Importance of sitting up in the chair throughout the day as tolerated, especially for  meals   Safety during functional t/f and mobility  Importance of assisting with self-care activities     Patient completed the following for increased strength to increase I with ADLs:  1# dowel- chest press, bicep curls x 30, yellow theraflex x 40 both ways, yellow theraband- scapular retraction x 40; UBE 8 min        Patient left up in chair with call button in reach    GOALS:   Multidisciplinary Problems       Occupational Therapy Goals          Problem: Occupational Therapy    Goal Priority Disciplines Outcome Interventions   Occupational Therapy Goal     OT, PT/OT Progressing    Description: Description: Grooming Status:   Short Term Goal: Pt will perform grooming with s/u sitting EOB.   Long Term Goal: Pt will perform grooming/oral hygiene standing at sink with Mod I      LE dressing Status:   Short Term Goal: Pt will perform LE dressing with min a.   Long Term Goal: Pt will perform LE dressing with s/u.    Toileting Status:   Short Term Goal: Pt will perform toilet hygiene on BSC with min a.  Long Term Goal: Pt will perform toilet hygiene on toilet with no AE with s/u.    Commode Transfer:   Short Term Goal: Pt will perform BSC t/f with s/u.  Long Term Goal:  Pt will perform toilet t/f in bathroom with mod I.     Bathing Status:   Long Term Goal: Pt will perform sponge bath with s/u with no unsafe fatigue.     Strength Status:   Long Term Goal: Pt to perform BUE strengthening with weights and/or body weight to increase ADL independence and safety    Endurance Status:   Short Term Goal:pt to perform 15 min OT treatment with 5 or greater rest breaks  Long Term Goal: pt to perform 30 min OT treat with 3 or less rest breaks                       Time Tracking:     OT Date of Treatment: 05/20/24  OT Start Time: 1022  OT Stop Time: 1058  OT Total Time (min): 36 min    Billable Minutes:Therapeutic Exercise 36 min  Leann Rowley OTR/L      5/20/2024

## 2024-05-21 PROCEDURE — 11000004 HC SNF PRIVATE

## 2024-05-21 PROCEDURE — 25000242 PHARM REV CODE 250 ALT 637 W/ HCPCS: Performed by: FAMILY MEDICINE

## 2024-05-21 PROCEDURE — 25000003 PHARM REV CODE 250: Performed by: EMERGENCY MEDICINE

## 2024-05-21 PROCEDURE — 97110 THERAPEUTIC EXERCISES: CPT

## 2024-05-21 PROCEDURE — 27000221 HC OXYGEN, UP TO 24 HOURS

## 2024-05-21 PROCEDURE — 99900035 HC TECH TIME PER 15 MIN (STAT)

## 2024-05-21 PROCEDURE — 25000003 PHARM REV CODE 250: Performed by: FAMILY MEDICINE

## 2024-05-21 PROCEDURE — 97110 THERAPEUTIC EXERCISES: CPT | Mod: CQ

## 2024-05-21 PROCEDURE — 97116 GAIT TRAINING THERAPY: CPT | Mod: CQ

## 2024-05-21 PROCEDURE — 94640 AIRWAY INHALATION TREATMENT: CPT

## 2024-05-21 PROCEDURE — 94761 N-INVAS EAR/PLS OXIMETRY MLT: CPT

## 2024-05-21 RX ADMIN — ATORVASTATIN CALCIUM 10 MG: 10 TABLET, FILM COATED ORAL at 08:05

## 2024-05-21 RX ADMIN — DOCUSATE SODIUM 100 MG: 100 CAPSULE, LIQUID FILLED ORAL at 08:05

## 2024-05-21 RX ADMIN — LOSARTAN POTASSIUM 50 MG: 25 TABLET, FILM COATED ORAL at 08:05

## 2024-05-21 RX ADMIN — BUDESONIDE INHALATION 0.5 MG: 0.5 SUSPENSION RESPIRATORY (INHALATION) at 07:05

## 2024-05-21 RX ADMIN — APIXABAN 5 MG: 2.5 TABLET, FILM COATED ORAL at 08:05

## 2024-05-21 RX ADMIN — FLUCONAZOLE 150 MG: 150 TABLET ORAL at 08:05

## 2024-05-21 RX ADMIN — SENNOSIDES AND DOCUSATE SODIUM 1 TABLET: 8.6; 5 TABLET ORAL at 08:05

## 2024-05-21 RX ADMIN — ALBUTEROL SULFATE 2.5 MG: 2.5 SOLUTION RESPIRATORY (INHALATION) at 01:05

## 2024-05-21 RX ADMIN — ALBUTEROL SULFATE 2.5 MG: 2.5 SOLUTION RESPIRATORY (INHALATION) at 07:05

## 2024-05-21 RX ADMIN — PAROXETINE HYDROCHLORIDE 20 MG: 10 TABLET, FILM COATED ORAL at 08:05

## 2024-05-21 NOTE — PT/OT/SLP PROGRESS
Occupational Therapy   Treatment    Name: Verito Weston  MRN: [id number]  Admitting Diagnosis:  Muscular weakness       Recommendations:     Discharge Recommendations:    Discharge Equipment Recommendations:  none  Barriers to discharge:       Assessment:     Verito Weston is a 89 y.o. female with a medical diagnosis of Muscular weakness.   Performance deficits affecting function are weakness, impaired endurance, impaired self care skills, impaired functional mobility, impaired balance, decreased lower extremity function, decreased safety awareness, decreased upper extremity function.     Weight was decreased due to complaints of arm pain    Rehab Prognosis:  Good; patient would benefit from acute skilled OT services to address these deficits and reach maximum level of function.       Plan:     Patient to be seen 5 x/week to address the above listed problems via therapeutic exercises, therapeutic activities, self-care/home management  Plan of Care Expires:    Plan of Care Reviewed with: patient    Subjective     Chief Complaint: Shoulder pain  Patient/Family Comments/goals: Go home  Pain/Comfort:  Pain Rating 1: 0/10    Objective:     Communicated with: RN prior to session.  Patient found up in chair with oxygen upon OT entry to room.    General Precautions: Standard, fall    Orthopedic Precautions:   Braces:    Respiratory Status: Nasal cannula, flow 2 L/min     Occupational Performance:     Bed Mobility:    Patient was up in chair already    Functional Mobility/Transfers:  Patient completed Sit <> Stand Transfer with minimum assistance  with  rolling walker   Functional Mobility: N/A    Activities of Daily Living:  Feeding S/u   Grooming S/u   Bathing Mod a   UB dsg S/u   LB dsg Mod a   Toileting Min a   Toilet t/f Min a           AMPAC 6 Click ADL: 22    Treatment & Education:  Pt educated on OT role/POC.   Importance of OOB activity with staff assistance.  Importance of sitting up in the chair throughout the  day as tolerated, especially for meals   Safety during functional t/f and mobility  Importance of assisting with self-care activities     Patient completed the following for increased strength to increase I with ADLs:  2# dowel- chest press, bicep curls x 30, yellow theraflex x 40 both ways, yellow theraband- scapular retraction x 40; UBE 8 min        Patient left up in chair with call button in reach    GOALS:   Multidisciplinary Problems       Occupational Therapy Goals          Problem: Occupational Therapy    Goal Priority Disciplines Outcome Interventions   Occupational Therapy Goal     OT, PT/OT Progressing    Description: Description: Grooming Status:   Short Term Goal: Pt will perform grooming with s/u sitting EOB.   Long Term Goal: Pt will perform grooming/oral hygiene standing at sink with Mod I      LE dressing Status:   Short Term Goal: Pt will perform LE dressing with min a.   Long Term Goal: Pt will perform LE dressing with s/u.    Toileting Status:   Short Term Goal: Pt will perform toilet hygiene on BSC with min a.  Long Term Goal: Pt will perform toilet hygiene on toilet with no AE with s/u.    Commode Transfer:   Short Term Goal: Pt will perform BSC t/f with s/u.  Long Term Goal:  Pt will perform toilet t/f in bathroom with mod I.     Bathing Status:   Long Term Goal: Pt will perform sponge bath with s/u with no unsafe fatigue.     Strength Status:   Long Term Goal: Pt to perform BUE strengthening with weights and/or body weight to increase ADL independence and safety    Endurance Status:   Short Term Goal:pt to perform 15 min OT treatment with 5 or greater rest breaks  Long Term Goal: pt to perform 30 min OT treat with 3 or less rest breaks                       Time Tracking:     OT Date of Treatment: 05/21/24  OT Start Time: 1238  OT Stop Time: 1311  OT Total Time (min): 33 min    Billable Minutes:Therapeutic Exercise 33 min  Leann Rowley OTR/L      5/21/2024

## 2024-05-21 NOTE — PT/OT/SLP PROGRESS
Physical Therapy Treatment    Patient Name:  Verito Weston   MRN:  [id number]    Recommendations:     Discharge Recommendations: Low Intensity Therapy  Discharge Equipment Recommendations: none  Barriers to discharge: Decreased caregiver support    Assessment:     Verito Weston is a 89 y.o. female admitted with a medical diagnosis of Muscular weakness.  She presents with the following impairments/functional limitations: weakness, impaired endurance, impaired self care skills, impaired functional mobility, gait instability, impaired balance, decreased upper extremity function, decreased lower extremity function, pain, impaired cardiopulmonary response to activity .  Patient's impairments have resulted in decrease safety and idependence with functional mobility and ADLs resulting in decreased ability to return home at this time.  Patient reports she is feeling good today.  Patient progresses through physical therapy treatment session with improved activity tolerance.  No adverse effects noted or reported.       Rehab Prognosis: Good; patient would benefit from acute skilled PT services to address these deficits and reach maximum level of function.    Recent Surgery: * No surgery found *      Plan:     During this hospitalization, patient to be seen 5 x/week to address the identified rehab impairments via gait training, therapeutic activities, therapeutic exercises and progress toward the following goals:    Plan of Care Expires:  05/28/24    Subjective     Chief Complaint: weakness and decreased mobility   Patient/Family Comments/goals:  improve safety and independence with mobility for safe return home.   Pain/Comfort:  Pain Rating 1: 0/10      Objective:     Communicated with patient prior to initiation of treatment. She denies pain and is agreeable to treatment.     General Precautions: Standard, fall  Orthopedic Precautions: N/A  Braces: N/A  Respiratory Status: Nasal cannula, flow 2 L/min     Functional  "Mobility:  Transfers:     Sit to Stand:  moderate assistance with rolling walker  Gait: Approx. 110' with RW and CGA assist x 1 on level indoor surface       AM-PAC 6 CLICK MOBILITY  Turning over in bed (including adjusting bedclothes, sheets and blankets)?: 3  Sitting down on and standing up from a chair with arms (e.g., wheelchair, bedside commode, etc.): 3  Moving from lying on back to sitting on the side of the bed?: 2  Moving to and from a bed to a chair (including a wheelchair)?: 3  Need to walk in hospital room?: 3  Climbing 3-5 steps with a railing?: 1  Basic Mobility Total Score: 15       Treatment & Education:  Therapeutic exercises to bilateral LE's    Ther-Ex Reps       Ankle pumps    Quad sets 30 x 3"   Short Arc Quads'  2 x 10, 1.5# each    Horizontal Hip Abduction/Hip ADD 3 x 10 each   Hip ADD 3 x 10   Seated hip rotation  2 x 10, 1.5#    LAQ's 2 x 10, 1.5# *    Hamstring curls 3 x 10 yellow tb    Seated Marching 3 x 10 1.5#    Hip ABD 3 x 10 yellow TB                  Patient left up in chair with call button in reach       GOALS:   Multidisciplinary Problems       Physical Therapy Goals          Problem: Physical Therapy    Goal Priority Disciplines Outcome Goal Variances Interventions   Physical Therapy Goal     PT, PT/OT      Description: Short Term Goals  1. Patient will complete 30 reps of B LE exercises with correct form.   2. Patient will complete sit<>stand transfers with SBA.  3. Patient will ambulate 100 feet with RW on level surfaces with CGA.     Long Term Goals   1. Patient will ambulate 150 feet with RW on level and unlevel surfaces with SBA.  2. Patient will complete all functional transfers with MOD I.                        Time Tracking:     PT Received On: 05/21/24  PT Start Time: 0810     PT Stop Time: 0855  PT Total Time (min): 45 min     Billable Minutes: Gait Training 10 and Therapeutic Exercise 25  Therapeutic activities 0 minutes     Treatment Type: Treatment  PT/PTA: PTA   "   Number of PTA visits since last PT visit: 3     Continue Plan of Care per PT order to progress patient toward rehab goals as tolerated by patient.   KATRINA Jolley     05/21/2024

## 2024-05-21 NOTE — PLAN OF CARE
Problem: Adult Inpatient Plan of Care  Goal: Plan of Care Review  Outcome: Progressing  Flowsheets (Taken 5/20/2024 1942)  Plan of Care Reviewed With: patient  Goal: Patient-Specific Goal (Individualized)  Outcome: Progressing  Goal: Absence of Hospital-Acquired Illness or Injury  Outcome: Progressing  Intervention: Prevent Infection  Flowsheets (Taken 5/20/2024 1942)  Infection Prevention:   environmental surveillance performed   equipment surfaces disinfected   hand hygiene promoted   personal protective equipment utilized   rest/sleep promoted   single patient room provided  Goal: Optimal Comfort and Wellbeing  Outcome: Progressing  Intervention: Provide Person-Centered Care  Flowsheets (Taken 5/20/2024 1942)  Trust Relationship/Rapport:   reassurance provided   questions answered   emotional support provided   care explained   choices provided   empathic listening provided   questions encouraged   thoughts/feelings acknowledged  Goal: Readiness for Transition of Care  Outcome: Progressing  Intervention: Mutually Develop Transition Plan  Flowsheets (Taken 5/20/2024 1942)  Equipment Currently Used at Home: walker, rolling  Transportation Anticipated: family or friend will provide  Communicated TREASURE with patient/caregiver: Date not available/Unable to determine  Do you expect to return to your current living situation?: Yes  Do you have help at home or someone to help you manage your care at home?: Yes  Readmission within 30 days?: No  Do you currently have service(s) that help you manage your care at home?: No

## 2024-05-22 PROCEDURE — 97116 GAIT TRAINING THERAPY: CPT | Mod: CQ

## 2024-05-22 PROCEDURE — 27000221 HC OXYGEN, UP TO 24 HOURS

## 2024-05-22 PROCEDURE — 94761 N-INVAS EAR/PLS OXIMETRY MLT: CPT

## 2024-05-22 PROCEDURE — 97110 THERAPEUTIC EXERCISES: CPT | Mod: CQ

## 2024-05-22 PROCEDURE — 99900035 HC TECH TIME PER 15 MIN (STAT)

## 2024-05-22 PROCEDURE — 25000003 PHARM REV CODE 250: Performed by: EMERGENCY MEDICINE

## 2024-05-22 PROCEDURE — 11000004 HC SNF PRIVATE

## 2024-05-22 PROCEDURE — 25000003 PHARM REV CODE 250: Performed by: FAMILY MEDICINE

## 2024-05-22 PROCEDURE — 94640 AIRWAY INHALATION TREATMENT: CPT

## 2024-05-22 PROCEDURE — 97110 THERAPEUTIC EXERCISES: CPT

## 2024-05-22 PROCEDURE — 25000242 PHARM REV CODE 250 ALT 637 W/ HCPCS: Performed by: FAMILY MEDICINE

## 2024-05-22 RX ADMIN — APIXABAN 5 MG: 2.5 TABLET, FILM COATED ORAL at 08:05

## 2024-05-22 RX ADMIN — PAROXETINE HYDROCHLORIDE 20 MG: 10 TABLET, FILM COATED ORAL at 08:05

## 2024-05-22 RX ADMIN — BUDESONIDE INHALATION 0.5 MG: 0.5 SUSPENSION RESPIRATORY (INHALATION) at 07:05

## 2024-05-22 RX ADMIN — SENNOSIDES AND DOCUSATE SODIUM 1 TABLET: 8.6; 5 TABLET ORAL at 08:05

## 2024-05-22 RX ADMIN — ALBUTEROL SULFATE 2.5 MG: 2.5 SOLUTION RESPIRATORY (INHALATION) at 07:05

## 2024-05-22 RX ADMIN — LOSARTAN POTASSIUM 50 MG: 25 TABLET, FILM COATED ORAL at 08:05

## 2024-05-22 RX ADMIN — ALBUTEROL SULFATE 2.5 MG: 2.5 SOLUTION RESPIRATORY (INHALATION) at 01:05

## 2024-05-22 RX ADMIN — FLUCONAZOLE 150 MG: 150 TABLET ORAL at 08:05

## 2024-05-22 RX ADMIN — ATORVASTATIN CALCIUM 10 MG: 10 TABLET, FILM COATED ORAL at 08:05

## 2024-05-22 RX ADMIN — DOCUSATE SODIUM 100 MG: 100 CAPSULE, LIQUID FILLED ORAL at 08:05

## 2024-05-22 NOTE — PLAN OF CARE
Problem: Adult Inpatient Plan of Care  Goal: Plan of Care Review  Outcome: Progressing  Flowsheets (Taken 5/21/2024 1946)  Plan of Care Reviewed With: patient  Goal: Patient-Specific Goal (Individualized)  Outcome: Progressing  Goal: Absence of Hospital-Acquired Illness or Injury  Outcome: Progressing  Intervention: Prevent Infection  Flowsheets (Taken 5/21/2024 1946)  Infection Prevention:   environmental surveillance performed   equipment surfaces disinfected   hand hygiene promoted   personal protective equipment utilized   rest/sleep promoted   single patient room provided  Goal: Optimal Comfort and Wellbeing  Outcome: Progressing  Intervention: Provide Person-Centered Care  Flowsheets (Taken 5/21/2024 1946)  Trust Relationship/Rapport:   choices provided   emotional support provided   care explained   empathic listening provided   questions encouraged   questions answered   reassurance provided   thoughts/feelings acknowledged  Goal: Readiness for Transition of Care  Outcome: Progressing  Intervention: Mutually Develop Transition Plan  Flowsheets (Taken 5/21/2024 1946)  Equipment Currently Used at Home: walker, rolling  Transportation Anticipated: family or friend will provide  Communicated TREASURE with patient/caregiver: Date not available/Unable to determine  Do you expect to return to your current living situation?: Yes  Do you have help at home or someone to help you manage your care at home?: Yes  Readmission within 30 days?: No  Do you currently have service(s) that help you manage your care at home?: No

## 2024-05-22 NOTE — PLAN OF CARE
Problem: Adult Inpatient Plan of Care  Goal: Patient-Specific Goal (Individualized)  Outcome: Progressing  Goal: Optimal Comfort and Wellbeing  Outcome: Progressing  Intervention: Monitor Pain and Promote Comfort  Flowsheets (Taken 5/22/2024 1321)  Pain Management Interventions:   care clustered   pillow support provided   position adjusted  Intervention: Provide Person-Centered Care  Flowsheets (Taken 5/22/2024 1321)  Trust Relationship/Rapport:   care explained   choices provided   emotional support provided   empathic listening provided   questions answered   questions encouraged   reassurance provided   thoughts/feelings acknowledged

## 2024-05-22 NOTE — PT/OT/SLP PROGRESS
"Physical Therapy Treatment    Patient Name:  Verito Weston   MRN:  [id number]    Recommendations:     Discharge Recommendations: Low Intensity Therapy  Discharge Equipment Recommendations: none  Barriers to discharge: Decreased caregiver support    Assessment:     Verito Weston is a 89 y.o. female admitted with a medical diagnosis of Muscular weakness.  She presents with the following impairments/functional limitations: weakness, impaired endurance, impaired self care skills, impaired functional mobility, gait instability, decreased upper extremity function, decreased lower extremity function .  Patient's impairments have resulted in decrease safety and idependence with functional mobility and ADLs resulting in decreased ability to return home at this time.  Patient reports "I may need to go back to my room.  They gave me some laxative this morning".  Patient able to increase distance during gait training without adverse effects noted.       Rehab Prognosis: Good; patient would benefit from acute skilled PT services to address these deficits and reach maximum level of function.    Recent Surgery: * No surgery found *      Plan:     During this hospitalization, patient to be seen 5 x/week to address the identified rehab impairments via gait training, therapeutic activities, therapeutic exercises and progress toward the following goals:    Plan of Care Expires:  05/28/24    Subjective     Chief Complaint: weakness and decreased mobility   Patient/Family Comments/goals:  improve safety and independence with mobility for safe return home.   Pain/Comfort:  Pain Rating 1: 0/10      Objective:     Communicated with patient prior to initiation of treatment. She denies pain and is agreeable to treatment.     General Precautions: Standard, fall  Orthopedic Precautions: N/A  Braces: N/A  Respiratory Status: Nasal cannula, flow 2 L/min     Functional Mobility:  Transfers:     Sit to Stand:  moderate assistance with rolling " walker  Gait: Approx. 140' with RW and CGA assist x 1 on level indoor surface       AM-PAC 6 CLICK MOBILITY  Turning over in bed (including adjusting bedclothes, sheets and blankets)?: 3  Sitting down on and standing up from a chair with arms (e.g., wheelchair, bedside commode, etc.): 3  Moving from lying on back to sitting on the side of the bed?: 2  Moving to and from a bed to a chair (including a wheelchair)?: 3  Need to walk in hospital room?: 3  Climbing 3-5 steps with a railing?: 1  Basic Mobility Total Score: 15       Treatment & Education:  Therapeutic exercises to bilateral LE's    Ther-Ex Reps       Ankle pumps    Quad sets    Short Arc Quads'     Horizontal Hip Abduction/Hip ADD    Hip ADD 3 x 10   Seated hip rotation  2 x 10, 1.5#    LAQ's 2 x 10, 1.5# *    Hamstring curls 3 x 10 yellow tb    Seated Marching 3 x 10 1.5#    Hip ABD 3 x 10 yellow TB                  Patient left up in chair with call button in reach       GOALS:   Multidisciplinary Problems       Physical Therapy Goals          Problem: Physical Therapy    Goal Priority Disciplines Outcome Goal Variances Interventions   Physical Therapy Goal     PT, PT/OT      Description: Short Term Goals  1. Patient will complete 30 reps of B LE exercises with correct form.   2. Patient will complete sit<>stand transfers with SBA.  3. Patient will ambulate 100 feet with RW on level surfaces with CGA.     Long Term Goals   1. Patient will ambulate 150 feet with RW on level and unlevel surfaces with SBA.  2. Patient will complete all functional transfers with MOD I.                        Time Tracking:     PT Received On: 05/22/24  PT Start Time: 0815     PT Stop Time: 0849  PT Total Time (min): 34 min     Billable Minutes: Gait Training 10 and Therapeutic Exercise 20  Therapeutic activities 0 minutes     Treatment Type: Treatment  PT/PTA: PTA     Number of PTA visits since last PT visit: 4     Continue Plan of Care per PT order to progress patient toward  rehab goals as tolerated by patient.   KATRINA Jolley     05/22/2024

## 2024-05-22 NOTE — CONSULTS
[facility]  Adult Nutrition  Consult Note         Reason for Assessment  Reason For Assessment: RD follow-up SWB admit  Nutrition Risk Screen: no indicators present    Assessment and Plan  5/22/2024 RD Follow up: Patient continues on a regular diet. Po intake is good with % per flowsheets. Current weight 85.3 kg. Continue diet as tolerated. RD Following.     Consult received and appreciated. Patient admitted 5/7 with a dx of muscular weakness, UTI, and COPD. Patient is ordered a cardiac on admission. Recommend to change to regular with MD agreement. PO intake good since admit with 100% documented. No issues noted with diet tolerance at this time.     Patient is 85.8 kg with a BMI of 28.76 which is WNL per geriatric standards. Recommend liberalize diet to regular. Encourage po intakes. RD Following.           Learning Needs/Social Determinants of Health    Learning Assessment       05/07/2024 1730 [facility] (5/7/2024 - Present)   Created by Reyna Danielle RN - RN (Nurse) Status: Complete                 PRIMARY LEARNER     Primary Learner Name:  Jovanna Weston TS - 05/07/2024 1730    Relationship:  Patient TS - 05/07/2024 1730    Does the primary learner have any barriers to learning?:  Visual TS - 05/07/2024 1730    What is the preferred language of the primary learner?:  English TS - 05/07/2024 1730    Is an  required?:  No TS - 05/07/2024 1730    How does the primary learner prefer to learn new concepts?:  Listening, Demonstration TS - 05/07/2024 1730    How often do you need to have someone help you read instructions, pamphlets, or written material from your doctor or pharmacy?:  Never TS - 05/07/2024 1730        CO-LEARNER #1     No question answered        CO-LEARNER #2     No question answered        SPECIAL TOPICS     No question answered        ANSWERED BY:     -:  Patient TS - 05/07/2024 1730        Comments          Edit History       Reyna Danielle RN - RN (Nurse)   05/07/2024 1730                           Social Determinants of Health     Tobacco Use: Low Risk  (5/8/2024)    Patient History     Smoking Tobacco Use: Never     Smokeless Tobacco Use: Never     Passive Exposure: Not on file   Alcohol Use: Not At Risk (5/7/2024)    AUDIT-C     Frequency of Alcohol Consumption: Never     Average Number of Drinks: Patient does not drink     Frequency of Binge Drinking: Never   Financial Resource Strain: Low Risk  (5/7/2024)    Overall Financial Resource Strain (CARDIA)     Difficulty of Paying Living Expenses: Not hard at all   Food Insecurity: No Food Insecurity (5/7/2024)    Hunger Vital Sign     Worried About Running Out of Food in the Last Year: Never true     Ran Out of Food in the Last Year: Never true   Transportation Needs: Unknown (11/1/2022)    PRAPARE - Transportation     Lack of Transportation (Medical): No     Lack of Transportation (Non-Medical): Not on file   Physical Activity: Inactive (5/7/2024)    Exercise Vital Sign     Days of Exercise per Week: 0 days     Minutes of Exercise per Session: 0 min   Stress: No Stress Concern Present (5/7/2024)    [employer] - Occupational Stress Questionnaire     Feeling of Stress : Only a little   Housing Stability: Low Risk  (5/7/2024)    Housing Stability Vital Sign     Unable to Pay for Housing in the Last Year: No     Homeless in the Last Year: No   Depression: Not on file   Utilities: Not At Risk (5/7/2024)    [facility] Utilities     Threatened with loss of utilities: No   Health Literacy: Adequate Health Literacy (5/7/2024)     Health Literacy     Frequency of need for help with medical instructions: Never   Social Isolation: Not on file (5/7/2024)          Malnutrition  Is Patient Malnourished: No    Nutrition Diagnosis  No Nutritional Diagnosis at this time   Recent Labs   Lab 05/20/24  0444   GLU 90     Comments on Glucose: WNL    Nutrition  Prescription / Recommendations  Recommendation/Intervention: Recommend to liberalize diet to regular. encourage po intakes  Goals: po intake % with weight maintenance during admission  Nutrition Goal Status: goal met  Current Diet Order: Regular  Nutrition Order Comments: diet changed from cardiac  Chewing or Swallowing Difficulty?: No Chewing or swallowing difficulty  Recommended Diet: Regular  Recommended Oral Supplement: No Oral Supplements  Is Nutrition Support Recommended: [facility] Nutrition Support: No  Is Nutrition Education Recommended: No    Monitor and Evaluation  % current Intake: P.O. intake of 75 - 100 %  % intake to meet estimated needs: 75 - 100 %  Food and Nutrient Intake: energy intake, food and beverage intake  Food and Nutrient Adminstration: diet order  Anthropometric Measurements: height/length, weight, weight change, body mass index  Biochemical Data, Medical Tests and Procedures: electrolyte and renal panel, gastrointestinal profile, glucose/endocrine profile, inflammatory profile, lipid profile  Energy Calories Required: meeting needs  Protein Required: meeting needs  Fluid Required: meeting needs  Tolerance: tolerating    Current Medical Diagnosis and Past Medical History     Past Medical History:   Diagnosis Date    Afib     Asthma     Cancer     right breast    COPD (chronic obstructive pulmonary disease)     2L NC @ home    hx DVT in BLE     Hypercholesterolemia     Hypertension     Mass of lower outer quadrant of right breast 03/12/2024    Pacemaker 03/01/2022    Dr. Ramirez @ [facility]    Unspecified chronic bronchitis        Nutrition/Diet History  Spiritual, Cultural Beliefs, Voodoo Practices, Values that Affect Care: no  Food Allergies: NKFA  Factors Affecting Nutritional Intake: None identified at this time    Lab/Procedures/Meds  Recent Labs   Lab 05/20/24  0444      K 4.0   BUN 16   CREATININE 0.54*   CALCIUM 8.6      Last A1c: No results found for:  ""HGBA1C"  Lab Results   Component Value Date    RBC 3.07 (L) 05/20/2024    HGB 9.1 (L) 05/20/2024    HCT 28.9 (L) 05/20/2024    MCV 94.1 05/20/2024    MCH 29.6 05/20/2024    MCHC 31.5 (L) 05/20/2024     Pertinent Labs Reviewed: reviewed  Pertinent Labs Comments: Na 134 BUN 21  Pertinent Medications Reviewed: reviewed  Scheduled Meds:   albuterol sulfate  2.5 mg Nebulization Q6H WAKE    apixaban  5 mg Oral BID    atorvastatin  10 mg Oral QHS    budesonide  0.5 mg Nebulization Q12H    docusate sodium  100 mg Oral BID    losartan  50 mg Oral Daily    paroxetine  20 mg Oral Daily    polyethylene glycol  17 g Oral Daily    senna-docusate 8.6-50 mg  1 tablet Oral BID     Continuous Infusions:  PRN Meds:.  Current Facility-Administered Medications:     acetaminophen, 650 mg, Oral, Q6H PRN    albuterol-ipratropium, 3 mL, Nebulization, QID PRN    calcium carbonate, 500 mg, Oral, BID PRN    lactulose, 20 g, Oral, Q8H PRN    melatonin, 6 mg, Oral, Nightly PRN    Anthropometrics  Temp: 97.9 °F (36.6 °C)  Height Method: Stated  Height: 5' 8" (172.7 cm)  Height (inches): 68 in  Weight Method: Bed Scale  Weight: 85.3 kg (188 lb 1.6 oz)  Weight (lb): 188.1 lb  Ideal Body Weight (IBW), Female: 140 lb  % Ideal Body Weight, Female (lb): 135.11 %  BMI (Calculated): 28.6       Estimated/Assessed Needs      Temp: 97.9 °F (36.6 °C)Oral  Weight Used For Calorie Calculations: 85.8 kg (189 lb 2.5 oz)   Energy Need Method: Kcal/kg Energy Calorie Requirements (kcal): 5809-2667  Weight Used For Protein Calculations: 85.8 kg (189 lb 2.5 oz)  Protein Requirements: 69-86  Estimated Fluid Requirement Method: RDA Method    RDA Method (mL): 2145       Nutrition by Nursing  Diet/Nutrition Received: regular  Intake (%): 100%        Last Bowel Movement: 05/20/24                Nutrition Follow-Up  RD Follow-up?: Yes      Nutrition Discharge Planning: home with family/HH; recommend liberalized diet as tolerated        Available via Secure Chat  "

## 2024-05-22 NOTE — PT/OT/SLP PROGRESS
Occupational Therapy   Treatment    Name: Verito Weston  MRN: [id number]  Admitting Diagnosis:  Muscular weakness       Recommendations:     Discharge Recommendations:    Discharge Equipment Recommendations:  none  Barriers to discharge:       Assessment:     Verito Weston is a 89 y.o. female with a medical diagnosis of Muscular weakness.   Performance deficits affecting function are weakness, impaired endurance, impaired self care skills, impaired functional mobility, impaired balance, decreased lower extremity function, decreased safety awareness.     Weight was decreased due to complaints of arm pain    Rehab Prognosis:  Good; patient would benefit from acute skilled OT services to address these deficits and reach maximum level of function.       Plan:     Patient to be seen 5 x/week to address the above listed problems via therapeutic exercises, therapeutic activities, self-care/home management  Plan of Care Expires:    Plan of Care Reviewed with: patient    Subjective     Chief Complaint: Shoulder pain  Patient/Family Comments/goals: Go home  Pain/Comfort:  Pain Rating 1: 0/10    Objective:     Communicated with: RN prior to session.  Patient found up in chair with oxygen upon OT entry to room.    General Precautions: Standard, fall    Orthopedic Precautions:   Braces:    Respiratory Status: Nasal cannula, flow 2 L/min     Occupational Performance:     Bed Mobility:    Patient was up in chair already    Functional Mobility/Transfers:  Patient completed Sit <> Stand Transfer with minimum assistance  with  rolling walker   Functional Mobility: N/A    Activities of Daily Living:  Feeding S/u   Grooming S/u   Bathing Mod a   UB dsg S/u   LB dsg Mod a   Toileting Mod I   Toilet t/f Mod I     Patient was assisted to bathroom with ROLLING WALKER and Mod I      AMPAC 6 Click ADL: 22    Treatment & Education:  Pt educated on OT role/POC.   Importance of OOB activity with staff assistance.  Importance of sitting up in  the chair throughout the day as tolerated, especially for meals   Safety during functional t/f and mobility  Importance of assisting with self-care activities     Patient completed the following for increased strength to increase I with ADLs:  2# dowel- chest press, bicep curls x 30, yellow theraflex x 40 both ways    Patient had to abruptly go back to room secondary having a laxative. Patient was asked before OT session if she needed to go to bathroom and she said no. Patient voiced we would have to try this another day.        Patient left up in chair with call button in reach    GOALS:   Multidisciplinary Problems       Occupational Therapy Goals          Problem: Occupational Therapy    Goal Priority Disciplines Outcome Interventions   Occupational Therapy Goal     OT, PT/OT Progressing    Description: Description: Grooming Status:   Short Term Goal: Pt will perform grooming with s/u sitting EOB.   Long Term Goal: Pt will perform grooming/oral hygiene standing at sink with Mod I      LE dressing Status:   Short Term Goal: Pt will perform LE dressing with min a.   Long Term Goal: Pt will perform LE dressing with s/u.    Toileting Status:   Short Term Goal: Pt will perform toilet hygiene on BSC with min a.  Long Term Goal: Pt will perform toilet hygiene on toilet with no AE with s/u.    Commode Transfer:   Short Term Goal: Pt will perform BSC t/f with s/u.  Long Term Goal:  Pt will perform toilet t/f in bathroom with mod I.     Bathing Status:   Long Term Goal: Pt will perform sponge bath with s/u with no unsafe fatigue.     Strength Status:   Long Term Goal: Pt to perform BUE strengthening with weights and/or body weight to increase ADL independence and safety    Endurance Status:   Short Term Goal:pt to perform 15 min OT treatment with 5 or greater rest breaks  Long Term Goal: pt to perform 30 min OT treat with 3 or less rest breaks                       Time Tracking:     OT Date of Treatment: 05/22/24  OT  Start Time: 0850  OT Stop Time: 0901  OT Total Time (min): 11 min    Billable Minutes:Self Care/Home Management 3 min  Therapeutic Exercise 8 min  Leann Rowley, OTR/L      5/22/2024

## 2024-05-23 LAB
ANION GAP SERPL CALCULATED.3IONS-SCNC: 8 MMOL/L (ref 7–16)
BASOPHILS # BLD AUTO: 0.06 K/UL (ref 0–0.2)
BASOPHILS NFR BLD AUTO: 0.9 % (ref 0–1)
BUN SERPL-MCNC: 13 MG/DL (ref 7–18)
BUN/CREAT SERPL: 27 (ref 6–20)
CALCIUM SERPL-MCNC: 8.7 MG/DL (ref 8.5–10.1)
CHLORIDE SERPL-SCNC: 104 MMOL/L (ref 98–107)
CO2 SERPL-SCNC: 30 MMOL/L (ref 21–32)
CREAT SERPL-MCNC: 0.49 MG/DL (ref 0.55–1.02)
DIFFERENTIAL METHOD BLD: ABNORMAL
EGFR (NO RACE VARIABLE) (RUSH/TITUS): 88 ML/MIN/1.73M2
EOSINOPHIL # BLD AUTO: 0.79 K/UL (ref 0–0.5)
EOSINOPHIL NFR BLD AUTO: 12.5 % (ref 1–4)
ERYTHROCYTE [DISTWIDTH] IN BLOOD BY AUTOMATED COUNT: 15.3 % (ref 11.5–14.5)
GLUCOSE SERPL-MCNC: 91 MG/DL (ref 74–106)
HCT VFR BLD AUTO: 29.1 % (ref 38–47)
HGB BLD-MCNC: 9.3 G/DL (ref 12–16)
IMM GRANULOCYTES # BLD AUTO: 0.02 K/UL (ref 0–0.04)
IMM GRANULOCYTES NFR BLD: 0.3 % (ref 0–0.4)
LYMPHOCYTES # BLD AUTO: 1.95 K/UL (ref 1–4.8)
LYMPHOCYTES NFR BLD AUTO: 30.9 % (ref 27–41)
MCH RBC QN AUTO: 29.6 PG (ref 27–31)
MCHC RBC AUTO-ENTMCNC: 32 G/DL (ref 32–36)
MCV RBC AUTO: 92.7 FL (ref 80–96)
MONOCYTES # BLD AUTO: 0.75 K/UL (ref 0–0.8)
MONOCYTES NFR BLD AUTO: 11.9 % (ref 2–6)
MPC BLD CALC-MCNC: 10.2 FL (ref 9.4–12.4)
NEUTROPHILS # BLD AUTO: 2.75 K/UL (ref 1.8–7.7)
NEUTROPHILS NFR BLD AUTO: 43.5 % (ref 53–65)
NRBC # BLD AUTO: 0 X10E3/UL
NRBC, AUTO (.00): 0 %
PLATELET # BLD AUTO: 242 K/UL (ref 150–400)
POTASSIUM SERPL-SCNC: 3.8 MMOL/L (ref 3.5–5.1)
RBC # BLD AUTO: 3.14 M/UL (ref 4.2–5.4)
SODIUM SERPL-SCNC: 138 MMOL/L (ref 136–145)
WBC # BLD AUTO: 6.32 K/UL (ref 4.5–11)

## 2024-05-23 PROCEDURE — 25000003 PHARM REV CODE 250: Performed by: FAMILY MEDICINE

## 2024-05-23 PROCEDURE — 97110 THERAPEUTIC EXERCISES: CPT

## 2024-05-23 PROCEDURE — 25000242 PHARM REV CODE 250 ALT 637 W/ HCPCS: Performed by: FAMILY MEDICINE

## 2024-05-23 PROCEDURE — 94640 AIRWAY INHALATION TREATMENT: CPT

## 2024-05-23 PROCEDURE — 27000221 HC OXYGEN, UP TO 24 HOURS

## 2024-05-23 PROCEDURE — 36415 COLL VENOUS BLD VENIPUNCTURE: CPT | Performed by: FAMILY MEDICINE

## 2024-05-23 PROCEDURE — 97110 THERAPEUTIC EXERCISES: CPT | Mod: CQ

## 2024-05-23 PROCEDURE — 11000004 HC SNF PRIVATE

## 2024-05-23 PROCEDURE — 99900035 HC TECH TIME PER 15 MIN (STAT)

## 2024-05-23 PROCEDURE — 97116 GAIT TRAINING THERAPY: CPT | Mod: CQ

## 2024-05-23 PROCEDURE — 94761 N-INVAS EAR/PLS OXIMETRY MLT: CPT

## 2024-05-23 PROCEDURE — 80048 BASIC METABOLIC PNL TOTAL CA: CPT | Performed by: FAMILY MEDICINE

## 2024-05-23 PROCEDURE — 36416 COLLJ CAPILLARY BLOOD SPEC: CPT

## 2024-05-23 PROCEDURE — 85025 COMPLETE CBC W/AUTO DIFF WBC: CPT | Performed by: FAMILY MEDICINE

## 2024-05-23 PROCEDURE — 25000003 PHARM REV CODE 250: Performed by: EMERGENCY MEDICINE

## 2024-05-23 RX ADMIN — SENNOSIDES AND DOCUSATE SODIUM 1 TABLET: 8.6; 5 TABLET ORAL at 08:05

## 2024-05-23 RX ADMIN — APIXABAN 5 MG: 2.5 TABLET, FILM COATED ORAL at 08:05

## 2024-05-23 RX ADMIN — LOSARTAN POTASSIUM 50 MG: 25 TABLET, FILM COATED ORAL at 08:05

## 2024-05-23 RX ADMIN — DOCUSATE SODIUM 100 MG: 100 CAPSULE, LIQUID FILLED ORAL at 08:05

## 2024-05-23 RX ADMIN — ALBUTEROL SULFATE 2.5 MG: 2.5 SOLUTION RESPIRATORY (INHALATION) at 07:05

## 2024-05-23 RX ADMIN — BUDESONIDE INHALATION 0.5 MG: 0.5 SUSPENSION RESPIRATORY (INHALATION) at 07:05

## 2024-05-23 RX ADMIN — ATORVASTATIN CALCIUM 10 MG: 10 TABLET, FILM COATED ORAL at 08:05

## 2024-05-23 RX ADMIN — ALBUTEROL SULFATE 2.5 MG: 2.5 SOLUTION RESPIRATORY (INHALATION) at 02:05

## 2024-05-23 RX ADMIN — PAROXETINE HYDROCHLORIDE 20 MG: 10 TABLET, FILM COATED ORAL at 08:05

## 2024-05-23 NOTE — PLAN OF CARE
Problem: VTE (Venous Thromboembolism)  Goal: Tissue Perfusion  Outcome: Progressing  Goal: Right Ventricular Function  Outcome: Progressing

## 2024-05-23 NOTE — PT/OT/SLP PROGRESS
"Physical Therapy Treatment    Patient Name:  Verito Weston   MRN:  [id number]    Recommendations:     Discharge Recommendations: Low Intensity Therapy  Discharge Equipment Recommendations: none  Barriers to discharge: Decreased caregiver support    Assessment:     Verito Weston is a 89 y.o. female admitted with a medical diagnosis of Muscular weakness.  She presents with the following impairments/functional limitations: weakness, impaired endurance, impaired self care skills, impaired functional mobility, gait instability, impaired balance, decreased upper extremity function, decreased lower extremity function .  Patient's impairments have resulted in decrease safety and idependence with functional mobility and ADLs resulting in decreased ability to return home at this time.  Patient reports "I'm supposed to be leaving here tomorrow to go home. This therapy has helped me so much and ya'll have been so good to me".  Patient presents with good carryover progressing through completion of therapeutic exercises.  No adverse effects noted to PT treatment session.     Rehab Prognosis: Good; patient would benefit from acute skilled PT services to address these deficits and reach maximum level of function.    Recent Surgery: * No surgery found *      Plan:     During this hospitalization, patient to be seen 5 x/week to address the identified rehab impairments via gait training, therapeutic activities, therapeutic exercises and progress toward the following goals:    Plan of Care Expires:  05/28/24    Subjective     Chief Complaint: weakness and decreased mobility   Patient/Family Comments/goals:  improve safety and independence with mobility for safe return home.   Pain/Comfort:  Pain Rating 1: 0/10      Objective:     Communicated with patient prior to initiation of treatment. She denies pain and is agreeable to treatment.     General Precautions: Standard, fall  Orthopedic Precautions: N/A  Braces: N/A  Respiratory " Status: Nasal cannula, flow 2 L/min     Functional Mobility:  Transfers:     Sit to Stand:  CGA with rolling walker  Gait: Approx. 140' with RW and CGA assist x 1 on level indoor surface       AM-PAC 6 CLICK MOBILITY  Turning over in bed (including adjusting bedclothes, sheets and blankets)?: 3  Sitting down on and standing up from a chair with arms (e.g., wheelchair, bedside commode, etc.): 3  Moving from lying on back to sitting on the side of the bed?: 2  Moving to and from a bed to a chair (including a wheelchair)?: 3  Need to walk in hospital room?: 3  Climbing 3-5 steps with a railing?: 1 (not attempted)  Basic Mobility Total Score: 15       Treatment & Education:  Therapeutic exercises to bilateral LE's    Ther-Ex Reps       Ankle pumps    Quad sets    Short Arc Quads'  3 x 10    Horizontal Hip Abduction/Hip ADD    Hip ADD 3 x 10   Seated hip rotation  2 x 10, 1.5#    LAQ's 2 x 10, 1.5# *    Hamstring curls 3 x 10 green tb    Seated Marching 3 x 10 1.5#    Hip ABD 3 x 10 green tb                   Patient left up in chair with call button in reach       GOALS:   Multidisciplinary Problems       Physical Therapy Goals          Problem: Physical Therapy    Goal Priority Disciplines Outcome Goal Variances Interventions   Physical Therapy Goal     PT, PT/OT      Description: Short Term Goals  1. Patient will complete 30 reps of B LE exercises with correct form.   2. Patient will complete sit<>stand transfers with SBA.  3. Patient will ambulate 100 feet with RW on level surfaces with CGA.     Long Term Goals   1. Patient will ambulate 150 feet with RW on level and unlevel surfaces with SBA.  2. Patient will complete all functional transfers with MOD I.                        Time Tracking:     PT Received On: 05/23/24  PT Start Time: 1317     PT Stop Time: 1350  PT Total Time (min): 33 min     Billable Minutes: Therapeutic exercise 22 minutes;  Gait  8    Treatment Type: Treatment  PT/PTA: PTA     Number of PTA  visits since last PT visit: 5     Continue Plan of Care per PT order to progress patient toward rehab goals as tolerated by patient.   KATRINA Jolley     05/23/2024

## 2024-05-23 NOTE — PLAN OF CARE
Problem: Fall Injury Risk  Goal: Absence of Fall and Fall-Related Injury  Outcome: Progressing     Problem: Skin Injury Risk Increased  Goal: Skin Health and Integrity  Outcome: Progressing     Problem: Breathing Pattern Ineffective  Goal: Effective Breathing Pattern  Outcome: Progressing     Problem: Gas Exchange Impaired  Goal: Optimal Gas Exchange  Outcome: Progressing     Problem: Pain Acute  Goal: Optimal Pain Control and Function  Outcome: Progressing     Problem: Fatigue  Goal: Improved Activity Tolerance  Outcome: Progressing

## 2024-05-23 NOTE — PT/OT/SLP PROGRESS
Occupational Therapy   Treatment    Name: Verito Weston  MRN: [id number]  Admitting Diagnosis:  Muscular weakness       Recommendations:     Discharge Recommendations:    Discharge Equipment Recommendations:  none  Barriers to discharge:       Assessment:     Verito Weston is a 89 y.o. female with a medical diagnosis of Muscular weakness.   Performance deficits affecting function are weakness, impaired endurance, impaired self care skills, impaired functional mobility, impaired balance, decreased lower extremity function, decreased safety awareness.     Weight was decreased due to complaints of arm pain    Rehab Prognosis:  Good; patient would benefit from acute skilled OT services to address these deficits and reach maximum level of function.       Plan:     Patient to be seen 5 x/week to address the above listed problems via therapeutic exercises, therapeutic activities, self-care/home management  Plan of Care Expires:    Plan of Care Reviewed with: patient    Subjective     Chief Complaint: Shoulder pain  Patient/Family Comments/goals: Go home  Pain/Comfort:  Pain Rating 1: 0/10    Objective:     Communicated with: RN prior to session.  Patient found up in chair with oxygen upon OT entry to room.    General Precautions: Standard, fall    Orthopedic Precautions:   Braces:    Respiratory Status: Nasal cannula, flow 2 L/min     Occupational Performance:     Bed Mobility:    Patient was up in chair already    Functional Mobility/Transfers:  Patient completed Sit <> Stand Transfer with minimum assistance  with  rolling walker   Functional Mobility: N/A    Activities of Daily Living:  Feeding S/u   Grooming S/u   Bathing Mod a   UB dsg S/u   LB dsg Mod a   Toileting Mod I   Toilet t/f Mod I     Patient was assisted to bathroom with ROLLING WALKER and Mod I      AMPAC 6 Click ADL: 22    Treatment & Education:  Pt educated on OT role/POC.   Importance of OOB activity with staff assistance.  Importance of sitting up in  the chair throughout the day as tolerated, especially for meals   Safety during functional t/f and mobility  Importance of assisting with self-care activities     Patient completed the following for increased strength to increase I with ADLs:  UBE 8 min; 2# dowel- chest press, bicep curls x 30, yellow theraflex x 40 both ways; yellow TB- scapular retraction x 40          Patient left up in chair with call button in reach    GOALS:   Multidisciplinary Problems       Occupational Therapy Goals          Problem: Occupational Therapy    Goal Priority Disciplines Outcome Interventions   Occupational Therapy Goal     OT, PT/OT Progressing    Description: Description: Grooming Status:   Short Term Goal: Pt will perform grooming with s/u sitting EOB.   Long Term Goal: Pt will perform grooming/oral hygiene standing at sink with Mod I      LE dressing Status:   Short Term Goal: Pt will perform LE dressing with min a.   Long Term Goal: Pt will perform LE dressing with s/u.    Toileting Status:   Short Term Goal: Pt will perform toilet hygiene on BSC with min a.  Long Term Goal: Pt will perform toilet hygiene on toilet with no AE with s/u.    Commode Transfer:   Short Term Goal: Pt will perform BSC t/f with s/u.  Long Term Goal:  Pt will perform toilet t/f in bathroom with mod I.     Bathing Status:   Long Term Goal: Pt will perform sponge bath with s/u with no unsafe fatigue.     Strength Status:   Long Term Goal: Pt to perform BUE strengthening with weights and/or body weight to increase ADL independence and safety    Endurance Status:   Short Term Goal:pt to perform 15 min OT treatment with 5 or greater rest breaks  Long Term Goal: pt to perform 30 min OT treat with 3 or less rest breaks                       Time Tracking:     OT Date of Treatment: 05/23/24  OT Start Time: 1242  OT Stop Time: 1312  OT Total Time (min): 30 min    Billable Minutes:Therapeutic Exercise 30 min  Leann Rowley OTR/L      5/23/2024

## 2024-05-23 NOTE — PLAN OF CARE
Problem: Adult Inpatient Plan of Care  Goal: Plan of Care Review  Outcome: Progressing  Goal: Patient-Specific Goal (Individualized)  Outcome: Progressing  Goal: Absence of Hospital-Acquired Illness or Injury  Outcome: Progressing  Goal: Optimal Comfort and Wellbeing  Outcome: Progressing  Goal: Readiness for Transition of Care  Outcome: Progressing     Problem: Fall Injury Risk  Goal: Absence of Fall and Fall-Related Injury  Outcome: Progressing  Intervention: Identify and Manage Contributors  Flowsheets (Taken 5/23/2024 1541)  Self-Care Promotion:   independence encouraged   BADL personal objects within reach   BADL personal routines maintained   adaptive equipment use encouraged  Medication Review/Management: medications reviewed  Intervention: Promote Injury-Free Environment  Flowsheets (Taken 5/23/2024 1549)  Safety Promotion/Fall Prevention:   assistive device/personal item within reach   chair alarm set   diversional activities provided   Fall Risk reviewed with patient/family   Fall Risk signage in place   in recliner, wheels locked   lighting adjusted   medications reviewed   muscle strengthening facilitated   nonskid shoes/socks when out of bed   room near unit station   instructed to call staff for mobility     Problem: Skin Injury Risk Increased  Goal: Skin Health and Integrity  Outcome: Progressing  Intervention: Optimize Skin Protection  Flowsheets (Taken 5/23/2024 154)  Pressure Reduction Techniques:   frequent weight shift encouraged   pressure points protected  Pressure Reduction Devices:   chair cushion utilized   heel offloading device utilized  Skin Protection:   incontinence pads utilized   protective footwear used  Activity Management:   Ambulated to bathroom - L4   Up in chair - L3  Intervention: Promote and Optimize Oral Intake  Flowsheets (Taken 5/23/2024 6567)  Oral Nutrition Promotion:   calorie-dense foods provided   calorie-dense liquids provided   physical activity promoted      Problem: Wound  Goal: Optimal Coping  Outcome: Progressing  Goal: Optimal Functional Ability  Outcome: Progressing  Goal: Absence of Infection Signs and Symptoms  Outcome: Progressing  Goal: Improved Oral Intake  Outcome: Progressing  Goal: Optimal Pain Control and Function  Outcome: Progressing  Goal: Skin Health and Integrity  Outcome: Progressing  Goal: Optimal Wound Healing  Outcome: Progressing

## 2024-05-24 VITALS
BODY MASS INDEX: 28.51 KG/M2 | SYSTOLIC BLOOD PRESSURE: 130 MMHG | WEIGHT: 188.13 LBS | RESPIRATION RATE: 18 BRPM | TEMPERATURE: 98 F | HEIGHT: 68 IN | HEART RATE: 72 BPM | DIASTOLIC BLOOD PRESSURE: 70 MMHG | OXYGEN SATURATION: 98 %

## 2024-05-24 PROCEDURE — 99315 NF DSCHRG MGMT 30 MIN/LESS: CPT | Mod: ,,, | Performed by: FAMILY MEDICINE

## 2024-05-24 PROCEDURE — 25000003 PHARM REV CODE 250: Performed by: FAMILY MEDICINE

## 2024-05-24 PROCEDURE — 25000003 PHARM REV CODE 250: Performed by: EMERGENCY MEDICINE

## 2024-05-24 PROCEDURE — 94761 N-INVAS EAR/PLS OXIMETRY MLT: CPT

## 2024-05-24 PROCEDURE — 99900035 HC TECH TIME PER 15 MIN (STAT)

## 2024-05-24 PROCEDURE — 27000221 HC OXYGEN, UP TO 24 HOURS

## 2024-05-24 RX ADMIN — LOSARTAN POTASSIUM 50 MG: 25 TABLET, FILM COATED ORAL at 08:05

## 2024-05-24 RX ADMIN — PAROXETINE HYDROCHLORIDE 20 MG: 10 TABLET, FILM COATED ORAL at 08:05

## 2024-05-24 RX ADMIN — DOCUSATE SODIUM 100 MG: 100 CAPSULE, LIQUID FILLED ORAL at 08:05

## 2024-05-24 RX ADMIN — APIXABAN 5 MG: 2.5 TABLET, FILM COATED ORAL at 08:05

## 2024-05-24 RX ADMIN — SENNOSIDES AND DOCUSATE SODIUM 1 TABLET: 8.6; 5 TABLET ORAL at 08:05

## 2024-05-24 NOTE — DISCHARGE SUMMARY
[facility]  Hospital Medicine  Discharge Summary      Patient Name: Verito Weston  MRN: [id number]  JAVIER: [id number]  Patient Class: IP- Swing  Admission Date: 5/7/2024  Hospital Length of Stay: 17 days  Discharge Date and Time:  05/24/2024 8:11 AM  Attending Physician: Catrachita Hodge,*   Discharging Provider: Catrachita Hodge MD  Primary Care Provider: Maricel Avila MD    Primary Care Team: Networked reference to record PCT     HPI:   This 89 yr. Old WF has been admitted to [facility] for Swing Bed Services. 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 She was transfused with 4 units prior to this admission. She has recently been diagnosed with breast cancer again. She has SSS with a pacemaker. She has chronic AF. She is also hypertensive. She is here for therapy. She does have a UTI and is presently on merrem.    * No surgery found *      Hospital Course:   5/10/24 - doing well this AM. Voices no complaint. Will continue present treatment.    5/13/24 - pt. Is enjoying her Purwick. Wants one at home. She needs to get up more. Will continue present treatment.    5/17/24 - IV line is out. Pt. Doing well. No complaints voiced.    5/20/24 - pt. Having some vaginal itching. Orders revised.    5/24/24 - pt. Has done well. She wants a Purwick for home use. She has stress urinary incontinence. Home health will follow patient at home.     Goals of Care Treatment Preferences:  Code Status: Full Code      Consults:   Consults (From admission, onward)          Status Ordering Provider     Inpatient consult to Social Work  Once        Provider:  (Not yet assigned)    Acknowledged CATRACHITA HODGE     Inpatient consult to Registered Dietitian/Nutritionist  Once        Provider:  (Not yet assigned)    Completed CATRACHITA HODGE            No new Assessment & Plan notes have been filed under this hospital service since  the last note was generated.  Service: Hospital Medicine    Final Active Diagnoses:    Diagnosis Date Noted POA    PRINCIPAL PROBLEM:  Muscular weakness [M62.81] 05/08/2024 Yes      Problems Resolved During this Admission:       Discharged Condition: stable    Disposition:     Follow Up:    Patient Instructions:   No discharge procedures on file.    Significant Diagnostic Studies: Labs: All labs within the past 24 hours have been reviewed    Pending Diagnostic Studies:       None           Medications:  Reconciled Home Medications:      Medication List        START taking these medications      tamoxifen 20 MG Tab  Commonly known as: NOLVADEX  Take 20 mg by mouth once daily.            CONTINUE taking these medications      albuterol-ipratropium 2.5 mg-0.5 mg/3 mL nebulizer solution  Commonly known as: DUO-NEB  Take 3 mLs by nebulization 4 (four) times daily as needed.     apixaban 5 mg Tab  Commonly known as: ELIQUIS  Take 1 tablet (5 mg total) by mouth 2 (two) times daily.     atorvastatin 10 MG tablet  Commonly known as: LIPITOR  Take 10 mg by mouth every evening.     docusate sodium 100 MG capsule  Commonly known as: COLACE  Take 100 mg by mouth 2 (two) times daily.     fluticasone furoate-vilanteroL 100-25 mcg/dose diskus inhaler  Commonly known as: BREO  Inhale 1 puff into the lungs once daily.     hydroCHLOROthiazide 12.5 mg capsule  Commonly known as: MICROZIDE  12.5 mg once daily.     lactulose 10 gram/15 mL solution  Commonly known as: CHRONULAC  Take 20 g by mouth every 8 (eight) hours as needed (constipation).     losartan 50 MG tablet  Commonly known as: COZAAR  Take 50 mg by mouth once daily.     meclizine 25 mg tablet  Commonly known as: ANTIVERT  25 mg 3 (three) times daily as needed.     paroxetine 20 MG tablet  Commonly known as: PAXIL  Take 20 mg by mouth once daily.     polyethylene glycol 17 gram Pwpk  Commonly known as: GLYCOLAX  Take 17 g by mouth once daily.     XIIDRA 5 % Dpet  Generic drug:  lifitegrast  Apply 1 drop to eye 2 (two) times a day.            STOP taking these medications      azithromycin 250 MG tablet  Commonly known as: Z-JOHANA     HYDROcodone-acetaminophen 5-325 mg per tablet  Commonly known as: NORCO     oxyCODONE-acetaminophen  mg per tablet  Commonly known as: PERCOCET              Indwelling Lines/Drains at time of discharge:   Lines/Drains/Airways       None                   Time spent on the discharge of patient: 30 minutes         Catrachita Hyman MD  Department of Hospital Medicine  [facility]

## 2024-05-24 NOTE — PLAN OF CARE
[facility]  Discharge Final Note    Primary Care Provider: Maricel Avila MD    Expected Discharge Date: 5/24/2024    Final Discharge Note (most recent)       Final Note - 05/24/24 0921          Final Note    Assessment Type Final Discharge Note (P)      Anticipated Discharge Disposition Home-Health Care Svc (P)      What phone number can be called within the next 1-3 days to see how you are doing after discharge? [phone] (P)         Post-Acute Status    Post-Acute Authorization Home Health (P)      Home Health Status Set-up Complete/Auth obtained (P)      Coverage Medicare (P)      Patient choice form signed by patient/caregiver List from CMS Compare (P)      Discharge Delays None known at this time (P)                      Important Message from Medicare             Contact Info       Maricel Avila MD   Specialty: Family Medicine   Relationship: PCP - General    [id number]  [facility] [place] [id number]   Phone: [phone]       Next Steps: Schedule an appointment as soon as possible for a visit    Instructions: Follow up with in a week of discharge., As needed        Pt discharging home with family and [facility]. Pt already owns the needed DMEs.

## 2024-05-24 NOTE — PLAN OF CARE
Problem: Fall Injury Risk  Goal: Absence of Fall and Fall-Related Injury  Outcome: Progressing     Problem: Skin Injury Risk Increased  Goal: Skin Health and Integrity  Outcome: Progressing     Problem: Breathing Pattern Ineffective  Goal: Effective Breathing Pattern  Outcome: Progressing     Problem: Pain Acute  Goal: Optimal Pain Control and Function  Outcome: Progressing     Problem: Fatigue  Goal: Improved Activity Tolerance  Outcome: Progressing     Problem: VTE (Venous Thromboembolism)  Goal: Tissue Perfusion  Outcome: Progressing  Goal: Right Ventricular Function  Outcome: Progressing

## 2024-05-31 ENCOUNTER — HOSPITAL ENCOUNTER (EMERGENCY)
Facility: HOSPITAL | Age: 89
Discharge: HOME | End: 2024-05-31
Attending: SPECIALIST
Payer: MEDICARE

## 2024-05-31 VITALS
TEMPERATURE: 100 F | DIASTOLIC BLOOD PRESSURE: 87 MMHG | RESPIRATION RATE: 22 BRPM | OXYGEN SATURATION: 95 % | WEIGHT: 180 LBS | HEIGHT: 68 IN | HEART RATE: 86 BPM | BODY MASS INDEX: 27.28 KG/M2 | SYSTOLIC BLOOD PRESSURE: 156 MMHG

## 2024-05-31 DIAGNOSIS — R05.9 COUGH: ICD-10-CM

## 2024-05-31 DIAGNOSIS — R07.89 CHEST WALL PAIN: Primary | ICD-10-CM

## 2024-05-31 LAB
ANION GAP SERPL CALCULATED.3IONS-SCNC: 14 MMOL/L (ref 7–16)
BASOPHILS # BLD AUTO: 0.09 K/UL (ref 0–0.2)
BASOPHILS NFR BLD AUTO: 0.8 % (ref 0–1)
BUN SERPL-MCNC: 16 MG/DL (ref 7–18)
BUN/CREAT SERPL: 24 (ref 6–20)
CALCIUM SERPL-MCNC: 9.1 MG/DL (ref 8.5–10.1)
CHLORIDE SERPL-SCNC: 103 MMOL/L (ref 98–107)
CO2 SERPL-SCNC: 26 MMOL/L (ref 21–32)
CREAT SERPL-MCNC: 0.68 MG/DL (ref 0.55–1.02)
DIFFERENTIAL METHOD BLD: ABNORMAL
EGFR (NO RACE VARIABLE) (RUSH/TITUS): 81 ML/MIN/1.73M2
EOSINOPHIL # BLD AUTO: 0.31 K/UL (ref 0–0.5)
EOSINOPHIL NFR BLD AUTO: 2.7 % (ref 1–4)
ERYTHROCYTE [DISTWIDTH] IN BLOOD BY AUTOMATED COUNT: 15.3 % (ref 11.5–14.5)
GLUCOSE SERPL-MCNC: 151 MG/DL (ref 74–106)
GROUP A STREP MOLECULAR (OHS): NEGATIVE
HCT VFR BLD AUTO: 35.6 % (ref 38–47)
HGB BLD-MCNC: 11.5 G/DL (ref 12–16)
IMM GRANULOCYTES # BLD AUTO: 0.03 K/UL (ref 0–0.04)
IMM GRANULOCYTES NFR BLD: 0.3 % (ref 0–0.4)
INFLUENZA A MOLECULAR (OHS): NEGATIVE
INFLUENZA B MOLECULAR (OHS): NEGATIVE
LACTATE SERPL-SCNC: 1.6 MMOL/L (ref 0.4–2)
LYMPHOCYTES # BLD AUTO: 1.55 K/UL (ref 1–4.8)
LYMPHOCYTES NFR BLD AUTO: 13.7 % (ref 27–41)
MAGNESIUM SERPL-MCNC: 1.9 MG/DL (ref 1.7–2.3)
MCH RBC QN AUTO: 29.7 PG (ref 27–31)
MCHC RBC AUTO-ENTMCNC: 32.3 G/DL (ref 32–36)
MCV RBC AUTO: 92 FL (ref 80–96)
MONOCYTES # BLD AUTO: 1.16 K/UL (ref 0–0.8)
MONOCYTES NFR BLD AUTO: 10.2 % (ref 2–6)
MPC BLD CALC-MCNC: 9.9 FL (ref 9.4–12.4)
NEUTROPHILS # BLD AUTO: 8.18 K/UL (ref 1.8–7.7)
NEUTROPHILS NFR BLD AUTO: 72.3 % (ref 53–65)
NRBC # BLD AUTO: 0 X10E3/UL
NRBC, AUTO (.00): 0 %
PLATELET # BLD AUTO: 233 K/UL (ref 150–400)
POTASSIUM SERPL-SCNC: 4.4 MMOL/L (ref 3.5–5.1)
RBC # BLD AUTO: 3.87 M/UL (ref 4.2–5.4)
RSV AG SPEC QL IA: NEGATIVE
SARS-COV-2 RDRP RESP QL NAA+PROBE: NEGATIVE
SODIUM SERPL-SCNC: 139 MMOL/L (ref 136–145)
TROPONIN I SERPL DL<=0.01 NG/ML-MCNC: 11.1 PG/ML
WBC # BLD AUTO: 11.32 K/UL (ref 4.5–11)

## 2024-05-31 PROCEDURE — 93010 ELECTROCARDIOGRAM REPORT: CPT | Mod: ,,, | Performed by: INTERNAL MEDICINE

## 2024-05-31 PROCEDURE — 84484 ASSAY OF TROPONIN QUANT: CPT | Performed by: SPECIALIST

## 2024-05-31 PROCEDURE — 87634 RSV DNA/RNA AMP PROBE: CPT | Performed by: SPECIALIST

## 2024-05-31 PROCEDURE — 25000003 PHARM REV CODE 250: Performed by: SPECIALIST

## 2024-05-31 PROCEDURE — 96365 THER/PROPH/DIAG IV INF INIT: CPT

## 2024-05-31 PROCEDURE — 94760 N-INVAS EAR/PLS OXIMETRY 1: CPT

## 2024-05-31 PROCEDURE — 87635 SARS-COV-2 COVID-19 AMP PRB: CPT | Performed by: SPECIALIST

## 2024-05-31 PROCEDURE — 83605 ASSAY OF LACTIC ACID: CPT | Performed by: SPECIALIST

## 2024-05-31 PROCEDURE — 96375 TX/PRO/DX INJ NEW DRUG ADDON: CPT

## 2024-05-31 PROCEDURE — 27000221 HC OXYGEN, UP TO 24 HOURS

## 2024-05-31 PROCEDURE — 87651 STREP A DNA AMP PROBE: CPT | Performed by: SPECIALIST

## 2024-05-31 PROCEDURE — 85025 COMPLETE CBC W/AUTO DIFF WBC: CPT | Performed by: SPECIALIST

## 2024-05-31 PROCEDURE — 87502 INFLUENZA DNA AMP PROBE: CPT | Performed by: SPECIALIST

## 2024-05-31 PROCEDURE — 83735 ASSAY OF MAGNESIUM: CPT | Performed by: SPECIALIST

## 2024-05-31 PROCEDURE — 87040 BLOOD CULTURE FOR BACTERIA: CPT | Performed by: SPECIALIST

## 2024-05-31 PROCEDURE — 63600175 PHARM REV CODE 636 W HCPCS: Performed by: SPECIALIST

## 2024-05-31 PROCEDURE — 99284 EMERGENCY DEPT VISIT MOD MDM: CPT | Performed by: SPECIALIST

## 2024-05-31 PROCEDURE — 99284 EMERGENCY DEPT VISIT MOD MDM: CPT | Mod: 25

## 2024-05-31 PROCEDURE — 80048 BASIC METABOLIC PNL TOTAL CA: CPT | Performed by: SPECIALIST

## 2024-05-31 PROCEDURE — 93005 ELECTROCARDIOGRAM TRACING: CPT

## 2024-05-31 RX ORDER — CEFDINIR 300 MG/1
300 CAPSULE ORAL 2 TIMES DAILY
Qty: 20 CAPSULE | Refills: 0 | Status: SHIPPED | OUTPATIENT
Start: 2024-05-31 | End: 2024-06-10

## 2024-05-31 RX ORDER — METOPROLOL TARTRATE 1 MG/ML
5 INJECTION, SOLUTION INTRAVENOUS
Status: COMPLETED | OUTPATIENT
Start: 2024-05-31 | End: 2024-05-31

## 2024-05-31 RX ORDER — BENZONATATE 100 MG/1
100 CAPSULE ORAL 3 TIMES DAILY PRN
Qty: 30 CAPSULE | Refills: 0 | Status: SHIPPED | OUTPATIENT
Start: 2024-05-31 | End: 2024-06-10

## 2024-05-31 RX ADMIN — METOPROLOL TARTRATE 5 MG: 1 INJECTION, SOLUTION INTRAVENOUS at 06:05

## 2024-05-31 RX ADMIN — SODIUM CHLORIDE 500 ML: 9 INJECTION, SOLUTION INTRAVENOUS at 06:05

## 2024-05-31 RX ADMIN — DEXTROSE MONOHYDRATE 1 G: 5 INJECTION INTRAVENOUS at 06:05

## 2024-05-31 NOTE — ED PROVIDER NOTES
"Patient just got out of hospital last Friday and states that she has chronic cough but this is "slightly more than usual". But patient denies any shortness of breath.  She is on 2 liters oxygen chronically.  Encounter Date: 5/31/2024       History     Chief Complaint   Patient presents with    Chest Pain     Patient comes in with complaints of chest pain, patient wears home 02 at 2 liters came via pov without portable. Patient reports cough per normal. States she pain radiates in between shoulder blades worse with inspiration     Patient is a 89 yo wf who is in NAD.  She is coming to the ER for evaluation of a cough and chest pain between scapulas.  She recently was in the hospital and swing bed for a length of time for PE/DVT.  She is on anti coagulopathy therapy with Eliquis bid.  She also has a pacemaker, Afib, Asthma, COPD, Cancer of R Breast, HLD, HTN, and chronic Bronchitis.  Patient is speaking in full sentences and is strong.  She stated to MD that she wants to go home if at all possible.       Review of patient's allergies indicates:  No Known Allergies  Past Medical History:   Diagnosis Date    Afib     Asthma     Cancer     right breast    COPD (chronic obstructive pulmonary disease)     2L NC @ home    hx DVT in BLE     Hypercholesterolemia     Hypertension     Mass of lower outer quadrant of right breast 03/12/2024    Pacemaker 03/01/2022    Dr. Ramirez @ [facility]    Unspecified chronic bronchitis      Past Surgical History:   Procedure Laterality Date    APPENDECTOMY      BREAST BIOPSY Right 3/12/2024    Procedure: BIOPSY, BREAST;  Surgeon: Ashleigh Treadwell MD;  Location: [facility];  Service: General;  Laterality: Right;    cataract surgery       CHOLECYSTECTOMY      HYSTERECTOMY      INSERTION OF PACEMAKER Left 03/01/2022    Dr. Ramirez @ [facility]    JOINT REPLACEMENT      left knee    SHOULDER SURGERY Bilateral      Family History   Problem Relation Name Age of Onset    Stroke Grandchild   "     Social History     Tobacco Use    Smoking status: Never    Smokeless tobacco: Never   Substance Use Topics    Alcohol use: Not Currently    Drug use: Not Currently     Review of Systems   Constitutional:  Negative for activity change and appetite change.   HENT: Negative.     Eyes: Negative.    Respiratory:  Positive for cough.    Cardiovascular:  Positive for chest pain.        Between scapulas   Gastrointestinal: Negative.    Endocrine: Negative.    Genitourinary: Negative.    Allergic/Immunologic: Negative.    Neurological: Negative.    All other systems reviewed and are negative.      Physical Exam     Initial Vitals [05/31/24 1714]   BP Pulse Resp Temp SpO2   (!) 154/85 109 (!) 22 99.6 °F (37.6 °C) (!) 94 %      MAP       --         Physical Exam    Medical Screening Exam   See Full Note    ED Course   Procedures  Labs Reviewed   BASIC METABOLIC PANEL - Abnormal; Notable for the following components:       Result Value    Glucose 151 (*)     BUN/Creatinine Ratio 24 (*)     All other components within normal limits   CBC WITH DIFFERENTIAL - Abnormal; Notable for the following components:    WBC 11.32 (*)     RBC 3.87 (*)     Hemoglobin 11.5 (*)     Hematocrit 35.6 (*)     RDW 15.3 (*)     Neutrophils % 72.3 (*)     Lymphocytes % 13.7 (*)     Monocytes % 10.2 (*)     Neutrophils, Abs 8.18 (*)     Monocytes, Absolute 1.16 (*)     All other components within normal limits   INFLUENZA A & B BY MOLECULAR - Normal   MAGNESIUM - Normal   LACTIC ACID, PLASMA - Normal   TROPONIN I - Normal   SARS-COV-2 RNA AMPLIFICATION, QUAL - Normal    Narrative:     Negative SARS-CoV results should not be used as the sole basis for treatment or patient management decisions; negative results should be considered in the context of a patient's recent exposures, history and the presene of clinical signs and symptoms consistent with COVID-19.  Negative results should be treated as presumptive and confirmed by molecular assay, if  necessary for patient management.   STREP A BY MOLECULAR METHOD - Normal   RSV, RAPID AG BY MOLECULAR METHOD - Normal   CULTURE, BLOOD   CBC W/ AUTO DIFFERENTIAL    Narrative:     The following orders were created for panel order CBC auto differential.  Procedure                               Abnormality         Status                     ---------                               -----------         ------                     CBC with Differential[[id number]]       Abnormal            Final result                 Please view results for these tests on the individual orders.          Imaging Results              X-Ray Chest PA And Lateral (Final result)  Result time 05/31/24 17:45:34      Final result by Walter Martinez DO (05/31/24 17:45:34)                   Impression:      As above      Electronically signed by: Walter Martinez  Date:    05/31/2024  Time:    17:45               Narrative:    EXAMINATION:  XR CHEST PA AND LATERAL    CLINICAL HISTORY:  Cough, unspecified    TECHNIQUE:  XR CHEST PA AND LATERAL    COMPARISON:  04/29/2024    FINDINGS:  No lines or tubes.    Prominence of the pulmonary vasculature    Normal pleura.    Cardiac silhouette is similar to comparison exam.    No obvious acute bone findings.                                       Medications   sodium chloride 0.9% bolus 500 mL 500 mL (500 mLs Intravenous New Bag 5/31/24 1829)   cefTRIAXone (Rocephin) 1 g in dextrose 5 % in water (D5W) 100 mL IVPB (MB+) (1 g Intravenous New Bag 5/31/24 1832)   metoprolol injection 5 mg (5 mg Intravenous Given 5/31/24 1826)     Medical Decision Making  Amount and/or Complexity of Data Reviewed  Labs: ordered.  Radiology: ordered.    Risk  Prescription drug management.               ED Course as of 05/31/24 1850   Fri May 31, 2024   1756 89 yo wf with cough in NAD.  Lactic 1.6  [VB]      ED Course User Index  [VB] Bianca Crowley MD                           Clinical Impression:   Final diagnoses:  [R05.9]  Cough  [R07.89] Chest wall pain (Primary)        ED Disposition Condition    Discharge Stable          ED Prescriptions       Medication Sig Dispense Start Date End Date Auth. Provider    cefdinir (OMNICEF) 300 MG capsule Take 1 capsule (300 mg total) by mouth 2 (two) times daily. for 10 days 20 capsule 5/31/2024 6/10/2024 Bianca Crowley MD    benzonatate (TESSALON PERLES) 100 MG capsule Take 1 capsule (100 mg total) by mouth 3 (three) times daily as needed for Cough. 30 capsule 5/31/2024 6/10/2024 Bianca Crowley MD          Follow-up Information    None          Bianca Crowley MD  05/31/24 1116

## 2024-06-03 NOTE — PLAN OF CARE
Post discharge follow up call: pt states she went to the ER following discharge for a cough and is doing a little better since then. Home health is following.

## 2024-06-06 NOTE — ADDENDUM NOTE
Encounter addended by: Myra Murillo on: 6/6/2024 4:12 PM   Actions taken: SmartForm saved, Flowsheet accepted, Charge Capture section accepted

## 2024-06-07 LAB — BACTERIA BLD CULT: NORMAL

## 2024-06-07 NOTE — ADDENDUM NOTE
Encounter addended by: Sara Moore on: 6/7/2024 7:35 AM   Actions taken: Charge Capture section accepted

## 2024-06-18 LAB
OHS QRS DURATION: 82 MS
OHS QTC CALCULATION: 411 MS

## 2024-10-01 NOTE — PLAN OF CARE
Orders:    busPIRone (BUSPAR) 5 mg tablet; Buspirone HCL 5 m tablet po daily    DULoxetine (CYMBALTA) 20 mg capsule; Take 1 capsule (20 mg total) by mouth daily Duloxetine 20mg : 1 tablet po daily  at bedtime       Problem: Adult Inpatient Plan of Care  Goal: Plan of Care Review  Outcome: Progressing  Flowsheets (Taken 5/17/2024 5201)  Plan of Care Reviewed With: patient  Goal: Optimal Comfort and Wellbeing  Outcome: Progressing     Problem: Fall Injury Risk  Goal: Absence of Fall and Fall-Related Injury  Outcome: Progressing

## 2024-11-25 ENCOUNTER — TELEPHONE (OUTPATIENT)
Dept: VASCULAR SURGERY | Facility: CLINIC | Age: 89
End: 2024-11-25
Payer: MEDICARE

## 2025-07-01 ENCOUNTER — OFFICE VISIT (OUTPATIENT)
Dept: DERMATOLOGY | Facility: CLINIC | Age: OVER 89
End: 2025-07-01
Payer: MEDICARE

## 2025-07-01 DIAGNOSIS — L29.9 SCALP PRURITUS: ICD-10-CM

## 2025-07-01 DIAGNOSIS — D48.5 NEOPLASM OF UNCERTAIN BEHAVIOR OF SKIN: Primary | ICD-10-CM

## 2025-07-01 DIAGNOSIS — L57.0 ACTINIC KERATOSIS: ICD-10-CM

## 2025-07-01 DIAGNOSIS — R20.2 NOTALGIA PARESTHETICA: ICD-10-CM

## 2025-07-01 RX ORDER — GABAPENTIN 100 MG/1
100 CAPSULE ORAL 2 TIMES DAILY
Qty: 60 CAPSULE | Refills: 11 | Status: SHIPPED | OUTPATIENT
Start: 2025-07-01 | End: 2026-07-01

## 2025-07-01 NOTE — PROGRESS NOTES
[facility]  Canelo Calhoun MD    [address] [place] [address]  [phone]    Fax: [phone]    Patient Name: Verito Weston  Medical Record Number: [id number]  PCP: Maricel Avila MD  Age: 89 y.o. : 12/15/1930  Contact: [phone] (home)     CC: rash   History of Present Illness:     Verito Weston is a 89 y.o.  female with no history of skin cancer who presents to clinic today for scalp pruritus with the absence of rash and a lesion of R cheek and nose.     The patient has no other concerns today.    Review of Systems:     Unremarkable other than mentioned above.     Physical Exam:     General: Relaxed, oriented, alert    Skin examination of the scalp, face, neck, chest, back, abdomen, upper extremities and lower extremities were normal except for as listed below      Assessment and Plan:     Scalp Pruritus   - no primary dermatosis but abundant excoriation     - Discussed there are many causes of pruritus, including multiple sclerosis, myasthenia gravis, medications, thyroid disease, liver or kidney disease, blood dyscrasias, malignancies, neuropathy, diabetes, and xerosis       Gabapentin 100 mg     Counseling   Investigation for degenerative disc disease or nerve entrapment may be necessary.      2. Neoplasm of Uncertain Behavior   - pearly papule located on the R nasal ala  Ddx includes: BCC    - pt elects to monitor given her age     3. Actinic Keratoses  Erythematous, scaly papules on L hand and arm , R arm     Plan:   Liquid Nitrogen.  A total of 3 lesions were treated with liquid nitrogen for 2 freeze-thaw cycles lasting 5 seconds, located on the above locations.   The patient's consent was obtained including but not limited to risks of crusting, scabbing,  blistering, scarring, darker or lighter pigmentary change, recurrence, incomplete removal and infection.    Counseling.  Sun protective clothing and broad spectrum sunscreen can prevent the  formation of AK.   AKs can be treated with cryotherapy, photodynamic therapy, imiquimod, topical 5-FU.  Actinic Keratoses are precancerous proliferations that occur within sun damaged skin. If untreated,  a small subset of AKs can develop into Squamous Cell Carcinoma.      4. High Risk Medication Monitoring : The risks and benefits of the medication were reviewed in full with the patient. Should any side effects occur, the patient will stop the medication and contact me immediately.    Canelo Calhoun MD   Mohs Surgery/Dermatologic Oncology  Dermatology

## 2025-07-24 ENCOUNTER — HOSPITAL ENCOUNTER (OUTPATIENT)
Dept: RADIOLOGY | Facility: HOSPITAL | Age: OVER 89
Discharge: HOME | End: 2025-07-24
Attending: FAMILY MEDICINE
Payer: MEDICARE

## 2025-07-24 DIAGNOSIS — Z12.31 VISIT FOR SCREENING MAMMOGRAM: ICD-10-CM

## 2025-07-24 DIAGNOSIS — N64.4 BREAST PAIN: ICD-10-CM

## 2025-07-24 PROCEDURE — 77063 BREAST TOMOSYNTHESIS BI: CPT | Mod: TC

## (undated) DEVICE — APPLIER LIGACLIP MED 11IN

## (undated) DEVICE — APPLIER LIGACLIP SM 9.38IN

## (undated) DEVICE — SUT GUT CHROMIC 3-0 SH 36IN

## (undated) DEVICE — SUT 3-0 VICRYL / SH (J416)

## (undated) DEVICE — GLOVE 6.5 PROTEXIS PI BLUE

## (undated) DEVICE — GLOVE SENSICARE PI SURG 6.5

## (undated) DEVICE — SUT MONOCYRL 4-0 PS2 UND

## (undated) DEVICE — STRIP MEDI WND CLSR 1X5IN

## (undated) DEVICE — ADHESIVE MASTISOL VIAL 48/BX

## (undated) DEVICE — GLOVE BIOGEL SKINSENSE PI 7.5

## (undated) DEVICE — GOWN NONREINF SET-IN SLV 2XL

## (undated) DEVICE — KIT BASIC RUSH

## (undated) DEVICE — APPLICATOR CHLORAPREP ORN 26ML